# Patient Record
Sex: FEMALE | Race: OTHER | Employment: UNEMPLOYED | URBAN - METROPOLITAN AREA
[De-identification: names, ages, dates, MRNs, and addresses within clinical notes are randomized per-mention and may not be internally consistent; named-entity substitution may affect disease eponyms.]

---

## 2018-09-17 ENCOUNTER — HOSPITAL ENCOUNTER (INPATIENT)
Age: 63
LOS: 5 days | Discharge: HOME | DRG: 445 | End: 2018-09-23
Attending: EMERGENCY MEDICINE | Admitting: HOSPITALIST
Payer: MEDICARE

## 2018-09-17 DIAGNOSIS — R74.8 ELEVATED LIVER ENZYMES: ICD-10-CM

## 2018-09-17 DIAGNOSIS — E83.42 HYPOMAGNESEMIA: Primary | ICD-10-CM

## 2018-09-17 DIAGNOSIS — N30.01 ACUTE CYSTITIS WITH HEMATURIA: ICD-10-CM

## 2018-09-17 DIAGNOSIS — K80.50 CHOLEDOCHOLITHIASIS: ICD-10-CM

## 2018-09-17 DIAGNOSIS — K80.81 BILIARY CALCULUS OF OTHER SITE WITH OBSTRUCTION: ICD-10-CM

## 2018-09-17 LAB
APPEARANCE UR: CLEAR
BACTERIA URNS QL MICRO: ABNORMAL /HPF
BILIRUB UR QL: ABNORMAL
COLOR UR: ABNORMAL
EPITH CASTS URNS QL MICRO: ABNORMAL /LPF (ref 0–5)
GLUCOSE UR STRIP.AUTO-MCNC: NEGATIVE MG/DL
HGB UR QL STRIP: ABNORMAL
KETONES UR QL STRIP.AUTO: ABNORMAL MG/DL
LEUKOCYTE ESTERASE UR QL STRIP.AUTO: ABNORMAL
NITRITE UR QL STRIP.AUTO: POSITIVE
PH UR STRIP: 5 [PH] (ref 5–8)
PROT UR STRIP-MCNC: 100 MG/DL
RBC #/AREA URNS HPF: ABNORMAL /HPF (ref 0–5)
SP GR UR REFRACTOMETRY: 1.02 (ref 1–1.03)
UROBILINOGEN UR QL STRIP.AUTO: 1 EU/DL (ref 0.2–1)
WBC URNS QL MICRO: ABNORMAL /HPF (ref 0–5)

## 2018-09-17 PROCEDURE — 87186 SC STD MICRODIL/AGAR DIL: CPT | Performed by: EMERGENCY MEDICINE

## 2018-09-17 PROCEDURE — 99285 EMERGENCY DEPT VISIT HI MDM: CPT

## 2018-09-17 PROCEDURE — 81001 URINALYSIS AUTO W/SCOPE: CPT | Performed by: EMERGENCY MEDICINE

## 2018-09-17 PROCEDURE — 87086 URINE CULTURE/COLONY COUNT: CPT | Performed by: EMERGENCY MEDICINE

## 2018-09-17 PROCEDURE — 87077 CULTURE AEROBIC IDENTIFY: CPT | Performed by: EMERGENCY MEDICINE

## 2018-09-17 PROCEDURE — 93005 ELECTROCARDIOGRAM TRACING: CPT

## 2018-09-17 RX ORDER — AMLODIPINE BESYLATE 5 MG/1
5 TABLET ORAL DAILY
COMMUNITY
End: 2018-09-23

## 2018-09-17 RX ORDER — ATENOLOL 50 MG/1
50 TABLET ORAL 2 TIMES DAILY
COMMUNITY
End: 2018-09-23

## 2018-09-17 NOTE — IP AVS SNAPSHOT
[address] [address] 
[phone] Patient: Aurora Root MRN: [id number] TXO:[id number]/[id number] About your hospitalization You were admitted on:  September 18, 2018 You last received care in the:  [address] [place] [address] You were discharged on:  September 23, 2018 Why you were hospitalized Your primary diagnosis was:  Choledocholithiasis Your diagnoses also included:  Hypomagnesemia, Uti (Urinary Tract Infection), Hypertension Follow-up Information Follow up With Details Comments Contact Info Mane Galan MD On 11/27/2018 Follow up appointment scheduled for November 27, 2018 at 2:00 p.m. Please bring Montserratian Speaking Family Member with you to follow up appointment. [address] [place] [address] [address] [place] [address] [phone] Flores Law MD On 10/1/2018 Follow up appointment scheduled for October 1, 2018 at 11:30 a.m. Please arrive at 11:00 a.m. for check in. Please bring Montserratian Speaking Family Member with you to the appointment. [address] [facility] [address] [facility] 
[phone] Daina Valencia MD   [address] [facility] [address] [place], [place] [id number] 
[phone] Discharge Orders None A check mary indicates which time of day the medication should be taken. My Medications START taking these medications Instructions Each Dose to Equal  
 Morning Noon Evening Bedtime  
 ciprofloxacin HCl 500 mg tablet Commonly known as:  CIPRO Your last dose was: Your next dose is: Take 1 Tab by mouth every twelve (12) hours. 500 mg  
    
   
   
   
  
 hydrALAZINE 25 mg tablet Commonly known as:  APRESOLINE Your last dose was: Your next dose is: Take 1 Tab by mouth three (3) times daily. 25 mg  
    
   
   
   
  
 lisinopril 20 mg tablet Commonly known as:  Dayana Campbell Your last dose was: Your next dose is: Take 1 Tab by mouth daily. 20 mg CHANGE how you take these medications Instructions Each Dose to Equal  
 Morning Noon Evening Bedtime  
 amLODIPine 10 mg tablet Commonly known as:  Claude Ochoa What changed:   
- medication strength 
- how much to take Your last dose was: Your next dose is: Take 1 Tab by mouth daily. 10 mg  
    
   
   
   
  
 atenolol 100 mg tablet Commonly known as:  TENORMIN What changed:   
- medication strength 
- how much to take Your last dose was: Your next dose is: Take 1 Tab by mouth two (2) times a day. 100 mg Where to Get Your Medications Information on where to get these meds will be given to you by the nurse or doctor. ! Ask your nurse or doctor about these medications  
  amLODIPine 10 mg tablet  
 atenolol 100 mg tablet  
 ciprofloxacin HCl 500 mg tablet  
 hydrALAZINE 25 mg tablet  
 lisinopril 20 mg tablet Discharge Instructions Aprenda sobre cálculos biliares - [ Learning About Gallstones ] 

Elana Morena son los cálculos biliares? Los cálculos biliares son piedras que se carmen en la vesícula biliar. La vesícula biliar es un saco pequeño ubicado romain debajo del hígado. [place] la bilis secretada por el hígado. La bilis lo ayuda a [employer]. Los cálculos biliares pueden ser más pequeños que un grano de arena o tan grandes ana corazon pelota de golf. Los cálculos biliares se carmen cuando el colesterol y otras sustancias que se encuentran en la bilis carmen cálculos. También pueden formarse si la vesícula biliar no se vacía ana debería. Los cálculos también pueden formarse en el colédoco o en el conducto cístico. Estos tubos llevan la bilis de la vesícula biliar y el hígado al intestino pan. Qué pasa cuando tiene cálculos biliares? Los cálculos biliares pueden causar muchos problemas diferentes, ana: 
· Corazon obstrucción en el colédoco. 
· Inflamación o infección de la vesícula biliar (colecistitis aguda). [place] puede pasar cuando un cálculo biliar bloquea el conducto cístico. 
· Inflamación o infección del colédoco (colangitis). [place] puede pasar cuando los cálculos biliares se atascan en el colédoco. En raras ocasiones, esto puede dañar el hígado o diseminar corazon infección. · Pancreatitis, corazon inflamación del páncreas. Cuáles son los síntomas? · La mayoría de las personas que tienen cálculos biliares no tienen síntomas. · Los síntomas pueden incluir: ¨ Dolor leve en la boca del estómago o en la parte superior derecha del abdomen. Puede extenderse a la parte superior derecha de la espalda o al área del omóplato. ¨ Dolor intenso que puede aparecer y desaparecer o ser juma. Puede ser peor al comer. Lamar Linear y escalofríos, si un cálculo biliar está obstruyendo un conducto y está causando corazon infección. ¨ Tinte amarillo en la piel y en la parte marv de los ojos. Cómo se pueden prevenir los cálculos biliares? No hay corazon manera cierta de prevenir los cálculos biliares. Sin embargo, usted puede reducir el riesgo de que se formen cálculos biliares que puedan causar síntomas. · Mantenga un peso saludable. Si necesita bajar de peso, hágalo despacio y en forma sensata. · Coma comidas equilibradas en forma regular. · Yaquelin Bathe y james ejercicio regularmente. Cómo se tratan los cálculos biliares? · Si usted no tiene síntomas, probablemente no necesite tratamiento. · Si tiene síntomas leves, es posible que randle médico le diga que debe sobia un analgésico (medicamento para el dolor) y que espere para edison si desaparece el dolor.  
· Para earnestine intensos o infección, o si tiene más de un ataque de cálculos biliares, randle médico puede sugerirle que le extraigan la vesícula biliar. El cuerpo funciona umair sin la vesícula biliar. La atención de seguimiento es corazon parte clave de randle tratamiento y seguridad. Asegúrese de hacer y acudir a todas las citas, y llame a randle médico si está teniendo problemas. También es corazon buena idea saber los resultados de jasmyn exámenes y mantener corazon lista de los medicamentos que marc. Dónde puede encontrar más información en inglés? Justin Henry a http://reema-balta.info/. Edin Petit Z738 en la búsqueda para aprender más acerca de \"Aprenda sobre cálculos biliares - [ Learning About Gallstones ]. \" 
Revisado: 12 mayo, 2017 Versión del contenido: 11.7 © 5632-6286 Healthwise, Incorporated. Las instrucciones de cuidado fueron adaptadas bajo licencia por [employer] (which disclaims liability or warranty for this information). Si usted tiene [place] afección médica o sobre estas instrucciones, siempre pregunte a randle profesional de al. Healthwise, Incorporated niega toda garantía o responsabilidad por randle uso de esta información. Nearbox Announcement We are excited to announce that we are making your provider's discharge notes available to you in Nearbox. You will see these notes when they are completed and signed by the physician that discharged you from your recent hospital stay. If you have any questions or concerns about any information you see in Nearbox, please call the Health Information Department where you were seen or reach out to your Primary Care Provider for more information about your plan of care. Introducing hospitals & HEALTH [facility]! [facility] introduce portal paciente Nearbox . Ahora se puede acceder a partes de randle expediente médico, enviar por correo electrónico la oficina de randle médico y solicitar renovaciones de medicamentos en línea. En randle navegador de Internet , Cleo Due a [email or web address] Sal clic en el usuario por Reece Bail?  Pankaj Poncer clic aquí en la Hilaria Barbour. Verá la página de registro [place]. Ingrese randle código de [employer] alvino y ana aparece a continuación. Usted no tendrá que [employer] código después de cesar completado el proceso de registro . Si usted no se inscribe antes de la fecha de caducidad , debe solicitar un nuevo código. · MyChart Código de acceso : [id number] Expires: 12/16/2018 11:21 PM 
 
Ingresa los últimos cuatro dígitos de randle Número de Seguro Social ( xxxx ) y fecha de nacimiento ( dd / mm / aaaa ) ana se indica y sal clic en Enviar. Usted será llevado a la siguiente página de registro . Crear un ID MyChart . Esta será randle ID de inicio de sesión de MyChart y no puede ser [place] , por lo que pensar en corazon que es Sharlette Handy y fácil de recordar . Crear corazon contraseña MyChart . Usted puede cambiar randle contraseña en cualquier momento . Ingrese randle Password Reset de preguntas y [place] . [place] se puede utilizar en un momento posterior si usted olvida randle contraseña. Introduzca randle dirección de correo electrónico . Coco Estimable recibirá corazon notificación por correo electrónico cuando la nueva información está disponible en MyChart . Bernard zuniga en Registrarse. Lulú Conn edison y descargar porciones de randle expediente médico. 
Sal clic en el enlace de descarga del menú Resumen para descargar corazon copia portátil de randle información médica . Si tiene Kamran [employer] , por favor visite la sección de preguntas frecuentes del sitio web MyChart . Recuerde, MyChart NO es que se utilizará para las necesidades urgentes. Para emergencias médicas , llame al 911 . Ahora disponible en randle iPhone y Android ! Introducing Ha Forman As a [employer] patient, I wanted to make you aware of our electronic visit tool called Ha Forman. [employer] 24/7 allows you to connect within minutes with a medical provider 24 hours a day, seven days a week via a mobile device or tablet or logging into a secure website from your computer. You can access [employer] from anywhere in the [place]. A virtual visit might be right for you when you have a simple condition and feel like you just dont want to get out of bed, or cant get away from work for an appointment, when your regular [facility] provider is not available (evenings, weekends or holidays), or when youre out of town and need minor care. Electronic visits cost only $49 and if the [facility] 24/7 provider determines a prescription is needed to treat your condition, one can be electronically transmitted to a nearby pharmacy*. Please take a moment to enroll today if you have not already done so. The enrollment process is free and takes just a few minutes. To enroll, please download the [facility] 24/7 jazzmine to your tablet or phone, or visit [email or web address] to enroll on your computer. And, as an [address] [place] [address] patient with a [employer] account, the results of your visits will be scanned into your electronic medical record and your primary care provider will be able to view the scanned results. We urge you to continue to see your regular [facility] provider for your ongoing medical care. And while your primary care provider may not be the one available when you seek a Ha Reederfin virtual visit, the peace of mind you get from getting a real diagnosis real time can be priceless. For more information on Ha [employer]icaltamfin, view our Frequently Asked Questions (FAQs) at [email or web address]. Sincerely, 
 
Feliciano Hayden MD 
Chief Medical Officer Victor M Powell *:  certain medications cannot be prescribed via SoloPower Unresulted Labs-Please follow up with your PCP about these lab tests Order Current Status CULTURE, BLOOD Preliminary result CULTURE, BLOOD Preliminary result EKG, 12 LEAD, INITIAL Preliminary result Providers Seen During Your Hospitalization Provider Specialty Primary office phone Cecilia Harvey DO Emergency Medicine [phone] Enid Hand MD [facility] [phone] Immunizations Administered for This Admission Name Date Influenza Vaccine (Quad) PF 9/23/2018 Your Primary Care Physician (PCP) Primary Care Physician Office Phone Office Fax Mabel Lunsford [phone] You are allergic to the following No active allergies Recent Documentation Height Weight BMI Smoking Status 1.499 m 63.1 kg 28.1 kg/m2 Never Smoker Emergency Contacts Name Discharge Info Relation Home Work Mobile ArmandoRola DISCHARGE CAREGIVER [3] Child [2] [phone] Patient Belongings The following personal items are in your possession at time of discharge: 
  Dental Appliances: None  Visual Aid: None      Home Medications: None   Jewelry: Bracelet (with brandon)  Clothing: None    Other Valuables: None Please provide this summary of care documentation to your next provider. Signatures-by signing, you are acknowledging that this After Visit Summary has been reviewed with you and you have received a copy. Patient Signature:  ____________________________________________________________ Date:  ____________________________________________________________  
  
Aloma Snellen Provider Signature:  ____________________________________________________________ Date:  ____________________________________________________________  
  
  
   
  
[address] 
[address] [place] [address] 
[phone] Patient: Kj Courtney MRN: [id number] EYB:[id number] Sobre huertas hospitalización Le admitieron el:  September 18, 2018 Huertas tratamiento más reciente [facility]  THE [address] SURGICAL/ONCOLOGY Le dieron de edson el:  September 23, 2018 Por qué le ingresaron Huertas diagnosis primaria es:  Choledocholithiasis Huertas diagnosis también incluye:  Hypomagnesemia, Uti (Urinary Tract Infection), Hypertension Follow-up Information Follow up With Details Comments Contact Info Juan Vivar MD On 11/27/2018 Follow up appointment scheduled for November 27, 2018 at 2:00 p.m. Please bring Niuean Speaking Family Member with you to follow up appointment. [address] [facility] [address] [place] [id number] 
[phone] Shauna Serrato MD On 10/1/2018 Follow up appointment scheduled for October 1, 2018 at 11:30 a.m. Please arrive at 11:00 a.m. for check in. Please bring Niuean Speaking Family Member with you to the appointment. [address] [facility] [address] [facility] 
[phone] Arabella Miguel MD   [address] [facility] [address] [place] [address] 
[phone] Discharge Orders Cash'o & Butcher A check mary indicates which time of day the medication should be taken. My Medications EMPIECE a "[employer] Instructions Each Dose to Equal  
 Morning Noon Evening Bedtime  
 ciprofloxacin HCl 500 mg tablet También conocido ana:  CIPRO Your last dose was: Your next dose is: Take 1 Tab by mouth every twelve (12) hours. 500 mg  
    
   
   
   
  
 hydrALAZINE 25 mg tablet También conocido ana:  APRESOLINE Your last dose was: Your next dose is: Take 1 Tab by mouth three (3) times daily. 25 mg  
    
   
   
   
  
 lisinopril 20 mg tablet También conocido ana:  Harlingen Escort Your last dose was: Your next dose is: Take 1 Tab by mouth daily. 20 mg  
    
   
   
   
  
  
CAMBIE la forma de "[employer] Instructions Each Dose to Equal  
 Morning Noon Evening Bedtime  
 amLODIPine 10 mg tablet También conocido ana:  Casper Cater Lo que cambió:   
- concentración del medicamento 
- dosis a sobia Your last dose was: Your next dose is: Take 1 Tab by mouth daily. 10 mg  
    
   
   
   
  
 atenolol 100 mg tablet También conocido ana:  TENORMIN Lo que cambió:   
- concentración del medicamento 
- dosis a sobia Your last dose was: Your next dose is: Take 1 Tab by mouth two (2) times a day. 100 mg  
    
   
   
   
  
  
  
Dónde puede recoger jasmyn medicamentos Information on where to get these meds will be given to you by the nurse or doctor. ! Pregunte a randle médico o enfermero/a sobre estos medicamentos  
  amLODIPine 10 mg tablet  
 atenolol 100 mg tablet  
 ciprofloxacin HCl 500 mg tablet  
 hydrALAZINE 25 mg tablet  
 lisinopril 20 mg tablet Instrucciones a ed de edson Aprenda sobre cálculos biliares - [ Learning About Gallstones ] 

Gurvinder Barakat son los cálculos biliares? Los cálculos biliares son piedras que se carmen en la vesícula biliar. La vesícula biliar es un saco pequeño ubicado romain debajo del hígado. [place] la bilis secretada por el hígado. La bilis lo ayuda a [employer]. Los cálculos biliares pueden ser más pequeños que un grano de arena o tan grandes ana corazon pelota de golf. Los cálculos biliares se carmen cuando el colesterol y otras sustancias que se encuentran en la bilis carmen cálculos. También pueden formarse si la vesícula biliar no se vacía ana debería. Los cálculos también pueden formarse en el colédoco o en el conducto cístico. Estos tubos llevan la bilis de la vesícula biliar y el hígado al intestino pan. Qué pasa cuando tiene cálculos biliares? Los cálculos biliares pueden causar muchos problemas diferentes, ana: 
· Corazon obstrucción en el colédoco. 
· Inflamación o infección de la vesícula biliar (colecistitis aguda).  [place] puede pasar cuando un cálculo biliar bloquea el conducto cístico. 
· Inflamación o infección del colédoco (colangitis). [place] puede pasar cuando los cálculos biliares se atascan en el colédoco. En raras ocasiones, esto puede dañar el hígado o diseminar corazon infección. · Pancreatitis, corazon inflamación del páncreas. Cuáles son los síntomas? · La mayoría de las personas que tienen cálculos biliares no tienen síntomas. · Los síntomas pueden incluir: ¨ Dolor leve en la boca del estómago o en la parte superior derecha del abdomen. Puede extenderse a la parte superior derecha de la espalda o al área del omóplato. ¨ Dolor intenso que puede aparecer y desaparecer o ser juma. Puede ser peor al comer. Malva File y escalofríos, si un cálculo biliar está obstruyendo un conducto y está causando corazon infección. ¨ Tinte amarillo en la piel y en la parte marv de los ojos. Cómo se pueden prevenir los cálculos biliares? No hay corazon manera cierta de prevenir los cálculos biliares. Sin embargo, usted puede reducir el riesgo de que se formen cálculos biliares que puedan causar síntomas. · Mantenga un peso saludable. Si necesita bajar de peso, hágalo despacio y en forma sensata. · Coma comidas equilibradas en forma regular. · Lizandro Men y james ejercicio regularmente. Cómo se tratan los cálculos biliares? · Si usted no tiene síntomas, probablemente no necesite tratamiento. · Si tiene síntomas leves, es posible que randle médico le diga que debe sobia un analgésico (medicamento para el dolor) y que espere para edison si desaparece el dolor. · Para earnestine intensos o infección, o si tiene más de un ataque de cálculos biliares, randle médico puede sugerirle que le extraigan la vesícula biliar. El cuerpo funciona umair sin la vesícula biliar. La atención de seguimiento es corazon parte clave de randle tratamiento y seguridad.  Asegúrese de hacer y acudir a todas las citas, y llame a randle médico si está teniendo problemas. También es corazon buena idea saber los resultados de jasmyn exámenes y mantener corazon lista de los medicamentos que marc. Dónde puede encontrar más información en inglés? Vince Montenegro a http://reema-balta.info/. Glorya Sacks J483 en la búsqueda para aprender más acerca de \"Aprenda sobre cálculos biliares - [ Learning About Gallstones ]. \" 
Revisado: 12 [place], 2017 Versión del contenido: 11.7 © 6739-9674 Healthwise, Incorporated. Las instrucciones de cuidado fueron adaptadas bajo licencia por Good [employer] Conne[employer] (which disclaims liability or warranty for this information). Si usted tiene [place] afección médica o sobre estas instrucciones, siempre pregunte a randle profesional de al. Healthwise, Incorporated niega toda garantía o responsabilidad por randle uso de esta información. [employer] Announcement We are excited to announce that we are making your provider's discharge notes available to you in [employer]. You will see these notes when they are completed and signed by the physician that discharged you from your recent hospital stay. If you have any questions or concerns about any information you see in [employer], please call the Health Information Department where you were seen or reach out to your Primary Care Provider for more information about your plan of care. Introducing [facility] & HEALTH SERVICES! [facility] introduce portal paciente [employer] . Ahora se puede acceder a partes de randle expediente médico, enviar por correo electrónico la oficina de randle médico y solicitar renovaciones de medicamentos en línea. En randle navegador de Internet , Mag mcgee [email or web address] Sal clic en el usuario por On license of [facility]? Alpa Cui clic aquí en la sesión Allen Roche. Verá la página de registro [place]. Ingrese randle código de [facility] alvino y ana aparece a continuación.  Usted no tendrá que [employer] código después de cesar completado el proceso de registro . Si usted no se inscribe antes de la fecha de caducidad , debe solicitar un nuevo código. · MyChart Código de acceso : [id number] Expires: 12/16/2018 11:21 PM 
 
Ingresa los últimos cuatro dígitos de randle Número de Seguro Social ( xxxx ) y fecha de nacimiento ( dd / mm / aaaa ) ana se indica y sal clic en Enviar. Usted será llevado a la siguiente página de registro . Crear un ID MyChart . Esta será randle ID de inicio de sesión de MyChart y no puede ser [place] , por lo que pensar en corazon que es Gray Saver y fácil de recordar . Crear corazon contraseña MyChart . Usted puede cambiar randle contraseña en cualquier momento . Ingrese randle Password Reset de preguntas y [place] . [place] se puede utilizar en un momento posterior si usted olvida randle contraseña. Introduzca randle dirección de correo electrónico . Kaylee Wei recibirá corazon notificación por correo electrónico cuando la nueva información está disponible en MyChart . Basil zuniga en Registrarse. Yesi Moralezter edison y descargar porciones de randle expediente médico. 
Sal nadia en el enlace de descarga del menú Resumen para descargar corazon copia portátil de randle información médica . Si tiene Kamran [employer] , por favor visite la sección de preguntas frecuentes del sitio web MyChart . Recuerde, MyChart NO es que se utilizará para las necesidades urgentes. Para emergencias médicas , llame al 911 . Ahora disponible en randle iPhone y Android ! Introducing Ha Forman As a [employer] patient, I wanted to make you aware of our electronic visit tool called Ha Cotatamlorena. [employer] 24/7 allows you to connect within minutes with a medical provider 24 hours a day, seven days a week via a mobile device or tablet or logging into a secure website from your computer. You can access Ha Forman from anywhere in the [place].  
 
A virtual visit might be right for you when you have a simple condition and feel like you just dont want to get out of bed, or cant get away from work for an appointment, when your regular [employer] provider is not available (evenings, weekends or holidays), or when youre out of town and need minor care. Electronic visits cost only $49 and if the [employer] 24/7 provider determines a prescription is needed to treat your condition, one can be electronically transmitted to a nearby pharmacy*. Please take a moment to enroll today if you have not already done so. The enrollment process is free and takes just a few minutes. To enroll, please download the [employer] 24/7 jazzmine to your tablet or phone, or visit [email or web address] to enroll on your computer. And, as an [address] [place] [address] patient with a [employer] account, the results of your visits will be scanned into your electronic medical record and your primary care provider will be able to view the scanned results. We urge you to continue to see your regular [employer] provider for your ongoing medical care. And while your primary care provider may not be the one available when you seek a xzoops virtual visit, the peace of mind you get from getting a real diagnosis real time can be priceless. For more information on xzoops, view our Frequently Asked Questions (FAQs) at [email or web address] org. Sincerely, 
 
Sarahi Huston MD 
Chief Medical Officer Victor M Powell *:  certain medications cannot be prescribed via xzoops Unresulted Labs-Please follow up with your PCP about these lab tests Order Current Status CULTURE, BLOOD Preliminary result CULTURE, BLOOD Preliminary result EKG, 12 LEAD, INITIAL Preliminary result Providers Seen During Your Hospitalization Personal [facility] Teléfono principal de la oficina Edgar Minium, DO Emergency Medicine [phone] Stephanie Ford MD [facility] [phone] Alma Delia Ramey Administradas en esta admisión:    
   
 Kirsten Flores Influenza Vaccine (Quad) PF 9/23/2018 [facility] prim[facility] (PCP ) Primary Care Physician Office Phone Office Fax Suresh Cueva [phone] Tiene alergias a lo siguiente No tiene alergias Documentación recientes Height Weight BMI (IMC) Estatus de tabaquísmo 1.499 m 63.1 kg 28.1 kg/m2 Never Smoker Emergency Contacts Name Discharge Info Relation Home Work Mobile RobertsRola DISCHARGE CAREGIVER [3] Child [2] [phone] Patient Belongings The following personal items are in your possession at time of discharge: 
  Dental Appliances: None  Visual Aid: None      Home Medications: None   Jewelry: Bracelet (with brandon)  Clothing: None    Other Valuables: None Please provide this summary of care documentation to your next provider. Signatures-by signing, you are acknowledging that this After Visit Summary has been reviewed with you and you have received a copy. Patient Signature:  ____________________________________________________________ Date:  ____________________________________________________________  
  
Sunita Engle Provider Signature:  ____________________________________________________________ Date:  ____________________________________________________________

## 2018-09-17 NOTE — IP AVS SNAPSHOT
[address] [address] 
[phone] Patient: Rafia Schreiber MRN: [id number] NVN:[id number] A check mary indicates which time of day the medication should be taken. My Medications START taking these medications Instructions Each Dose to Equal  
 Morning Noon Evening Bedtime  
 ciprofloxacin HCl 500 mg tablet Commonly known as:  CIPRO Your last dose was: Your next dose is: Take 1 Tab by mouth every twelve (12) hours. 500 mg  
    
   
   
   
  
 hydrALAZINE 25 mg tablet Commonly known as:  APRESOLINE Your last dose was: Your next dose is: Take 1 Tab by mouth three (3) times daily. 25 mg  
    
   
   
   
  
 lisinopril 20 mg tablet Commonly known as:  Elana Loss Your last dose was: Your next dose is: Take 1 Tab by mouth daily. 20 mg CHANGE how you take these medications Instructions Each Dose to Equal  
 Morning Noon Evening Bedtime  
 amLODIPine 10 mg tablet Commonly known as:  Don Stevens What changed:   
- medication strength 
- how much to take Your last dose was: Your next dose is: Take 1 Tab by mouth daily. 10 mg  
    
   
   
   
  
 atenolol 100 mg tablet Commonly known as:  TENORMIN What changed:   
- medication strength 
- how much to take Your last dose was: Your next dose is: Take 1 Tab by mouth two (2) times a day. 100 mg Where to Get Your Medications Information on where to get these meds will be given to you by the nurse or doctor. ! Ask your nurse or doctor about these medications  
  amLODIPine 10 mg tablet  
 atenolol 100 mg tablet  
 ciprofloxacin HCl 500 mg tablet  
 hydrALAZINE 25 mg tablet  
 lisinopril 20 mg tablet

## 2018-09-18 ENCOUNTER — APPOINTMENT (OUTPATIENT)
Dept: ULTRASOUND IMAGING | Age: 63
DRG: 445 | End: 2018-09-18
Attending: EMERGENCY MEDICINE
Payer: MEDICARE

## 2018-09-18 ENCOUNTER — ANESTHESIA EVENT (OUTPATIENT)
Dept: ENDOSCOPY | Age: 63
DRG: 445 | End: 2018-09-18
Payer: MEDICARE

## 2018-09-18 PROBLEM — N39.0 UTI (URINARY TRACT INFECTION): Status: ACTIVE | Noted: 2018-09-18

## 2018-09-18 PROBLEM — K80.50 CHOLEDOCHOLITHIASIS: Status: ACTIVE | Noted: 2018-09-18

## 2018-09-18 PROBLEM — E83.42 HYPOMAGNESEMIA: Status: ACTIVE | Noted: 2018-09-18

## 2018-09-18 LAB
ALBUMIN SERPL-MCNC: 4 G/DL (ref 3.4–5)
ALBUMIN/GLOB SERPL: 0.9 {RATIO} (ref 0.8–1.7)
ALP SERPL-CCNC: 271 U/L (ref 45–117)
ALT SERPL-CCNC: 406 U/L (ref 13–56)
ANION GAP SERPL CALC-SCNC: 16 MMOL/L (ref 3–18)
AST SERPL-CCNC: 259 U/L (ref 15–37)
BASOPHILS # BLD: 0 K/UL (ref 0–0.1)
BASOPHILS NFR BLD: 0 % (ref 0–2)
BILIRUB SERPL-MCNC: 5.9 MG/DL (ref 0.2–1)
BUN SERPL-MCNC: 20 MG/DL (ref 7–18)
BUN/CREAT SERPL: 17 (ref 12–20)
CALCIUM SERPL-MCNC: 9.5 MG/DL (ref 8.5–10.1)
CHLORIDE SERPL-SCNC: 100 MMOL/L (ref 100–108)
CO2 SERPL-SCNC: 24 MMOL/L (ref 21–32)
CREAT SERPL-MCNC: 1.2 MG/DL (ref 0.6–1.3)
DIFFERENTIAL METHOD BLD: ABNORMAL
EOSINOPHIL # BLD: 0 K/UL (ref 0–0.4)
EOSINOPHIL NFR BLD: 0 % (ref 0–5)
ERYTHROCYTE [DISTWIDTH] IN BLOOD BY AUTOMATED COUNT: 14.1 % (ref 11.6–14.5)
GLOBULIN SER CALC-MCNC: 4.5 G/DL (ref 2–4)
GLUCOSE BLD STRIP.AUTO-MCNC: 105 MG/DL (ref 70–110)
GLUCOSE SERPL-MCNC: 203 MG/DL (ref 74–99)
HCT VFR BLD AUTO: 38.7 % (ref 35–45)
HGB BLD-MCNC: 13.3 G/DL (ref 12–16)
LACTATE SERPL-SCNC: 2 MMOL/L (ref 0.4–2)
LIPASE SERPL-CCNC: 163 U/L (ref 73–393)
LYMPHOCYTES # BLD: 0.5 K/UL (ref 0.9–3.6)
LYMPHOCYTES NFR BLD: 3 % (ref 21–52)
MAGNESIUM SERPL-MCNC: 1.3 MG/DL (ref 1.6–2.6)
MAGNESIUM SERPL-MCNC: 3.1 MG/DL (ref 1.6–2.6)
MCH RBC QN AUTO: 26.7 PG (ref 24–34)
MCHC RBC AUTO-ENTMCNC: 34.4 G/DL (ref 31–37)
MCV RBC AUTO: 77.6 FL (ref 74–97)
MONOCYTES # BLD: 0.3 K/UL (ref 0.05–1.2)
MONOCYTES NFR BLD: 2 % (ref 3–10)
NEUTS SEG # BLD: 16.6 K/UL (ref 1.8–8)
NEUTS SEG NFR BLD: 95 % (ref 40–73)
PLATELET # BLD AUTO: 323 K/UL (ref 135–420)
PMV BLD AUTO: 9.6 FL (ref 9.2–11.8)
POTASSIUM SERPL-SCNC: 3.4 MMOL/L (ref 3.5–5.5)
PROT SERPL-MCNC: 8.5 G/DL (ref 6.4–8.2)
RBC # BLD AUTO: 4.99 M/UL (ref 4.2–5.3)
SODIUM SERPL-SCNC: 140 MMOL/L (ref 136–145)
WBC # BLD AUTO: 17.4 K/UL (ref 4.6–13.2)

## 2018-09-18 PROCEDURE — 74011000250 HC RX REV CODE- 250: Performed by: HOSPITALIST

## 2018-09-18 PROCEDURE — 76705 ECHO EXAM OF ABDOMEN: CPT

## 2018-09-18 PROCEDURE — 96375 TX/PRO/DX INJ NEW DRUG ADDON: CPT

## 2018-09-18 PROCEDURE — 80053 COMPREHEN METABOLIC PANEL: CPT | Performed by: EMERGENCY MEDICINE

## 2018-09-18 PROCEDURE — 87077 CULTURE AEROBIC IDENTIFY: CPT | Performed by: EMERGENCY MEDICINE

## 2018-09-18 PROCEDURE — 96361 HYDRATE IV INFUSION ADD-ON: CPT

## 2018-09-18 PROCEDURE — 74011250636 HC RX REV CODE- 250/636: Performed by: INTERNAL MEDICINE

## 2018-09-18 PROCEDURE — 87040 BLOOD CULTURE FOR BACTERIA: CPT | Performed by: EMERGENCY MEDICINE

## 2018-09-18 PROCEDURE — 74011250636 HC RX REV CODE- 250/636: Performed by: EMERGENCY MEDICINE

## 2018-09-18 PROCEDURE — 85025 COMPLETE CBC W/AUTO DIFF WBC: CPT | Performed by: EMERGENCY MEDICINE

## 2018-09-18 PROCEDURE — 83735 ASSAY OF MAGNESIUM: CPT | Performed by: HOSPITALIST

## 2018-09-18 PROCEDURE — 83690 ASSAY OF LIPASE: CPT | Performed by: EMERGENCY MEDICINE

## 2018-09-18 PROCEDURE — 74011250636 HC RX REV CODE- 250/636: Performed by: HOSPITALIST

## 2018-09-18 PROCEDURE — 83735 ASSAY OF MAGNESIUM: CPT | Performed by: EMERGENCY MEDICINE

## 2018-09-18 PROCEDURE — 65270000029 HC RM PRIVATE

## 2018-09-18 PROCEDURE — 96365 THER/PROPH/DIAG IV INF INIT: CPT

## 2018-09-18 PROCEDURE — 82962 GLUCOSE BLOOD TEST: CPT

## 2018-09-18 PROCEDURE — 87186 SC STD MICRODIL/AGAR DIL: CPT | Performed by: EMERGENCY MEDICINE

## 2018-09-18 PROCEDURE — 74011250637 HC RX REV CODE- 250/637: Performed by: HOSPITALIST

## 2018-09-18 PROCEDURE — 74011000250 HC RX REV CODE- 250: Performed by: EMERGENCY MEDICINE

## 2018-09-18 PROCEDURE — 74011000250 HC RX REV CODE- 250

## 2018-09-18 PROCEDURE — 36415 COLL VENOUS BLD VENIPUNCTURE: CPT | Performed by: HOSPITALIST

## 2018-09-18 PROCEDURE — C9113 INJ PANTOPRAZOLE SODIUM, VIA: HCPCS | Performed by: HOSPITALIST

## 2018-09-18 PROCEDURE — 83605 ASSAY OF LACTIC ACID: CPT | Performed by: EMERGENCY MEDICINE

## 2018-09-18 RX ORDER — DEXTROMETHORPHAN/PSEUDOEPHED 2.5-7.5/.8
1.2 DROPS ORAL
Status: CANCELLED | OUTPATIENT
Start: 2018-09-18

## 2018-09-18 RX ORDER — EPINEPHRINE 0.1 MG/ML
1 INJECTION INTRACARDIAC; INTRAVENOUS
Status: CANCELLED | OUTPATIENT
Start: 2018-09-18 | End: 2018-09-19

## 2018-09-18 RX ORDER — ATENOLOL 50 MG/1
50 TABLET ORAL 2 TIMES DAILY
Status: DISCONTINUED | OUTPATIENT
Start: 2018-09-18 | End: 2018-09-21

## 2018-09-18 RX ORDER — ATROPINE SULFATE 0.1 MG/ML
0.5 INJECTION INTRAVENOUS
Status: CANCELLED | OUTPATIENT
Start: 2018-09-18 | End: 2018-09-19

## 2018-09-18 RX ORDER — WATER FOR INJECTION,STERILE
VIAL (ML) INJECTION
Status: COMPLETED
Start: 2018-09-18 | End: 2018-09-18

## 2018-09-18 RX ORDER — ONDANSETRON 2 MG/ML
4 INJECTION INTRAMUSCULAR; INTRAVENOUS
Status: COMPLETED | OUTPATIENT
Start: 2018-09-18 | End: 2018-09-18

## 2018-09-18 RX ORDER — MORPHINE SULFATE 2 MG/ML
2 INJECTION, SOLUTION INTRAMUSCULAR; INTRAVENOUS
Status: DISCONTINUED | OUTPATIENT
Start: 2018-09-18 | End: 2018-09-23 | Stop reason: HOSPADM

## 2018-09-18 RX ORDER — MAGNESIUM SULFATE HEPTAHYDRATE 40 MG/ML
2 INJECTION, SOLUTION INTRAVENOUS ONCE
Status: COMPLETED | OUTPATIENT
Start: 2018-09-18 | End: 2018-09-18

## 2018-09-18 RX ORDER — NALOXONE HYDROCHLORIDE 0.4 MG/ML
0.4 INJECTION, SOLUTION INTRAMUSCULAR; INTRAVENOUS; SUBCUTANEOUS
Status: CANCELLED | OUTPATIENT
Start: 2018-09-18 | End: 2018-09-18

## 2018-09-18 RX ORDER — SODIUM CHLORIDE 0.9 % (FLUSH) 0.9 %
5-10 SYRINGE (ML) INJECTION AS NEEDED
Status: DISCONTINUED | OUTPATIENT
Start: 2018-09-18 | End: 2018-09-23 | Stop reason: HOSPADM

## 2018-09-18 RX ORDER — KETOROLAC TROMETHAMINE 15 MG/ML
15 INJECTION, SOLUTION INTRAMUSCULAR; INTRAVENOUS
Status: COMPLETED | OUTPATIENT
Start: 2018-09-18 | End: 2018-09-18

## 2018-09-18 RX ORDER — SODIUM CHLORIDE 9 MG/ML
100 INJECTION, SOLUTION INTRAVENOUS CONTINUOUS
Status: CANCELLED | OUTPATIENT
Start: 2018-09-18 | End: 2018-09-18

## 2018-09-18 RX ORDER — AMLODIPINE BESYLATE 5 MG/1
5 TABLET ORAL DAILY
Status: DISCONTINUED | OUTPATIENT
Start: 2018-09-18 | End: 2018-09-20

## 2018-09-18 RX ORDER — SODIUM CHLORIDE 9 MG/ML
75 INJECTION, SOLUTION INTRAVENOUS CONTINUOUS
Status: DISCONTINUED | OUTPATIENT
Start: 2018-09-18 | End: 2018-09-22

## 2018-09-18 RX ORDER — SODIUM CHLORIDE 0.9 % (FLUSH) 0.9 %
5-10 SYRINGE (ML) INJECTION EVERY 8 HOURS
Status: DISCONTINUED | OUTPATIENT
Start: 2018-09-18 | End: 2018-09-23 | Stop reason: HOSPADM

## 2018-09-18 RX ORDER — MIDAZOLAM HYDROCHLORIDE 1 MG/ML
.5-5 INJECTION, SOLUTION INTRAMUSCULAR; INTRAVENOUS
Status: CANCELLED | OUTPATIENT
Start: 2018-09-18 | End: 2018-09-18

## 2018-09-18 RX ORDER — MAGNESIUM SULFATE 1 G/100ML
1 INJECTION INTRAVENOUS
Status: COMPLETED | OUTPATIENT
Start: 2018-09-18 | End: 2018-09-18

## 2018-09-18 RX ORDER — FLUMAZENIL 0.1 MG/ML
0.2 INJECTION INTRAVENOUS
Status: CANCELLED | OUTPATIENT
Start: 2018-09-18 | End: 2018-09-18

## 2018-09-18 RX ORDER — HEPARIN SODIUM 5000 [USP'U]/ML
5000 INJECTION, SOLUTION INTRAVENOUS; SUBCUTANEOUS EVERY 8 HOURS
Status: DISCONTINUED | OUTPATIENT
Start: 2018-09-18 | End: 2018-09-23 | Stop reason: HOSPADM

## 2018-09-18 RX ORDER — FENTANYL CITRATE 50 UG/ML
100 INJECTION, SOLUTION INTRAMUSCULAR; INTRAVENOUS
Status: CANCELLED | OUTPATIENT
Start: 2018-09-18 | End: 2018-09-18

## 2018-09-18 RX ORDER — CIPROFLOXACIN 2 MG/ML
400 INJECTION, SOLUTION INTRAVENOUS EVERY 12 HOURS
Status: DISCONTINUED | OUTPATIENT
Start: 2018-09-18 | End: 2018-09-21

## 2018-09-18 RX ADMIN — SODIUM CHLORIDE 125 ML/HR: 900 INJECTION, SOLUTION INTRAVENOUS at 21:18

## 2018-09-18 RX ADMIN — WATER: 1 INJECTION INTRAMUSCULAR; INTRAVENOUS; SUBCUTANEOUS at 12:40

## 2018-09-18 RX ADMIN — KETOROLAC TROMETHAMINE 15 MG: 15 INJECTION, SOLUTION INTRAMUSCULAR; INTRAVENOUS at 00:19

## 2018-09-18 RX ADMIN — MAGNESIUM SULFATE HEPTAHYDRATE 1 G: 1 INJECTION, SOLUTION INTRAVENOUS at 01:28

## 2018-09-18 RX ADMIN — CIPROFLOXACIN 400 MG: 2 INJECTION, SOLUTION INTRAVENOUS at 17:09

## 2018-09-18 RX ADMIN — HEPARIN SODIUM 5000 UNITS: 5000 INJECTION INTRAVENOUS; SUBCUTANEOUS at 08:00

## 2018-09-18 RX ADMIN — ONDANSETRON 4 MG: 2 INJECTION INTRAMUSCULAR; INTRAVENOUS at 00:18

## 2018-09-18 RX ADMIN — SODIUM CHLORIDE 125 ML/HR: 900 INJECTION, SOLUTION INTRAVENOUS at 07:10

## 2018-09-18 RX ADMIN — HEPARIN SODIUM 5000 UNITS: 5000 INJECTION INTRAVENOUS; SUBCUTANEOUS at 17:09

## 2018-09-18 RX ADMIN — SODIUM CHLORIDE 40 MG: 9 INJECTION INTRAMUSCULAR; INTRAVENOUS; SUBCUTANEOUS at 09:11

## 2018-09-18 RX ADMIN — ATENOLOL 50 MG: 50 TABLET ORAL at 20:32

## 2018-09-18 RX ADMIN — MAGNESIUM SULFATE HEPTAHYDRATE 2 G: 40 INJECTION, SOLUTION INTRAVENOUS at 07:10

## 2018-09-18 RX ADMIN — Medication 10 ML: at 07:11

## 2018-09-18 RX ADMIN — CEFTRIAXONE 1 G: 1 INJECTION, POWDER, FOR SOLUTION INTRAMUSCULAR; INTRAVENOUS at 00:21

## 2018-09-18 RX ADMIN — SODIUM CHLORIDE 1000 ML: 900 INJECTION, SOLUTION INTRAVENOUS at 00:16

## 2018-09-18 RX ADMIN — WATER 2 G: 1 INJECTION INTRAMUSCULAR; INTRAVENOUS; SUBCUTANEOUS at 12:39

## 2018-09-18 NOTE — ED PROVIDER NOTES
EMERGENCY DEPARTMENT HISTORY AND PHYSICAL EXAM 
 
Date: 9/17/2018 Patient Name: Zenobia Rojo History of Presenting Illness Chief Complaint Patient presents with  Vomiting  Abdominal Pain  Back Pain History Provided By: Patient and Patient's Daughter Chief Complaint: Abdominal Pain Duration: 1 Days Timing:  Acute Location: Central 
Quality: Cramping Severity: 5 out of 10 Modifying Factors: No modifying factors Associated Symptoms: vomiting, back pain Additional History (Context):  
11:48 PM 
Zenobia Rojo is a 70 y.o. female with PMHX of HTN, who presents to the emergency department C/O abdominal pain onset 1 day ago. Associated sxs include vomiting, back pain. Pt states that her abdominal pain worsens when vomiting. Pt denies dysuria, diarrhea, hematuria, hx of kidney stones, CP, SOB, tobacco use, EtOH use, illicit drug use, and any other sxs or complaints. PCP: None Current Outpatient Prescriptions Medication Sig Dispense Refill  atenolol (TENORMIN) 50 mg tablet Take 50 mg by mouth two (2) times a day.  amLODIPine (NORVASC) 5 mg tablet Take 5 mg by mouth daily. Past History Past Medical History: 
Past Medical History:  
Diagnosis Date  Hypertension Past Surgical History: 
History reviewed. No pertinent surgical history. Family History: 
History reviewed. No pertinent family history. Social History: 
Social History Substance Use Topics  Smoking status: Never Smoker  Smokeless tobacco: Never Used  Alcohol use No  
 
 
Allergies: 
No Known Allergies Review of Systems Review of Systems Constitutional: Negative for chills. Respiratory: Negative for shortness of breath. Cardiovascular: Negative for chest pain. Gastrointestinal: Positive for abdominal pain and vomiting. Negative for diarrhea. Genitourinary: Negative for dysuria and hematuria. Musculoskeletal: Positive for back pain. All other systems reviewed and are negative. Physical Exam  
 
Vitals:  
 09/18/18 0100 09/18/18 0128 09/18/18 0205 09/18/18 0230 BP: 175/65 160/77 109/76 141/58 Pulse:  85  88 Resp:   18 18 Temp:      
SpO2: 97%  98% 98% Weight:      
Height:      
 
Physical Exam 
Constitutional: Middle aged  female, no acute distress Head: Normocephalic, Atraumatic Eyes: Pupils are equal, round, and reactive to light, EOMI, no scleral icterus, no conjunctival pallor ENT: moist mucous membranes, hearing intact Neck: Supple, non-tender Cardiovascular: Regular rate and rhythm, no murmurs, rubs, or gallops Chest: Normal work of breathing and chest excursion bilaterally Lungs: Clear to ausculation bilaterally, no wheezes, no rhonchi Abdomen: Soft, non tender, non distended, normoactive bowel sounds Back: No evidence of trauma or deformity Extremities: No evidence of trauma or deformity, no LE edema Skin: Warm and dry, normal cap refill Neuro: Alert and appropriate, CN intact, normal speech, moving all 4 extremities freely and symmetrically Psychiatric: Cooperative, appropriate mood Diagnostic Study Results Labs - Recent Results (from the past 12 hour(s)) URINALYSIS W/ RFLX MICROSCOPIC Collection Time: 09/17/18 11:15 PM  
Result Value Ref Range Color DARK YELLOW Appearance CLEAR Specific gravity 1.025 1.005 - 1.030    
 pH (UA) 5.0 5.0 - 8.0 Protein 100 (A) NEG mg/dL Glucose NEGATIVE  NEG mg/dL Ketone TRACE (A) NEG mg/dL Bilirubin LARGE (A) NEG Blood LARGE (A) NEG Urobilinogen 1.0 0.2 - 1.0 EU/dL Nitrites POSITIVE (A) NEG Leukocyte Esterase SMALL (A) NEG URINE MICROSCOPIC ONLY Collection Time: 09/17/18 11:15 PM  
Result Value Ref Range WBC 1 to 3 0 - 5 /hpf  
 RBC 10 to 15 0 - 5 /hpf Epithelial cells 1+ 0 - 5 /lpf Bacteria 3+ (A) NEG /hpf  
EKG, 12 LEAD, INITIAL  Collection Time: 09/17/18 11:32 PM  
 Result Value Ref Range Ventricular Rate 85 BPM  
 Atrial Rate 85 BPM  
 P-R Interval 140 ms QRS Duration 90 ms Q-T Interval 370 ms QTC Calculation (Bezet) 440 ms Calculated P Axis 68 degrees Calculated R Axis 69 degrees Calculated T Axis 42 degrees Diagnosis Normal sinus rhythm Nonspecific ST abnormality Abnormal ECG No previous ECGs available CBC WITH AUTOMATED DIFF Collection Time: 09/18/18 12:00 AM  
Result Value Ref Range WBC 17.4 (H) 4.6 - 13.2 K/uL  
 RBC 4.99 4.20 - 5.30 M/uL  
 HGB 13.3 12.0 - 16.0 g/dL HCT 38.7 35.0 - 45.0 % MCV 77.6 74.0 - 97.0 FL  
 MCH 26.7 24.0 - 34.0 PG  
 MCHC 34.4 31.0 - 37.0 g/dL  
 RDW 14.1 11.6 - 14.5 % PLATELET 241 657 - 758 K/uL MPV 9.6 9.2 - 11.8 FL  
 NEUTROPHILS 95 (H) 40 - 73 % LYMPHOCYTES 3 (L) 21 - 52 % MONOCYTES 2 (L) 3 - 10 % EOSINOPHILS 0 0 - 5 % BASOPHILS 0 0 - 2 %  
 ABS. NEUTROPHILS 16.6 (H) 1.8 - 8.0 K/UL  
 ABS. LYMPHOCYTES 0.5 (L) 0.9 - 3.6 K/UL  
 ABS. MONOCYTES 0.3 0.05 - 1.2 K/UL  
 ABS. EOSINOPHILS 0.0 0.0 - 0.4 K/UL  
 ABS. BASOPHILS 0.0 0.0 - 0.1 K/UL  
 DF AUTOMATED METABOLIC PANEL, COMPREHENSIVE Collection Time: 09/18/18 12:00 AM  
Result Value Ref Range Sodium 140 136 - 145 mmol/L Potassium 3.4 (L) 3.5 - 5.5 mmol/L Chloride 100 100 - 108 mmol/L  
 CO2 24 21 - 32 mmol/L Anion gap 16 3.0 - 18 mmol/L Glucose 203 (H) 74 - 99 mg/dL BUN 20 (H) 7.0 - 18 MG/DL Creatinine 1.20 0.6 - 1.3 MG/DL  
 BUN/Creatinine ratio 17 12 - 20 GFR est AA 55 (L) >60 ml/min/1.73m2 GFR est non-AA 45 (L) >60 ml/min/1.73m2 Calcium 9.5 8.5 - 10.1 MG/DL Bilirubin, total 5.9 (H) 0.2 - 1.0 MG/DL  
 ALT (SGPT) 406 (H) 13 - 56 U/L  
 AST (SGOT) 259 (H) 15 - 37 U/L Alk. phosphatase 271 (H) 45 - 117 U/L Protein, total 8.5 (H) 6.4 - 8.2 g/dL Albumin 4.0 3.4 - 5.0 g/dL Globulin 4.5 (H) 2.0 - 4.0 g/dL A-G Ratio 0.9 0.8 - 1.7 MAGNESIUM  
 Collection Time: 09/18/18 12:00 AM  
Result Value Ref Range Magnesium 1.3 (L) 1.6 - 2.6 mg/dL LIPASE Collection Time: 09/18/18 12:00 AM  
Result Value Ref Range Lipase 163 73 - 393 U/L  
LACTIC ACID Collection Time: 09/18/18 12:00 AM  
Result Value Ref Range Lactic acid 2.0 0.4 - 2.0 MMOL/L Radiologic Studies -  
US ABD LTD Final Result IMPRESSION: 
 
Cholelithiasis with mild gallbladder wall thickening. Dilated common bile duct up to 12 mm and dilated intrahepatic biliary ducts. Consider choledocholithiasis. US ABD LTD    (Results Pending) CT Results  (Last 48 hours) None CXR Results  (Last 48 hours) None Medications given in the ED- Medications  
sodium chloride 0.9 % bolus infusion 1,000 mL (0 mL IntraVENous IV Completed 9/18/18 0131) cefTRIAXone (ROCEPHIN) 1 g in sterile water (preservative free) 10 mL IV syringe (1 g IntraVENous Given 9/18/18 0021)  
ketorolac (TORADOL) injection 15 mg (15 mg IntraVENous Given 9/18/18 0019)  
ondansetron (ZOFRAN) injection 4 mg (4 mg IntraVENous Given 9/18/18 0018) magnesium sulfate 1 g/100 ml IVPB (premix or compounded) (1 g IntraVENous New Bag 9/18/18 0128) Medical Decision Making I am the first provider for this patient. I reviewed the vital signs, available nursing notes, past medical history, past surgical history, family history and social history. Vital Signs-Reviewed the patient's vital signs. Pulse Oximetry Analysis - 99% on RA Cardiac Monitor: 
Rate: 85 bpm 
Rhythm: NSR 
 
EKG interpretation: (Preliminary) 11:48 PM  
NSR, 85 bpm, QTc 440ms, No MITESH 
EKG read by Mike Murguia, DO at 11:43 PM  
 
Records Reviewed: Nursing Notes Provider Notes (Medical Decision Making): 
69 y/o female who presents upper quadrant abdominal pain radiating to back.  On exam, she has a temperature of 100 F, otherwise non-tachycardic and mildly hypertensive. Abdominal exam benign. Will check UA and screening lab work. Will Give her IV fluids and symptomatic relief with Zofran and Toradol. Procedures: 
Procedures ED Course:  
11:48 PM Initial assessment performed. The patients presenting problems have been discussed, and they are in agreement with the care plan formulated and outlined with them. I have encouraged them to ask questions as they arise throughout their visit. 2:55 AM UA indicative of UTI. CBC showing leukocytosis of 17.4 but lactate WNL at 1.8. Patient given Rocephin for coverage. CMLP showing elevated LFTs and bilirubin. RUQ US showing cholelithiasis with gallbladder wall of 3.7mm. Intrahepatic biliary duct dilatation concerning for choledocholithiasis. Discussed patient's history, exam, and available diagnostics results with Dr. David uBcio, surgery who recommends medical admission with GI consult.    
 
3:01 AMDiscussed patient's history, exam, and available diagnostics results with Giles Shetty MD, internal medicine, who agrees with admission to medical. 
 
 
Diagnosis and Disposition Critical Care Time: none Core Measures: 
For Hospitalized Patients: 
 
1. Hospitalization Decision Time: The decision to hospitalize the patient was made by Gigi Castaneda DO at 3:01 AM on 9/18/2018 2. Aspirin: Aspirin was not given because the patient did not present with a stroke at the time of their Emergency Department evaluation 3:01 AM  Patient is being admitted to the hospital by Giles Shetty MD. The results of their tests and reasons for their admission have been discussed with them and/or available family. They convey agreement and understanding for the need to be admitted and for their admission diagnosis. CONDITIONS ON ADMISSION: 
Sepsis is not present at the time of admission. Deep Vein Thrombosis is not present at the time of admission.  Thrombosis is not present at the time of admission. Urinary Tract Infection is present at the time of admission. Pneumonia is not present at the time of admission. MRSA is not present at the time of admission. Wound infection is not present at the time of admission. Pressure Ulcer is not present at the time of admission. CLINICAL IMPRESSION: 
 
1. Hypomagnesemia 2. Acute cystitis with hematuria 3. Elevated liver enzymes 4. Biliary calculus of other site with obstruction 5. Choledocholithiasis PLAN: 
1. ADMIT TO MEDICAL 
_______________________________ Attestations: This note is prepared by [facility], acting as Scribe for General Navid DO. General Navid DO:  The scribe's documentation has been prepared under my direction and personally reviewed by me in its entirety. I confirm that the note above accurately reflects all work, treatment, procedures, and medical decision making performed by me. 
_______________________________

## 2018-09-18 NOTE — CONSULTS
69 [address] Benita aMthias  MR#: [id number]  : 1955  ACCOUNT #: [de-identified]   DATE OF SERVICE: 2018    HISTORY OF PRESENT ILLNESS:  This is a 25-year-old female who was admitted during the night because yesterday morning she woke up with severe epigastric pain radiating to the back associated with nausea. She vomited on 4 occasions. She claimed that her urine has been getting progressively dark in the last week and yesterday she had some shaking chills and shivers. At the hospital, she had been running a maximum temperature of 100. She was started on ceftriaxone and her blood culture grew gram-negative rods. The patient has not had any more fever since 3 o'clock this morning. She was found to have jaundice with bilirubin of 5.9 with abnormal liver enzymes. Abdominal ultrasound showed stones in the gallbladder and also dilated common bile duct at 12 mm with dilated intrahepatic branches. There is mild gallbladder wall thickening at 3.7 cm with positive Woods sign. This is the first attack according to the patient. She does not smoke or drink alcohol. She does not have any dyspepsia, heartburns. She claims that usually she has regular bowel movement every day about 2 weeks ago she did have a small amount of rectal bleeding. She claimed that she never had any colonoscopy. She has no family history of cancer. She is living with her son. She is quite active. About 6 months ago, she was found to have high blood pressure and was started on medication while she was visiting [place]. She does not have any history of diabetes, coronary artery disease or a stroke. She never had any abdominal surgery. She did have 4 pregnancies and deliveries. ALLERGIES:  NO KNOWN DRUG ALLERGIES. HOME MEDICATIONS:  We have Norvasc 5 mg, Tenormin 50 mg twice a day.     FUNCTIONAL INQUIRY:  She denies any chest pain, shortness of breath, coughing, stroke, paralysis, numbness, loss of consciousness, dizziness. She has no dysuria, hematuria or burning sensation on voiding. No headaches. She claimed that she lost 3 pounds recently, but she is not sure what the reason behind. PHYSICAL EXAMINATION:  GENERAL:  We have a pleasant 67-year-old  female who does not speak any [place]. Her son and family were present to translate. She weighs 150 pounds. She does not appear in any distress at this time. VITAL SIGNS:  Temperature is 98.6, pulse 83, blood pressure 142/60, breathing 16, saturation 98% on room air. SKIN:  Normal.  No evidence of any rash. No stigmata of chronic liver disease. EYES:  Unremarkable for scleral icterus. The pupils are equal and reactive to light. The conjunctivae are pink. She does have bilateral xanthelasma. Oropharyngeal cavity:  She has moist and pink mucous membrane. Normal teeth. NECK:  Supple. No palpable mass. No enlarged thyroid. LUNGS:  Clear to auscultation. HEART:  Rhythm is regular. S1, S2 normal, no murmur. ABDOMEN:  Not distended. Very soft, nontender, no mass or organomegaly. Bowel sounds are normal.  EXTREMITIES:  Unremarkable. No pedal edema, no clubbing, no tremor. NEUROLOGIC:  No focal neurological signs. She is alert and oriented. LABORATORY STUDIES:  We have WBC of 17.4 at midnight, hemoglobin 13.3, MCV 77, MCH 26.7 both within normal limit. Platelets 439, 85% neutrophils and 3% lymphocytes, but no bandemia. Complete metabolic panel: We have a potassium 3.4, BUN 20, creatinine 1.2, glucose 203, magnesium 1.3, total bilirubin 5.9, , , alkaline phosphatase 271, lipase 163, lactic acid 2. Blood culture, aerobic and anaerobic grew gram-negative rods. We still are waiting for the identification. CONCLUSION:  This is a 67-year-old female with high blood pressure who presented with the first attack of biliary type pain.   She has on top of her gallstones in the gallbladder, she does have choledocholithiasis with obstruction. She appears to having septic cholangitis. We need to do an ERCP. This will be done tomorrow around 12:30 under MAC sedation. I explained to the family and the patient the procedure, the risks involved which include but not limited to infection, pancreatitis in 8% of the cases were sometimes it could be severe. There is also a small chance of bleeding or perforation where she may even require to have surgery. There is also a very small possibility that we may not be able to cannulate were in this case she may require to have a repeat procedure. They agreed to proceed. I told them that the alternative to this procedure. I explained to them that also that she needs to have her gallbladder removed sooner or later, but I have to speak with the surgeon to see whether they want me to proceed first or they would proceed first but with a notion of cholangitis, we would like to do it sooner. The patient is already on antibiotics, ceftriaxone and she has no more fever which is reassuring. She does have hypertension appears to be controlled with medication. She has 2 weeks ago a history of rectal bleeding. In my opinion needs to be investigated. This could be done as an outpatient with a colonoscopy. She does have bilateral xanthelasma. Possibly she has hypercholesterolemia. We need to check her lipid level. Further note to follow. ELIA Mckeon MD MBE / TRINH  D: 09/18/2018 14:21     T: 09/18/2018 14:56  JOB #: [id number]  CC: Kimberlyn Salcido MD

## 2018-09-18 NOTE — ROUTINE PROCESS
Bedside shift change report given to Darrick Jaeger RN (oncoming nurse) by Miriam Avila RN (offgoing nurse). Report included the following information SBAR, Kardex, MAR, Recent Results and Alarm Parameters .

## 2018-09-18 NOTE — ROUTINE PROCESS
TRANSFER - IN REPORT: 
 
Verbal report received from [facility] RN(name) on Sandrita Rodas  being received from ED(unit) for routine progression of care Report consisted of patients Situation, Background, Assessment and  
Recommendations(SBAR). Information from the following report(s) SBAR, Kardex, MAR, Recent Results and Alarm Parameters  was reviewed with the receiving nurse. Opportunity for questions and clarification was provided. Assessment completed upon patients arrival to unit and care assumed.

## 2018-09-18 NOTE — PROGRESS NOTES
Reason for Admission:   Choledocholithiasis;N/V, abdominal pain RRAT Score:    Low; 8 Plan for utilizing home health:     TBD Likelihood of Readmission:  Low Transition of Care Plan:       Physician follow up Pt admitted with N/V and  abdominal pain due to choledocholithiasis. Pt has a history of HTN. Pt is Greek speaking and her family is at bedside translating. Pt is independent and does not have/use any DME. Pt lives with her daughter and other family members live near by. Pt is currently with a surgery and GI consult pending. No transition of care needs have been identified at this time. Please encourage ambulation to assist with identifying potential transition of care needs. CM continuing to follow. Care Management Interventions Mode of Transport at Discharge: Other (see comment) (Family) Transition of Care Consult (CM Consult): Discharge Planning Health Maintenance Reviewed: Yes Current Support Network: [facility], Family Lives [place] Confirm Follow Up Transport: Family Plan discussed with Pt/Family/Caregiver: Yes Discharge Location Discharge Placement: Home with family assistance

## 2018-09-18 NOTE — ED NOTES
TRANSFER - OUT REPORT: 
 
Verbal report given to Miriam Avila RN on Charisse Leaks  being transferred to medical for routine progression of care Report consisted of patients Situation, Background, Assessment and  
Recommendations(SBAR). Information from the following report(s) SBAR, ED Summary, [facility] and Recent Results was reviewed with the receiving nurse. Lines:  
Peripheral IV 09/18/18 Right Antecubital (Active) Site Assessment Clean, dry, & intact 9/18/2018 12:00 AM  
Phlebitis Assessment 0 9/18/2018 12:00 AM  
Infiltration Assessment 0 9/18/2018 12:00 AM  
Dressing Status Clean, dry, & intact 9/18/2018 12:00 AM  
Dressing Type 4 X 4 9/18/2018 12:00 AM  
Hub Color/Line Status Patent; Flushed 9/18/2018 12:00 AM  
Alcohol Cap Used Yes 9/18/2018 12:00 AM  
  
 
Opportunity for questions and clarification was provided. Patient transported with: 
 [employer]

## 2018-09-18 NOTE — H&P
History & Physical 
 
Patient: Miranda Boyle MRN: [id number]  CSN: [id number] YOB: 1955  Age: 70 y.o. Sex: female DOA: 9/17/2018 Primary Care Provider:  None Assessment/Plan Patient Active Problem List  
Diagnosis Code  Choledocholithiasis K80.50  Hypomagnesemia E83.42  
 UTI (urinary tract infection) N39.0  Hypertension I10 Admit to medical floor Choledocholithiasis - US showed Cholelithiasis with mild gallbladder wall thickening.   
Dilated common bile duct up to 12 mm and dilated intrahepatic biliary ducts. Consider choledocholithiasis. Will start on ceftriaxone, General surgery consulted by ER Will consult GI to extract stone NPO 
IVF Pain control. HTN - continue home medications. UA not convincing of UTI, however she is already getting ceftriaxone for above. Hypomagnesemia - replace and follow up magnesium levels. DVT prophylaxis Estimated length of stay : 2-3 days CC: N/V, abdominal pain HPI:  
 
Miranda Boyle is a 70 y.o. female who has past history of HTN presents to ER with concerns of N/V abdominal pain. Patient is Vietnamese speaking, son helped with interpritation. Patient ate dinner last night and after that she started with nausea/vomiting along with abdominal. Abdominal pain located epigastric and RUQ and at times radiating to back. Subjective fever and chills, denies any diarrhea. She denies any similar problems before. In ER he wbc elevated. Her US showed Cholelithiasis with mild gallbladder wall thickening. Dilated common bile duct up to 12 mm and dilated intrahepatic biliary ducts. Consider choledocholithiasis. Past Medical History:  
Diagnosis Date  Hypertension History reviewed. No pertinent surgical history. History reviewed. No pertinent family history. Social History Social History  Marital status:    Spouse name: N/A  
 Number of children: N/A  
  Years of education: N/A Social History Main Topics  Smoking status: Never Smoker  Smokeless tobacco: Never Used  Alcohol use No  
 Drug use: None  Sexual activity: Not Asked Other Topics Concern  None Social History Narrative  None Prior to Admission medications Medication Sig Start Date End Date Taking? Authorizing Provider  
atenolol (TENORMIN) 50 mg tablet Take 50 mg by mouth two (2) times a day. Yes Phys Other, MD  
amLODIPine (NORVASC) 5 mg tablet Take 5 mg by mouth daily. Yes Phys Other, MD  
 
 
No Known Allergies Review of Systems Gen: No fever, chills, malaise, weight loss/gain. Heent: No headache, rhinorrhea, epistaxis, ear pain, hearing loss, sinus pain, neck pain/stiffness, sore throat. Heart: No chest pain, palpitations, PRUITT, pnd, or orthopnea. Resp: No cough, hemoptysis, wheezing and shortness of breath. GI: see above : No urinary obstruction, dysuria or hematuria. Derm: No rash, new skin lesion or pruritis. Musc/skeletal: no bone or joint complains. Vasc: No edema, cyanosis or claudication. Endo: No heat/cold intolerance, no polyuria,polydipsia or polyphagia. Neuro: No unilateral weakness, numbness, tingling. No seizures. Heme: No easy bruising or bleeding. Physical Exam:  
 
Physical Exam: 
Visit Vitals  /68 (BP 1 Location: Left arm, BP Patient Position: At rest)  Pulse 88  Temp 99 °F (37.2 °C)  Resp 15  Ht 4' 11\" (1.499 m)  Wt 68 kg (150 lb)  SpO2 100%  BMI 30.3 kg/m2 O2 Device: Room air Temp (24hrs), Av.4 °F (37.4 °C), Min:99 °F (37.2 °C), Max:100 °F (37.8 °C) General:  Awake, cooperative, no distress. Head:  Normocephalic, without obvious abnormality, atraumatic. Eyes:  Conjunctivae/corneas clear, sclera anicteric, PERRL, EOMs intact. Nose: Nares normal. No drainage or sinus tenderness.   
Throat: Lips, mucosa, and tongue normal.   
 Neck: Supple, symmetrical, trachea midline, no adenopathy. Lungs:   Clear to auscultation bilaterally. Heart:  Regular rate and rhythm, S1, S2 normal, no murmur, click, rub or gallop. Abdomen: Soft, epigastric and RUQ-tender. Bowel sounds normal. No masses,  No organomegaly. Extremities: Extremities normal, atraumatic, no cyanosis or edema. Capillary refill normal.  
Pulses: 2+ and symmetric all extremities. Skin: Skin color pink, turgor normal. No rashes or lesions Neurologic: CNII-XII intact. No focal motor or sensory deficit. Labs Reviewed: 
 
CMP:  
Lab Results Component Value Date/Time  09/18/2018 12:00 AM  
 K 3.4 (L) 09/18/2018 12:00 AM  
  09/18/2018 12:00 AM  
 CO2 24 09/18/2018 12:00 AM  
 AGAP 16 09/18/2018 12:00 AM  
  (H) 09/18/2018 12:00 AM  
 BUN 20 (H) 09/18/2018 12:00 AM  
 CREA 1.20 09/18/2018 12:00 AM  
 GFRAA 55 (L) 09/18/2018 12:00 AM  
 GFRNA 45 (L) 09/18/2018 12:00 AM  
 CA 9.5 09/18/2018 12:00 AM  
 MG 1.3 (L) 09/18/2018 12:00 AM  
 ALB 4.0 09/18/2018 12:00 AM  
 TP 8.5 (H) 09/18/2018 12:00 AM  
 GLOB 4.5 (H) 09/18/2018 12:00 AM  
 AGRAT 0.9 09/18/2018 12:00 AM  
 SGOT 259 (H) 09/18/2018 12:00 AM  
  (H) 09/18/2018 12:00 AM  
 
CBC:  
Lab Results Component Value Date/Time WBC 17.4 (H) 09/18/2018 12:00 AM  
 HGB 13.3 09/18/2018 12:00 AM  
 HCT 38.7 09/18/2018 12:00 AM  
  09/18/2018 12:00 AM  
 
 
 
Procedures/imaging: see electronic medical records for all procedures/Xrays and details which were not copied into this note but were reviewed prior to creation of Plan CC: None

## 2018-09-18 NOTE — PROGRESS NOTES
7746- Received pt to room 328 via wheelchair. Daughter and son at bedside. 56- Patient signed waiver for  services and requested daughter, Syeda Kenney and son Juan Palacios interpret for her. Pain 3/10 to abdomen. No nausea at this time. Son at bedside. Call light within reach. Pt instructed not to eat or drink anything until orders are placed by physicians. 6053- Dr. Janeth Escamilla on unit to see patient. Patient Vitals for the past 4 hrs: 
 Temp Pulse Resp BP SpO2  
09/18/18 0400 99 °F (37.2 °C) 88 15 134/68 100 % 09/18/18 0330 99.3 °F (37.4 °C) 92 15 130/47 100 % 09/18/18 0300 - 85 20 150/59 98 % 09/18/18 0230 - 88 18 141/58 98 %

## 2018-09-18 NOTE — PROGRESS NOTES
Xcoverage note Rec'd call that blood cx with gram negative bacteremia. Continue ceftriaxone. Add Cipro for double gram negative coverage. Repeat blood cultures tomorrow. Dr. Karson Shaffer

## 2018-09-18 NOTE — PROGRESS NOTES
8090 - Patient in bed at this time. A/O x 4, as far as able to assess. Son at bedside translating. IV to right AC  intact and patent. No numbness/tingling. Pedal and radial pulses palpable. Lungs CTA. Bowel sounds active to all quadrants. Pain 3/10.  
 
 
0915-Dr. Miguelina Chavarria informed of patient BP of 101/49 and agrees with BP meds of Tenolol and Norvasc being held. Dr. Miguelina Chavarria asked if Heparin should be held for any procedures and he said given heparin. Maryan Record from lab called critical lab values of gram negative rods to anerobic blood cultures x 2 bottles, none to aerobic. Dr. Rose Rene on floor and notified at [phone]. 
2573-Dr. Miguelina Chavarria paged d/t critical lab values. 1500-Danette Sol called with lab results of gram negative rods in aerobic bottle that was collected at [address] 
1515-Dr. Miguelina Chavarria paged. Return call and informed of 1 aerobic bottle (0016 collection) and 2 anaerobic bottles (0000, and 0016 collection), from cultures showing gram negative rods, no new orders. Shift summary- c/o slight pain. ATB given IV without difficulty. Started on Cipro-first dose given. Ambulates to bathroom. Family at bedside to translate. Tolerating clear liquids. Should be NPO after 2 am for ERCP tomorrow at 1 pm, per Dr. Rose Rene.

## 2018-09-18 NOTE — ED NOTES
Nursing education provided regarding medication intended effects as well as possible side effects. Pt expressed understanding. Opportunity for questions provided.

## 2018-09-18 NOTE — PROGRESS NOTES
1932- Released orders for ERCP placed by Dr. Sedrick Parra. Orders adjusted for correct dates and times for ERCP scheduled for tomorrow not today. 2013Asussy Chavez from lab called with critical result, gram negative rods in aerobic bottle. Paged Dr. Dina Pizarro notified, no new orders given. Shift Summary- No complaints of pain nausea or vomiting. Pt requesting something to help her have a bowel movement. Patient Vitals for the past 12 hrs: 
 Temp Pulse Resp BP SpO2  
09/19/18 0335 98.8 °F (37.1 °C) 78 18 170/76 99 % 09/18/18 2314 98.4 °F (36.9 °C) 87 16 141/65 98 % 09/18/18 1930 99.4 °F (37.4 °C) (!) 101 18 158/66 99 %

## 2018-09-18 NOTE — ROUTINE PROCESS
Bedside and Verbal shift change report given to KIET Saravia RN (oncoming nurse) by [employer] RN (offgoing nurse). Report included the following information SBAR, Kardex, Intake/Output and MAR.

## 2018-09-19 ENCOUNTER — APPOINTMENT (OUTPATIENT)
Dept: MRI IMAGING | Age: 63
DRG: 445 | End: 2018-09-19
Attending: INTERNAL MEDICINE
Payer: MEDICARE

## 2018-09-19 ENCOUNTER — APPOINTMENT (OUTPATIENT)
Dept: GENERAL RADIOLOGY | Age: 63
DRG: 445 | End: 2018-09-19
Attending: INTERNAL MEDICINE
Payer: MEDICARE

## 2018-09-19 ENCOUNTER — ANESTHESIA (OUTPATIENT)
Dept: ENDOSCOPY | Age: 63
DRG: 445 | End: 2018-09-19
Payer: MEDICARE

## 2018-09-19 LAB
ALBUMIN SERPL-MCNC: 3 G/DL (ref 3.4–5)
ALBUMIN/GLOB SERPL: 0.7 {RATIO} (ref 0.8–1.7)
ALP SERPL-CCNC: 207 U/L (ref 45–117)
ALT SERPL-CCNC: 196 U/L (ref 13–56)
ANION GAP SERPL CALC-SCNC: 12 MMOL/L (ref 3–18)
AST SERPL-CCNC: 57 U/L (ref 15–37)
BASOPHILS # BLD: 0 K/UL (ref 0–0.1)
BASOPHILS NFR BLD: 0 % (ref 0–2)
BILIRUB SERPL-MCNC: 3.8 MG/DL (ref 0.2–1)
BUN SERPL-MCNC: 13 MG/DL (ref 7–18)
BUN/CREAT SERPL: 13 (ref 12–20)
CALCIUM SERPL-MCNC: 8.8 MG/DL (ref 8.5–10.1)
CHLORIDE SERPL-SCNC: 107 MMOL/L (ref 100–108)
CHOLEST SERPL-MCNC: 193 MG/DL
CO2 SERPL-SCNC: 24 MMOL/L (ref 21–32)
CREAT SERPL-MCNC: 0.98 MG/DL (ref 0.6–1.3)
DIFFERENTIAL METHOD BLD: ABNORMAL
EOSINOPHIL # BLD: 0.1 K/UL (ref 0–0.4)
EOSINOPHIL NFR BLD: 1 % (ref 0–5)
ERYTHROCYTE [DISTWIDTH] IN BLOOD BY AUTOMATED COUNT: 14.4 % (ref 11.6–14.5)
EST. AVERAGE GLUCOSE BLD GHB EST-MCNC: 131 MG/DL
GLOBULIN SER CALC-MCNC: 4.1 G/DL (ref 2–4)
GLUCOSE BLD STRIP.AUTO-MCNC: 97 MG/DL (ref 70–110)
GLUCOSE SERPL-MCNC: 109 MG/DL (ref 74–99)
HBA1C MFR BLD: 6.2 % (ref 4.5–5.6)
HCT VFR BLD AUTO: 37.3 % (ref 35–45)
HDLC SERPL-MCNC: 14 MG/DL (ref 40–60)
HDLC SERPL: 13.8 {RATIO} (ref 0–5)
HGB BLD-MCNC: 12.4 G/DL (ref 12–16)
LDLC SERPL CALC-MCNC: 138.8 MG/DL (ref 0–100)
LIPASE SERPL-CCNC: 233 U/L (ref 73–393)
LIPID PROFILE,FLP: ABNORMAL
LYMPHOCYTES # BLD: 1.5 K/UL (ref 0.9–3.6)
LYMPHOCYTES NFR BLD: 12 % (ref 21–52)
MCH RBC QN AUTO: 26.2 PG (ref 24–34)
MCHC RBC AUTO-ENTMCNC: 33.2 G/DL (ref 31–37)
MCV RBC AUTO: 78.7 FL (ref 74–97)
MONOCYTES # BLD: 0.8 K/UL (ref 0.05–1.2)
MONOCYTES NFR BLD: 7 % (ref 3–10)
NEUTS SEG # BLD: 10.3 K/UL (ref 1.8–8)
NEUTS SEG NFR BLD: 80 % (ref 40–73)
PLATELET # BLD AUTO: 250 K/UL (ref 135–420)
PMV BLD AUTO: 9.8 FL (ref 9.2–11.8)
POTASSIUM SERPL-SCNC: 3.4 MMOL/L (ref 3.5–5.5)
PROT SERPL-MCNC: 7.1 G/DL (ref 6.4–8.2)
RBC # BLD AUTO: 4.74 M/UL (ref 4.2–5.3)
SODIUM SERPL-SCNC: 143 MMOL/L (ref 136–145)
TRIGL SERPL-MCNC: 201 MG/DL (ref ?–150)
VLDLC SERPL CALC-MCNC: 40.2 MG/DL
WBC # BLD AUTO: 12.7 K/UL (ref 4.6–13.2)

## 2018-09-19 PROCEDURE — C9113 INJ PANTOPRAZOLE SODIUM, VIA: HCPCS | Performed by: HOSPITALIST

## 2018-09-19 PROCEDURE — 77030031313 HC SPHNTOM CLEVER CUT OCOA -C: Performed by: INTERNAL MEDICINE

## 2018-09-19 PROCEDURE — 77030032490 HC SLV COMPR SCD KNE COVD -B: Performed by: INTERNAL MEDICINE

## 2018-09-19 PROCEDURE — 74011250636 HC RX REV CODE- 250/636

## 2018-09-19 PROCEDURE — 83036 HEMOGLOBIN GLYCOSYLATED A1C: CPT | Performed by: INTERNAL MEDICINE

## 2018-09-19 PROCEDURE — 74011250636 HC RX REV CODE- 250/636: Performed by: HOSPITALIST

## 2018-09-19 PROCEDURE — 74011250637 HC RX REV CODE- 250/637: Performed by: HOSPITALIST

## 2018-09-19 PROCEDURE — 80061 LIPID PANEL: CPT | Performed by: INTERNAL MEDICINE

## 2018-09-19 PROCEDURE — 0FC98ZZ EXTIRPATION OF MATTER FROM COMMON BILE DUCT, VIA NATURAL OR ARTIFICIAL OPENING ENDOSCOPIC: ICD-10-PCS | Performed by: INTERNAL MEDICINE

## 2018-09-19 PROCEDURE — 87077 CULTURE AEROBIC IDENTIFY: CPT | Performed by: HOSPITALIST

## 2018-09-19 PROCEDURE — 74011250637 HC RX REV CODE- 250/637: Performed by: INTERNAL MEDICINE

## 2018-09-19 PROCEDURE — 74011250636 HC RX REV CODE- 250/636: Performed by: ANESTHESIOLOGY

## 2018-09-19 PROCEDURE — 74330 X-RAY BILE/PANC ENDOSCOPY: CPT

## 2018-09-19 PROCEDURE — 77010033678 HC OXYGEN DAILY

## 2018-09-19 PROCEDURE — 76060000034 HC ANESTHESIA 1.5 TO 2 HR: Performed by: INTERNAL MEDICINE

## 2018-09-19 PROCEDURE — 77030020256 HC SOL INJ NACL 0.9%  500ML: Performed by: INTERNAL MEDICINE

## 2018-09-19 PROCEDURE — 85025 COMPLETE CBC W/AUTO DIFF WBC: CPT | Performed by: INTERNAL MEDICINE

## 2018-09-19 PROCEDURE — 77030031311 HC BSKT BILI STN RTVR OCOA -C: Performed by: INTERNAL MEDICINE

## 2018-09-19 PROCEDURE — 87070 CULTURE OTHR SPECIMN AEROBIC: CPT | Performed by: HOSPITALIST

## 2018-09-19 PROCEDURE — 82962 GLUCOSE BLOOD TEST: CPT

## 2018-09-19 PROCEDURE — 80053 COMPREHEN METABOLIC PANEL: CPT | Performed by: INTERNAL MEDICINE

## 2018-09-19 PROCEDURE — C1769 GUIDE WIRE: HCPCS | Performed by: INTERNAL MEDICINE

## 2018-09-19 PROCEDURE — 74011250636 HC RX REV CODE- 250/636: Performed by: INTERNAL MEDICINE

## 2018-09-19 PROCEDURE — 87040 BLOOD CULTURE FOR BACTERIA: CPT | Performed by: HOSPITALIST

## 2018-09-19 PROCEDURE — 87186 SC STD MICRODIL/AGAR DIL: CPT | Performed by: HOSPITALIST

## 2018-09-19 PROCEDURE — 83690 ASSAY OF LIPASE: CPT | Performed by: INTERNAL MEDICINE

## 2018-09-19 PROCEDURE — 74011000250 HC RX REV CODE- 250: Performed by: HOSPITALIST

## 2018-09-19 PROCEDURE — 65270000029 HC RM PRIVATE

## 2018-09-19 PROCEDURE — 36415 COLL VENOUS BLD VENIPUNCTURE: CPT | Performed by: HOSPITALIST

## 2018-09-19 PROCEDURE — 76040000009: Performed by: INTERNAL MEDICINE

## 2018-09-19 RX ORDER — SODIUM CHLORIDE 0.9 % (FLUSH) 0.9 %
5-10 SYRINGE (ML) INJECTION AS NEEDED
Status: DISCONTINUED | OUTPATIENT
Start: 2018-09-19 | End: 2018-09-23 | Stop reason: HOSPADM

## 2018-09-19 RX ORDER — MAGNESIUM SULFATE 100 %
4 CRYSTALS MISCELLANEOUS AS NEEDED
Status: DISCONTINUED | OUTPATIENT
Start: 2018-09-19 | End: 2018-09-23 | Stop reason: HOSPADM

## 2018-09-19 RX ORDER — FENTANYL CITRATE 50 UG/ML
25 INJECTION, SOLUTION INTRAMUSCULAR; INTRAVENOUS AS NEEDED
Status: DISCONTINUED | OUTPATIENT
Start: 2018-09-19 | End: 2018-09-23 | Stop reason: HOSPADM

## 2018-09-19 RX ORDER — GLYCOPYRROLATE 0.2 MG/ML
INJECTION INTRAMUSCULAR; INTRAVENOUS AS NEEDED
Status: SHIPPED | OUTPATIENT
Start: 2018-09-19

## 2018-09-19 RX ORDER — DEXTROSE 50 % IN WATER (D50W) INTRAVENOUS SYRINGE
25-50 AS NEEDED
Status: DISCONTINUED | OUTPATIENT
Start: 2018-09-19 | End: 2018-09-23 | Stop reason: HOSPADM

## 2018-09-19 RX ORDER — KETOROLAC TROMETHAMINE 30 MG/ML
INJECTION, SOLUTION INTRAMUSCULAR; INTRAVENOUS AS NEEDED
Status: SHIPPED | OUTPATIENT
Start: 2018-09-19

## 2018-09-19 RX ORDER — MIDAZOLAM HYDROCHLORIDE 1 MG/ML
INJECTION, SOLUTION INTRAMUSCULAR; INTRAVENOUS AS NEEDED
Status: SHIPPED | OUTPATIENT
Start: 2018-09-19

## 2018-09-19 RX ORDER — INSULIN LISPRO 100 [IU]/ML
INJECTION, SOLUTION INTRAVENOUS; SUBCUTANEOUS ONCE
Status: ACTIVE | OUTPATIENT
Start: 2018-09-19 | End: 2018-09-20

## 2018-09-19 RX ORDER — SODIUM CHLORIDE, SODIUM LACTATE, POTASSIUM CHLORIDE, CALCIUM CHLORIDE 600; 310; 30; 20 MG/100ML; MG/100ML; MG/100ML; MG/100ML
INJECTION, SOLUTION INTRAVENOUS
Status: SHIPPED | OUTPATIENT
Start: 2018-09-19

## 2018-09-19 RX ORDER — FENTANYL CITRATE 50 UG/ML
50 INJECTION, SOLUTION INTRAMUSCULAR; INTRAVENOUS
Status: DISCONTINUED | OUTPATIENT
Start: 2018-09-19 | End: 2018-09-23 | Stop reason: HOSPADM

## 2018-09-19 RX ORDER — FENTANYL CITRATE 50 UG/ML
INJECTION, SOLUTION INTRAMUSCULAR; INTRAVENOUS AS NEEDED
Status: SHIPPED | OUTPATIENT
Start: 2018-09-19

## 2018-09-19 RX ORDER — NEOSTIGMINE METHYLSULFATE 5 MG/5 ML
SYRINGE (ML) INTRAVENOUS AS NEEDED
Status: SHIPPED | OUTPATIENT
Start: 2018-09-19

## 2018-09-19 RX ORDER — ONDANSETRON 2 MG/ML
INJECTION INTRAMUSCULAR; INTRAVENOUS AS NEEDED
Status: SHIPPED | OUTPATIENT
Start: 2018-09-19

## 2018-09-19 RX ORDER — NALOXONE HYDROCHLORIDE 0.4 MG/ML
0.1 INJECTION, SOLUTION INTRAMUSCULAR; INTRAVENOUS; SUBCUTANEOUS AS NEEDED
Status: DISCONTINUED | OUTPATIENT
Start: 2018-09-19 | End: 2018-09-23 | Stop reason: HOSPADM

## 2018-09-19 RX ORDER — LIDOCAINE HYDROCHLORIDE 20 MG/ML
INJECTION, SOLUTION EPIDURAL; INFILTRATION; INTRACAUDAL; PERINEURAL AS NEEDED
Status: SHIPPED | OUTPATIENT
Start: 2018-09-19

## 2018-09-19 RX ORDER — HYDROMORPHONE HYDROCHLORIDE 2 MG/ML
0.5 INJECTION, SOLUTION INTRAMUSCULAR; INTRAVENOUS; SUBCUTANEOUS
Status: DISCONTINUED | OUTPATIENT
Start: 2018-09-19 | End: 2018-09-23 | Stop reason: HOSPADM

## 2018-09-19 RX ORDER — ESMOLOL HYDROCHLORIDE 10 MG/ML
INJECTION INTRAVENOUS AS NEEDED
Status: SHIPPED | OUTPATIENT
Start: 2018-09-19

## 2018-09-19 RX ORDER — SODIUM CHLORIDE 9 MG/ML
125 INJECTION, SOLUTION INTRAVENOUS CONTINUOUS
Status: DISCONTINUED | OUTPATIENT
Start: 2018-09-19 | End: 2018-09-20

## 2018-09-19 RX ORDER — ONDANSETRON 2 MG/ML
4 INJECTION INTRAMUSCULAR; INTRAVENOUS ONCE
Status: DISPENSED | OUTPATIENT
Start: 2018-09-19 | End: 2018-09-20

## 2018-09-19 RX ORDER — ROCURONIUM BROMIDE 10 MG/ML
INJECTION, SOLUTION INTRAVENOUS AS NEEDED
Status: SHIPPED | OUTPATIENT
Start: 2018-09-19

## 2018-09-19 RX ORDER — PROPOFOL 10 MG/ML
INJECTION, EMULSION INTRAVENOUS AS NEEDED
Status: SHIPPED | OUTPATIENT
Start: 2018-09-19

## 2018-09-19 RX ORDER — FLUMAZENIL 0.1 MG/ML
0.2 INJECTION INTRAVENOUS
Status: DISCONTINUED | OUTPATIENT
Start: 2018-09-19 | End: 2018-09-23 | Stop reason: HOSPADM

## 2018-09-19 RX ADMIN — SODIUM CHLORIDE 125 ML/HR: 900 INJECTION, SOLUTION INTRAVENOUS at 23:01

## 2018-09-19 RX ADMIN — SODIUM CHLORIDE 125 ML/HR: 900 INJECTION, SOLUTION INTRAVENOUS at 14:58

## 2018-09-19 RX ADMIN — ESMOLOL HYDROCHLORIDE 10 MG: 10 INJECTION INTRAVENOUS at 15:16

## 2018-09-19 RX ADMIN — ROCURONIUM BROMIDE 10 MG: 10 INJECTION, SOLUTION INTRAVENOUS at 15:52

## 2018-09-19 RX ADMIN — CIPROFLOXACIN 400 MG: 2 INJECTION, SOLUTION INTRAVENOUS at 19:03

## 2018-09-19 RX ADMIN — GLYCOPYRROLATE 0.4 MG: 0.2 INJECTION INTRAMUSCULAR; INTRAVENOUS at 16:41

## 2018-09-19 RX ADMIN — WATER 2 G: 1 INJECTION INTRAMUSCULAR; INTRAVENOUS; SUBCUTANEOUS at 12:57

## 2018-09-19 RX ADMIN — SODIUM CHLORIDE, SODIUM LACTATE, POTASSIUM CHLORIDE, CALCIUM CHLORIDE: 600; 310; 30; 20 INJECTION, SOLUTION INTRAVENOUS at 16:36

## 2018-09-19 RX ADMIN — AMLODIPINE BESYLATE 5 MG: 5 TABLET ORAL at 09:18

## 2018-09-19 RX ADMIN — KETOROLAC TROMETHAMINE 15 MG: 30 INJECTION, SOLUTION INTRAMUSCULAR; INTRAVENOUS at 16:45

## 2018-09-19 RX ADMIN — PROPOFOL 140 MG: 10 INJECTION, EMULSION INTRAVENOUS at 15:28

## 2018-09-19 RX ADMIN — PROPOFOL 20 MG: 10 INJECTION, EMULSION INTRAVENOUS at 16:23

## 2018-09-19 RX ADMIN — CIPROFLOXACIN 400 MG: 2 INJECTION, SOLUTION INTRAVENOUS at 05:36

## 2018-09-19 RX ADMIN — ATENOLOL 50 MG: 50 TABLET ORAL at 09:18

## 2018-09-19 RX ADMIN — Medication 10 ML: at 13:02

## 2018-09-19 RX ADMIN — FENTANYL CITRATE 25 MCG: 50 INJECTION, SOLUTION INTRAMUSCULAR; INTRAVENOUS at 15:18

## 2018-09-19 RX ADMIN — SODIUM CHLORIDE 125 ML/HR: 900 INJECTION, SOLUTION INTRAVENOUS at 05:36

## 2018-09-19 RX ADMIN — ATENOLOL 50 MG: 50 TABLET ORAL at 23:01

## 2018-09-19 RX ADMIN — SODIUM CHLORIDE, SODIUM LACTATE, POTASSIUM CHLORIDE, CALCIUM CHLORIDE: 600; 310; 30; 20 INJECTION, SOLUTION INTRAVENOUS at 15:08

## 2018-09-19 RX ADMIN — LIDOCAINE HYDROCHLORIDE 40 MG: 20 INJECTION, SOLUTION EPIDURAL; INFILTRATION; INTRACAUDAL; PERINEURAL at 15:28

## 2018-09-19 RX ADMIN — INDOMETHACIN 100 MG: 50 SUPPOSITORY RECTAL at 14:49

## 2018-09-19 RX ADMIN — SODIUM CHLORIDE 40 MG: 9 INJECTION INTRAMUSCULAR; INTRAVENOUS; SUBCUTANEOUS at 09:18

## 2018-09-19 RX ADMIN — MIDAZOLAM HYDROCHLORIDE 1 MG: 1 INJECTION, SOLUTION INTRAMUSCULAR; INTRAVENOUS at 15:08

## 2018-09-19 RX ADMIN — Medication 2 MG: at 16:41

## 2018-09-19 RX ADMIN — ROCURONIUM BROMIDE 30 MG: 10 INJECTION, SOLUTION INTRAVENOUS at 15:28

## 2018-09-19 RX ADMIN — ONDANSETRON 4 MG: 2 INJECTION INTRAMUSCULAR; INTRAVENOUS at 16:15

## 2018-09-19 NOTE — PROGRESS NOTES
Gastrointestinal Progress Note Patient Name: Wilber Monk Today's Date: 9/19/2018 Admit Date: 9/17/2018 Subjective:  
 
Diet: Patient is on NPO and is tolerating. Nausea is not present. Vomiting is not present. Pain: Patient does not complain of abdominal pain. Since last night. Bowel Movements: None Bleeding:  None Current Facility-Administered Medications Medication Dose Route Frequency  amLODIPine (NORVASC) tablet 5 mg  5 mg Oral DAILY  atenolol (TENORMIN) tablet 50 mg  50 mg Oral BID  sodium chloride (NS) flush 5-10 mL  5-10 mL IntraVENous Q8H  
 sodium chloride (NS) flush 5-10 mL  5-10 mL IntraVENous PRN  
 0.9% sodium chloride infusion  125 mL/hr IntraVENous CONTINUOUS  
 cefTRIAXone (ROCEPHIN) 2 g in sterile water (preservative free) 20 mL IV syringe  2 g IntraVENous Q24H  
 heparin (porcine) injection 5,000 Units  5,000 Units SubCUTAneous Q8H  
 pantoprazole (PROTONIX) 40 mg in sodium chloride 0.9% 10 mL injection  40 mg IntraVENous DAILY  morphine injection 2 mg  2 mg IntraVENous Q4H PRN  
 ciprofloxacin (CIPRO) 400 mg IVPB (premix)  400 mg IntraVENous Q12H Objective:  
 
Visit Vitals  /77 (BP 1 Location: Left arm, BP Patient Position: At rest)  Pulse 89  Temp 98.3 °F (36.8 °C)  Resp 18  Ht 4' 11\" (1.499 m)  Wt 66.5 kg (146 lb 11.2 oz)  SpO2 99%  BMI 29.63 kg/m2  
 
 
09/17 1901 - 09/19 0700 In: 3177.1 [I.V.:3177.1] Out: - General appearance: alert, cooperative, no distress, appears stated age Abdomen: soft, non-tender. Bowel sounds normal. No masses,  no organomegaly Data Review: 
 
Labs: Results:  
   
Chemistry Recent Labs  
   09/19/18 
 0500  09/18/18 
 0000 GLU  109*  203* NA  143  140  
K  3.4*  3.4*  
CL  107  100 CO2  24  24 BUN  13  20* CREA  0.98  1.20 CA  8.8  9.5 AGAP  12  16 BUCR  13  17 AP  207*  271* TP  7.1  8.5* ALB  3.0*  4.0  
GLOB  4.1*  4.5*  
 AGRAT  0.7*  0.9  
  
CBC w/Diff Recent Labs  
   09/19/18 
 0500  09/18/18 
 0000 WBC  12.7  17.4*  
RBC  4.74  4.99  
HGB  12.4  13.3 HCT  37.3  38.7 PLT  250  323 GRANS  80*  95* LYMPH  12*  3* EOS  1  0 Coagulation No results for input(s): PTP, INR, APTT in the last 72 hours. No lab exists for component: INREXT Liver Enzymes Recent Labs  
   09/19/18 
 0500 TP  7.1 ALB  3.0* AP  207* SGOT  57* Assessment:  
 
Active Problems: 
  Choledocholithiasis (9/18/2018) Hypomagnesemia (9/18/2018) UTI (urinary tract infection) (9/18/2018) Hypertension () Plan:  
 
Cholangitis to gram negative rods is related to choledocholithiasis. No pain since yesterday. LFT are improving. She may have passed the stone? ?  Plan would do MRCP today if positive then would proceede for ERCP Matthew Melissa MD 
September 19, 2018

## 2018-09-19 NOTE — PROGRESS NOTES
I thought about it with a bacteremia AEROBIC AND ANAEROBIC BOTTLES GRAM NEGATIVE RODS and dilated CBD to 12 mm not just borderline. I think we should go directly to ERCP and not waist time and money in doing an MRCP to confirm what we already have very high suspicion off. So I cancelled the MRCP. We are ready for her in the or for the ERCP. I already explained to the son the risks involved and alternative. Mostly pancreatitis in 8 %. We are going to giver her plenty of fluids and Indocin suppository 100 mg for prophylaxis. She is already on Rocephin. (ceftriaxone IV)

## 2018-09-19 NOTE — ANESTHESIA POSTPROCEDURE EVALUATION
Post-Anesthesia Evaluation and Assessment Cardiovascular Function/Vital Signs Visit Vitals  /70  Pulse 62  Temp 36.8 °C (98.2 °F)  Resp 17  Ht 4' 11\" (1.499 m)  Wt 66.9 kg (147 lb 6.4 oz)  SpO2 100%  BMI 29.77 kg/m2 Patient is status post Procedure(s): ENDOSCOPIC RETROGRADE CHOLANGIOPANCREATOGRAPHY (ERCP) W/C-ARM; SPHINCTEROTOMY; STONE REMOVAL. Nausea/Vomiting: Controlled. Postoperative hydration reviewed and adequate. Pain: 
Pain Scale 1: Visual (09/19/18 1745) Pain Intensity 1: 0 (09/19/18 1745) Managed. Neurological Status:  
Neuro (WDL): Within Defined Limits (09/19/18 1730) At baseline. Mental Status and Level of Consciousness: Arousable. Pulmonary Status:  
O2 Device: Nasal cannula (09/19/18 1730) Adequate oxygenation and airway patent. Complications related to anesthesia: None Post-anesthesia assessment completed. No concerns. Patient has met all discharge requirements. Signed By: Javier Garnett MD  
 September 19, 2018

## 2018-09-19 NOTE — ANESTHESIA PREPROCEDURE EVALUATION
Anesthetic History No history of anesthetic complications Review of Systems / Medical History Patient summary reviewed, nursing notes reviewed and pertinent labs reviewed Pulmonary Within defined limits Neuro/Psych Within defined limits Cardiovascular Hypertension: well controlled Exercise tolerance: >4 METS 
  
GI/Hepatic/Renal 
  
 
 
 
Liver disease Endo/Other Obesity Other Findings Comments: Patient feels warm and has an elevated heart rate. Dr. Lashae Powell said we need to do this and is not elective. Physical Exam 
 
Airway Mallampati: III 
TM Distance: < 4 cm Neck ROM: normal range of motion Mouth opening: Normal 
 
 Cardiovascular Rhythm: regular Rate: normal 
 
 
 
 Dental 
No notable dental hx 
 
Comments: Broken teeth, many missing on top. None loose Pulmonary Breath sounds clear to auscultation Abdominal 
GI exam deferred Other Findings Anesthetic Plan ASA: 3 Anesthesia type: general 
 
 
 
 
Induction: Intravenous Anesthetic plan and risks discussed with: Patient

## 2018-09-19 NOTE — PROGRESS NOTES
Problem: Falls - Risk of 
Goal: *Absence of Falls Document Jersey Snell Fall Risk and appropriate interventions in the flowsheet. Outcome: Progressing Towards Goal 
Fall Risk Interventions: 
  
 
  
 
Medication Interventions: Evaluate medications/consider consulting pharmacy, Teach patient to arise slowly

## 2018-09-19 NOTE — PERIOP NOTES
TRANSFER - OUT REPORT: 
 
Verbal report given to Rios Wilson RN(name) on Goldy San  being transferred to [address] [place] [address](unit) for routine progression of care Report consisted of patients Situation, Background, Assessment and  
Recommendations(SBAR). Information from the following report(s) SBAR, Kardex, OR Summary, Procedure Summary and Recent Results was reviewed with the receiving nurse. Lines:  
Peripheral IV 09/18/18 Right Antecubital (Active) Site Assessment Clean, dry, & intact 9/19/2018  5:30 PM  
Phlebitis Assessment 0 9/19/2018  5:30 PM  
Infiltration Assessment 0 9/19/2018  5:30 PM  
Dressing Status Clean, dry, & intact 9/19/2018  5:30 PM  
Dressing Type Tape;Transparent 9/19/2018  5:30 PM  
Hub Color/Line Status Pink; Infusing 9/19/2018  5:30 PM  
Action Taken Open ports on tubing capped 9/18/2018  9:13 AM  
Alcohol Cap Used Yes 9/18/2018  9:13 AM  
  
 
Opportunity for questions and clarification was provided. Patient transported with: 
 O2 @ 2 liters Registered Nurse Tech

## 2018-09-19 NOTE — PROGRESS NOTES
Hospitalist Progress Note Patient: Aurora Root MRN: [id number]  CSN: [id number] YOB: 1955  Age: 70 y.o. Sex: female DOA: 9/17/2018 LOS:  LOS: 1 day Assessment/Plan Patient Active Problem List  
Diagnosis Code  Choledocholithiasis K80.50  Hypomagnesemia E83.42  
 UTI (urinary tract infection) N39.0  Hypertension I10  
  
 
69 yo female admitted for abdominal pain Choledocholithiasis - US showed Cholelithiasis with mild gallbladder wall thickening.   
Dilated common bile duct up to 12 mm and dilated intrahepatic biliary ducts. Consider choledocholithiasis. on ceftriaxone, cipro added General surgery consulted by ER 
GI following, IVF Pain control. For MRCP and possible ERCP with stone extraction today 
  
HTN - continue home medications.  
  
UA not convincing of UTI, however she is already getting ceftriaxone for above.  
  
Hypomagnesemia - replaced.   
  
DVT prophylaxis Disposition : 1-2 days Review of systems General: No fevers or chills. Cardiovascular: No chest pain or pressure. No palpitations. Pulmonary: No shortness of breath. Gastrointestinal: No nausea, vomiting. Physical Exam: 
General: Awake, cooperative, no acute distress   
HEENT: NC, Atraumatic. PERRLA, anicteric sclerae. Lungs: CTA Bilaterally. No Wheezing/Rhonchi/Rales. Heart:  Regular  rhythm,  No murmur, No Rubs, No Gallops Abdomen: Soft, Non distended, Non tender.  +Bowel sounds, Extremities: No c/c/e Psych:   Not anxious or agitated. Neurologic:  No acute neurological deficit. Vital signs/Intake and Output: 
Visit Vitals  /64 (BP 1 Location: Left arm, BP Patient Position: At rest)  Pulse 64  Temp 98.8 °F (37.1 °C)  Resp 18  Ht 4' 11\" (1.499 m)  Wt 66.9 kg (147 lb 6.4 oz)  SpO2 100%  BMI 29.77 kg/m2 Current Shift:    
Last three shifts:  09/18 0701 - 09/19 1900 In: 4377.1 [I.V.:4377.1] Out: 1075 [SNOKA:7807] Labs: Results:  
   
Chemistry Recent Labs  
   09/19/18 
 0500  09/18/18 
 0000 GLU  109*  203* NA  143  140  
K  3.4*  3.4*  
CL  107  100 CO2  24  24 BUN  13  20* CREA  0.98  1.20 CA  8.8  9.5 AGAP  12  16 BUCR  13  17 AP  207*  271* TP  7.1  8.5* ALB  3.0*  4.0  
GLOB  4.1*  4.5* AGRAT  0.7*  0.9  
  
CBC w/Diff Recent Labs  
   09/19/18 
 0500  09/18/18 
 0000 WBC  12.7  17.4*  
RBC  4.74  4.99  
HGB  12.4  13.3 HCT  37.3  38.7 PLT  250  323 GRANS  80*  95* LYMPH  12*  3* EOS  1  0 Cardiac Enzymes No results for input(s): CPK, CKND1, ASHELY in the last 72 hours. No lab exists for component: Carlito Adkins Coagulation No results for input(s): PTP, INR, APTT in the last 72 hours. No lab exists for component: INREXT Lipid Panel Lab Results Component Value Date/Time Cholesterol, total 193 09/19/2018 05:00 AM  
 HDL Cholesterol 14 (L) 09/19/2018 05:00 AM  
 LDL, calculated 138.8 (H) 09/19/2018 05:00 AM  
 VLDL, calculated 40.2 09/19/2018 05:00 AM  
 Triglyceride 201 (H) 09/19/2018 05:00 AM  
 CHOL/HDL Ratio 13.8 (H) 09/19/2018 05:00 AM  
  
BNP No results for input(s): BNPP in the last 72 hours. Liver Enzymes Recent Labs  
   09/19/18 
 0500 TP  7.1 ALB  3.0* AP  207* SGOT  57* Thyroid Studies No results found for: T4, T3U, TSH, TSHEXT Procedures/imaging: see electronic medical records for all procedures/Xrays and details which were not copied into this note but were reviewed prior to creation of Plan

## 2018-09-19 NOTE — PROGRESS NOTES
Surgery called ready for pt. Informed the OR staff that pt has not received the MRI, that Dr. Mariluz Singh wanted done first. MRI was called and will send for the pt now.

## 2018-09-19 NOTE — PROCEDURES
[facility] [place]   ERCP PROCEDURE NOTE  __________________________________________________________________________________________________________________________________________________      INDICATION: 71 YO Female presented with severe epigastric pain, nausea, vomiting and shaking chills caused by obstructive cholangitis from CBD stone. She was found to have dilated intra and extrahepatic bilary tree dilatation up to 12 mm caused by a choledocholithiasis and also had signs of chronic lithiasis cholecystitis. Bilirubin went up to 5.9 with sharp elevation of the LFT. Blood cultures AEROBIC AND ANAEROBIC BOTTLES GRAM NEGATIVE RODS. Patient has HTN. PROCEDURE DETAILS  Under endotracheal sedation and general anesthesia the patient was postionned in the left semi prone position. The side viewing duodenoscope is introduced through the mouth and advance to the 3 ed portion of the duodenum. The esophagus is remarkable for 2 cm hiatal hernia. Small or tiny bernadine high tear. The stomach is normal. Pylorus, lesser curvature, body and fundus are normal. The Bulb, second, third portion of the duodenum and the major papilla are all normal.  There is considerable peristalsis. Had to give glucagon 2 mg IV in total.The opening of the major papilla is small . With the help of the 025 wire guided Olympus taper papillotome I obtained relatively easy selective and deep cannulation of the CBD dilated CBD and less the Intrahepatic branches. Multiple large stones present in the distal CBD. All removed with significant sludge with the help of the wire guided Tetra basket after doing a 7 mm Sphincterotomy with blended current Endocut mode level 1 and then 2. The cystic duct is tortuous and partially visualized but not the gallbladder.      Specimen : Bile is aspirated from the CBD and sent for culture    ESBL is Nil    No immediate complication    IMPRESSION: Multiple large choledocholithiasis removed with basket after doing 7 mm papillotomy. Gallbladder is excluded. Small hiatal hernia  RECOMMENDATION:  He would be able to start taking clear liquid diet tomorrow only if no abdominal pain and increase rapidly if well tolerated and no pancreatitis. No NSAID's  Expect jaundice and liver enzymes to improve toward normal soon.   Would need to have cholecystectomy done soon      No Assistant      Chuy Garrison M.D.

## 2018-09-19 NOTE — PERIOP NOTES
Patient speaks Barbadian only. Offered . Patient declines at this time. Has two friends in room that are able to translate.

## 2018-09-19 NOTE — PERIOP NOTES
Bedside report to receiving nurse Yancy Ramon RN, current vital signs noted below. Dressing dry and intact. Family in patient room upon transfer. No further questions from receiving nurse. Visit Vitals  /64 (BP 1 Location: Left arm, BP Patient Position: At rest)  Pulse 64  Temp 98.6 °F (37 °C)  Resp 16  
 Ht 4' 11\" (1.499 m)  Wt 66.9 kg (147 lb 6.4 oz)  SpO2 100%  BMI 29.77 kg/m2 Harsh Byrne RN

## 2018-09-19 NOTE — PERIOP NOTES
TRANSFER - IN REPORT: 
 
Verbal report received from ORN and Dr. Kenya Block) on Nicol Torres  being received from OR(unit) for routine progression of care Report consisted of patients Situation, Background, Assessment and  
Recommendations(SBAR). Information from the following report(s) SBAR, Kardex, OR Summary, Procedure Summary, MAR and Recent Results was reviewed with the receiving nurse. Opportunity for questions and clarification was provided. Assessment completed upon patients arrival to unit and care assumed.

## 2018-09-19 NOTE — ROUTINE PROCESS
Bedside shift change report given to Alvarez Alva RN (oncoming nurse) by Julieta Antony RN (offgoing nurse). Report included the following information SBAR, Kardex, MAR, Recent Results and Alarm Parameters .

## 2018-09-20 LAB
ALBUMIN SERPL-MCNC: 2.6 G/DL (ref 3.4–5)
ALBUMIN/GLOB SERPL: 0.7 {RATIO} (ref 0.8–1.7)
ALP SERPL-CCNC: 184 U/L (ref 45–117)
ALT SERPL-CCNC: 122 U/L (ref 13–56)
ANION GAP SERPL CALC-SCNC: 11 MMOL/L (ref 3–18)
AST SERPL-CCNC: 26 U/L (ref 15–37)
BACTERIA SPEC CULT: ABNORMAL
BACTERIA SPEC CULT: ABNORMAL
BASOPHILS # BLD: 0 K/UL (ref 0–0.1)
BASOPHILS NFR BLD: 0 % (ref 0–2)
BILIRUB SERPL-MCNC: 1.8 MG/DL (ref 0.2–1)
BUN SERPL-MCNC: 13 MG/DL (ref 7–18)
BUN/CREAT SERPL: 14 (ref 12–20)
CALCIUM SERPL-MCNC: 8.3 MG/DL (ref 8.5–10.1)
CHLORIDE SERPL-SCNC: 107 MMOL/L (ref 100–108)
CO2 SERPL-SCNC: 24 MMOL/L (ref 21–32)
CREAT SERPL-MCNC: 0.94 MG/DL (ref 0.6–1.3)
DIFFERENTIAL METHOD BLD: ABNORMAL
EOSINOPHIL # BLD: 0.2 K/UL (ref 0–0.4)
EOSINOPHIL NFR BLD: 2 % (ref 0–5)
ERYTHROCYTE [DISTWIDTH] IN BLOOD BY AUTOMATED COUNT: 14.2 % (ref 11.6–14.5)
GLOBULIN SER CALC-MCNC: 3.9 G/DL (ref 2–4)
GLUCOSE SERPL-MCNC: 88 MG/DL (ref 74–99)
GRAM STN SPEC: ABNORMAL
HCT VFR BLD AUTO: 33.1 % (ref 35–45)
HGB BLD-MCNC: 11.1 G/DL (ref 12–16)
LIPASE SERPL-CCNC: 188 U/L (ref 73–393)
LYMPHOCYTES # BLD: 1.8 K/UL (ref 0.9–3.6)
LYMPHOCYTES NFR BLD: 19 % (ref 21–52)
MCH RBC QN AUTO: 26.2 PG (ref 24–34)
MCHC RBC AUTO-ENTMCNC: 33.5 G/DL (ref 31–37)
MCV RBC AUTO: 78.1 FL (ref 74–97)
MONOCYTES # BLD: 0.6 K/UL (ref 0.05–1.2)
MONOCYTES NFR BLD: 7 % (ref 3–10)
NEUTS SEG # BLD: 6.8 K/UL (ref 1.8–8)
NEUTS SEG NFR BLD: 72 % (ref 40–73)
PLATELET # BLD AUTO: 243 K/UL (ref 135–420)
PMV BLD AUTO: 9.5 FL (ref 9.2–11.8)
POTASSIUM SERPL-SCNC: 3.3 MMOL/L (ref 3.5–5.5)
PROT SERPL-MCNC: 6.5 G/DL (ref 6.4–8.2)
RBC # BLD AUTO: 4.24 M/UL (ref 4.2–5.3)
SERVICE CMNT-IMP: ABNORMAL
SERVICE CMNT-IMP: ABNORMAL
SODIUM SERPL-SCNC: 142 MMOL/L (ref 136–145)
WBC # BLD AUTO: 9.4 K/UL (ref 4.6–13.2)

## 2018-09-20 PROCEDURE — 77010033678 HC OXYGEN DAILY

## 2018-09-20 PROCEDURE — 85025 COMPLETE CBC W/AUTO DIFF WBC: CPT | Performed by: INTERNAL MEDICINE

## 2018-09-20 PROCEDURE — 65270000029 HC RM PRIVATE

## 2018-09-20 PROCEDURE — 36415 COLL VENOUS BLD VENIPUNCTURE: CPT | Performed by: INTERNAL MEDICINE

## 2018-09-20 PROCEDURE — 80053 COMPREHEN METABOLIC PANEL: CPT | Performed by: INTERNAL MEDICINE

## 2018-09-20 PROCEDURE — 77030027138 HC INCENT SPIROMETER -A

## 2018-09-20 PROCEDURE — 74011250636 HC RX REV CODE- 250/636: Performed by: INTERNAL MEDICINE

## 2018-09-20 PROCEDURE — 74011250636 HC RX REV CODE- 250/636: Performed by: HOSPITALIST

## 2018-09-20 PROCEDURE — 74011000250 HC RX REV CODE- 250: Performed by: HOSPITALIST

## 2018-09-20 PROCEDURE — 74011250637 HC RX REV CODE- 250/637: Performed by: HOSPITALIST

## 2018-09-20 PROCEDURE — 83690 ASSAY OF LIPASE: CPT | Performed by: INTERNAL MEDICINE

## 2018-09-20 PROCEDURE — C9113 INJ PANTOPRAZOLE SODIUM, VIA: HCPCS | Performed by: HOSPITALIST

## 2018-09-20 RX ORDER — AMLODIPINE BESYLATE 5 MG/1
10 TABLET ORAL DAILY
Status: DISCONTINUED | OUTPATIENT
Start: 2018-09-21 | End: 2018-09-23 | Stop reason: HOSPADM

## 2018-09-20 RX ORDER — POTASSIUM CHLORIDE 20 MEQ/1
40 TABLET, EXTENDED RELEASE ORAL
Status: COMPLETED | OUTPATIENT
Start: 2018-09-20 | End: 2018-09-20

## 2018-09-20 RX ORDER — AMLODIPINE BESYLATE 5 MG/1
5 TABLET ORAL ONCE
Status: COMPLETED | OUTPATIENT
Start: 2018-09-20 | End: 2018-09-20

## 2018-09-20 RX ADMIN — SODIUM CHLORIDE 40 MG: 9 INJECTION INTRAMUSCULAR; INTRAVENOUS; SUBCUTANEOUS at 08:37

## 2018-09-20 RX ADMIN — WATER 2 G: 1 INJECTION INTRAMUSCULAR; INTRAVENOUS; SUBCUTANEOUS at 16:21

## 2018-09-20 RX ADMIN — HEPARIN SODIUM 5000 UNITS: 5000 INJECTION INTRAVENOUS; SUBCUTANEOUS at 00:57

## 2018-09-20 RX ADMIN — SODIUM CHLORIDE 125 ML/HR: 900 INJECTION, SOLUTION INTRAVENOUS at 15:18

## 2018-09-20 RX ADMIN — Medication 10 ML: at 16:22

## 2018-09-20 RX ADMIN — CIPROFLOXACIN 400 MG: 2 INJECTION, SOLUTION INTRAVENOUS at 16:19

## 2018-09-20 RX ADMIN — HEPARIN SODIUM 5000 UNITS: 5000 INJECTION INTRAVENOUS; SUBCUTANEOUS at 16:20

## 2018-09-20 RX ADMIN — SODIUM CHLORIDE 125 ML/HR: 900 INJECTION, SOLUTION INTRAVENOUS at 21:48

## 2018-09-20 RX ADMIN — AMLODIPINE BESYLATE 5 MG: 5 TABLET ORAL at 08:36

## 2018-09-20 RX ADMIN — CIPROFLOXACIN 400 MG: 2 INJECTION, SOLUTION INTRAVENOUS at 06:02

## 2018-09-20 RX ADMIN — ATENOLOL 50 MG: 50 TABLET ORAL at 21:48

## 2018-09-20 RX ADMIN — POTASSIUM CHLORIDE 40 MEQ: 20 TABLET, EXTENDED RELEASE ORAL at 12:57

## 2018-09-20 RX ADMIN — ATENOLOL 50 MG: 50 TABLET ORAL at 08:35

## 2018-09-20 RX ADMIN — AMLODIPINE BESYLATE 5 MG: 5 TABLET ORAL at 16:20

## 2018-09-20 RX ADMIN — HEPARIN SODIUM 5000 UNITS: 5000 INJECTION INTRAVENOUS; SUBCUTANEOUS at 08:37

## 2018-09-20 RX ADMIN — HEPARIN SODIUM 5000 UNITS: 5000 INJECTION INTRAVENOUS; SUBCUTANEOUS at 23:31

## 2018-09-20 NOTE — PROGRESS NOTES
Shift summary: Pt is A/O x4, Wolof speaking with family assisting with care. Denied pain. NPO for shift and on clear liquids this am after pt denied pain throughout shift as per Dr. rAiel Wagoner order. Guerrero cath in for strict I&O. Up with assist by family member. IS provided and teaching done. Shift uneventful

## 2018-09-20 NOTE — PROGRESS NOTES
Hospitalist Progress Note Patient: Mariama Torres MRN: [id number]  CSN: [id number] YOB: 1955  Age: 70 y.o. Sex: female DOA: 9/17/2018 LOS:  LOS: 2 days Assessment/Plan Patient Active Problem List  
Diagnosis Code  Choledocholithiasis K80.50  Hypomagnesemia E83.42  
 UTI (urinary tract infection) N39.0  Hypertension I10  
  
 
69 yo female admitted for abdominal pain Choledocholithiasis - US showed Cholelithiasis with mild gallbladder wall thickening.   
Dilated common bile duct up to 12 mm and dilated intrahepatic biliary ducts. Consider choledocholithiasis. on ceftriaxone, cipro added General surgery consulted by ER 
GI following, IVF Pain control. s/p  ERCP Multiple large choledocholithiasis removed with basket after doing 7 mm papillotomy. 
  
HTN - uncontrolled, continue home medications. Increased norvasc 
  
UA not convincing of UTI, however she is already getting ceftriaxone for above.  
  
Hypomagnesemia - replaced.   
  
DVT prophylaxis Disposition : 1-2 days Review of systems General: No fevers or chills. Cardiovascular: No chest pain or pressure. No palpitations. Pulmonary: No shortness of breath. Gastrointestinal: No nausea, vomiting. Physical Exam: 
General: Awake, cooperative, no acute distress   
HEENT: NC, Atraumatic. PERRLA, anicteric sclerae. Lungs: CTA Bilaterally. No Wheezing/Rhonchi/Rales. Heart:  Regular  rhythm,  No murmur, No Rubs, No Gallops Abdomen: Soft, Non distended, Non tender.  +Bowel sounds, Extremities: No c/c/e Psych:   Not anxious or agitated. Neurologic:  No acute neurological deficit. Vital signs/Intake and Output: 
Visit Vitals  /80 (BP 1 Location: Left arm)  Pulse 73  Temp 97.6 °F (36.4 °C)  Resp 18  Ht 4' 11\" (1.499 m)  Wt 66.9 kg (147 lb 6.4 oz)  SpO2 98%  BMI 29.77 kg/m2 Current Shift:  09/20 0701 - 09/20 1900 In: 240 [P.O.:240] Out: 1000 [Urine:1000] Last three shifts:  09/18 1901 - 09/20 0700 In: 4397.9 [I.V.:4397.9] Out: 1825 [[facility]:1867] Labs: Results:  
   
Chemistry Recent Labs  
   09/20/18 
 0512  09/19/18 
 0500  09/18/18 
 0000 GLU  88  109*  203* NA  142  143  140  
K  3.3*  3.4*  3.4*  
CL  107  107  100 CO2  24  24  24 BUN  13  13  20* CREA  0.94  0.98  1.20 CA  8.3*  8.8  9.5 AGAP  11  12  16 BUCR  14  13  17 AP  184*  207*  271* TP  6.5  7.1  8.5* ALB  2.6*  3.0*  4.0  
GLOB  3.9  4.1*  4.5* AGRAT  0.7*  0.7*  0.9  
  
CBC w/Diff Recent Labs  
   09/20/18 
 0512  09/19/18 
 0500  09/18/18 
 0000 WBC  9.4  12.7  17.4*  
RBC  4.24  4.74  4.99  
HGB  11.1*  12.4  13.3 HCT  33.1*  37.3  38.7 PLT  243  250  323 GRANS  72  80*  95* LYMPH  19*  12*  3*  
EOS  2  1  0 Cardiac Enzymes No results for input(s): CPK, CKND1, ASHELY in the last 72 hours. No lab exists for component: [place] Sharif Coagulation No results for input(s): PTP, INR, APTT in the last 72 hours. No lab exists for component: INREXT, INREXT Lipid Panel Lab Results Component Value Date/Time Cholesterol, total 193 09/19/2018 05:00 AM  
 HDL Cholesterol 14 (L) 09/19/2018 05:00 AM  
 LDL, calculated 138.8 (H) 09/19/2018 05:00 AM  
 VLDL, calculated 40.2 09/19/2018 05:00 AM  
 Triglyceride 201 (H) 09/19/2018 05:00 AM  
 CHOL/HDL Ratio 13.8 (H) 09/19/2018 05:00 AM  
  
BNP No results for input(s): BNPP in the last 72 hours. Liver Enzymes Recent Labs  
   09/20/18 
 6573 TP  6.5 ALB  2.6* AP  184* SGOT  26 Thyroid Studies No results found for: T4, T3U, TSH, TSHEXT, TSHEXT Procedures/imaging: see electronic medical records for all procedures/Xrays and details which were not copied into this note but were reviewed prior to creation of Plan

## 2018-09-20 NOTE — PROGRESS NOTES
Pt arrived to the unit from PACU. Pt alert and oriented X4. Family is at the bedside. Guerrero patent. Bed is low with the wheels locked. Call bell is within reach. Nursing to continue to monitor.

## 2018-09-20 NOTE — PROGRESS NOTES
0930hrs Patient alert and oriented x4, vital signs /80 manual T:97.6 P73, o2 100% at RA, R:18, denies pain at this time. 0940hrs Dr Yvrose Mead was paged to report vital signs and Potassium level 3.3, awaiting on call back. 1005hrs Dr Yvrose Mead called, no orders given, he is aware of vital signs. 1300hrs IV catheter removed, patient pulled it out accidentally. Tip intact,no bleeding, pain,signs edema, infiltration. 1527hrs Patient VS: BP:186/79 P:77 Dr Irene Zepeda awaiting on orders. 1600hrs Bladder training prior to D/C herr per MD order. 1800hrs Patient verbalized to have feeling of urinating, herr discontinued at this time. 2020hrs Bedside, Verbal and Written shift change report given to Carmelo Anguiano RN (oncoming nurse) by Ashvin Tracey RN (offgoing nurse). Report included the following information SBAR, Kardex, Procedure Summary, Intake/Output, MAR, Accordion, Recent Results and Med Rec Status. All questions answered, family at bedside.

## 2018-09-20 NOTE — PROGRESS NOTES
Pt is not medically stable at this time to transition home. Anticipate pt will be medically stable within the next 24-48 hours. Please encourage ambulation to assist with identifying potential transition of care needs. CM continuing to follow. Care Management Interventions Mode of Transport at Discharge: Other (see comment) (Family) Transition of Care Consult (CM Consult): Discharge Planning Health Maintenance Reviewed: Yes Current Support Network: [place], Family Lives [place] Confirm Follow Up Transport: Family Plan discussed with Pt/Family/Caregiver: Yes Discharge Location Discharge Placement: Home with family assistance

## 2018-09-20 NOTE — ROUTINE PROCESS
Bedside and Verbal shift change report given to ADITI Hurtado (oncoming nurse) by Mumtaz Trevino RN 
 (offgoing nurse). Report included the following information SBAR, Kardex, Procedure Summary, Intake/Output, MAR and Recent Results.

## 2018-09-21 LAB
ALBUMIN SERPL-MCNC: 3 G/DL (ref 3.4–5)
ALBUMIN/GLOB SERPL: 0.7 {RATIO} (ref 0.8–1.7)
ALP SERPL-CCNC: 226 U/L (ref 45–117)
ALT SERPL-CCNC: 101 U/L (ref 13–56)
ANION GAP SERPL CALC-SCNC: 8 MMOL/L (ref 3–18)
AST SERPL-CCNC: 25 U/L (ref 15–37)
BACTERIA SPEC CULT: ABNORMAL
BACTERIA SPEC CULT: ABNORMAL
BILIRUB SERPL-MCNC: 1.4 MG/DL (ref 0.2–1)
BUN SERPL-MCNC: 7 MG/DL (ref 7–18)
BUN/CREAT SERPL: 7 (ref 12–20)
CALCIUM SERPL-MCNC: 8.9 MG/DL (ref 8.5–10.1)
CHLORIDE SERPL-SCNC: 107 MMOL/L (ref 100–108)
CO2 SERPL-SCNC: 27 MMOL/L (ref 21–32)
CREAT SERPL-MCNC: 0.98 MG/DL (ref 0.6–1.3)
ERYTHROCYTE [DISTWIDTH] IN BLOOD BY AUTOMATED COUNT: 13.9 % (ref 11.6–14.5)
GLOBULIN SER CALC-MCNC: 4.2 G/DL (ref 2–4)
GLUCOSE SERPL-MCNC: 108 MG/DL (ref 74–99)
HCT VFR BLD AUTO: 36.7 % (ref 35–45)
HGB BLD-MCNC: 12.5 G/DL (ref 12–16)
MCH RBC QN AUTO: 26.6 PG (ref 24–34)
MCHC RBC AUTO-ENTMCNC: 34.1 G/DL (ref 31–37)
MCV RBC AUTO: 78.1 FL (ref 74–97)
PLATELET # BLD AUTO: 298 K/UL (ref 135–420)
PMV BLD AUTO: 9.5 FL (ref 9.2–11.8)
POTASSIUM SERPL-SCNC: 3.8 MMOL/L (ref 3.5–5.5)
PROT SERPL-MCNC: 7.2 G/DL (ref 6.4–8.2)
RBC # BLD AUTO: 4.7 M/UL (ref 4.2–5.3)
SERVICE CMNT-IMP: ABNORMAL
SODIUM SERPL-SCNC: 142 MMOL/L (ref 136–145)
WBC # BLD AUTO: 6.8 K/UL (ref 4.6–13.2)

## 2018-09-21 PROCEDURE — 74011250637 HC RX REV CODE- 250/637: Performed by: INTERNAL MEDICINE

## 2018-09-21 PROCEDURE — C9113 INJ PANTOPRAZOLE SODIUM, VIA: HCPCS | Performed by: HOSPITALIST

## 2018-09-21 PROCEDURE — 65270000029 HC RM PRIVATE

## 2018-09-21 PROCEDURE — 85027 COMPLETE CBC AUTOMATED: CPT | Performed by: INTERNAL MEDICINE

## 2018-09-21 PROCEDURE — 74011250636 HC RX REV CODE- 250/636: Performed by: INTERNAL MEDICINE

## 2018-09-21 PROCEDURE — 80053 COMPREHEN METABOLIC PANEL: CPT | Performed by: INTERNAL MEDICINE

## 2018-09-21 PROCEDURE — 36415 COLL VENOUS BLD VENIPUNCTURE: CPT | Performed by: INTERNAL MEDICINE

## 2018-09-21 PROCEDURE — 74011250636 HC RX REV CODE- 250/636: Performed by: HOSPITALIST

## 2018-09-21 PROCEDURE — 74011250637 HC RX REV CODE- 250/637: Performed by: HOSPITALIST

## 2018-09-21 RX ORDER — CIPROFLOXACIN 250 MG/1
500 TABLET, FILM COATED ORAL EVERY 12 HOURS
Status: DISCONTINUED | OUTPATIENT
Start: 2018-09-21 | End: 2018-09-23 | Stop reason: HOSPADM

## 2018-09-21 RX ORDER — LORAZEPAM 0.5 MG/1
0.5 TABLET ORAL
Status: DISCONTINUED | OUTPATIENT
Start: 2018-09-21 | End: 2018-09-23 | Stop reason: HOSPADM

## 2018-09-21 RX ORDER — ATENOLOL 50 MG/1
100 TABLET ORAL 2 TIMES DAILY
Status: DISCONTINUED | OUTPATIENT
Start: 2018-09-21 | End: 2018-09-23 | Stop reason: HOSPADM

## 2018-09-21 RX ORDER — ATENOLOL 50 MG/1
50 TABLET ORAL
Status: COMPLETED | OUTPATIENT
Start: 2018-09-21 | End: 2018-09-21

## 2018-09-21 RX ADMIN — SODIUM CHLORIDE 75 ML/HR: 900 INJECTION, SOLUTION INTRAVENOUS at 22:49

## 2018-09-21 RX ADMIN — CIPROFLOXACIN HYDROCHLORIDE 500 MG: 250 TABLET, FILM COATED ORAL at 18:53

## 2018-09-21 RX ADMIN — HEPARIN SODIUM 5000 UNITS: 5000 INJECTION INTRAVENOUS; SUBCUTANEOUS at 08:11

## 2018-09-21 RX ADMIN — LORAZEPAM 0.5 MG: 0.5 TABLET ORAL at 11:00

## 2018-09-21 RX ADMIN — HEPARIN SODIUM 5000 UNITS: 5000 INJECTION INTRAVENOUS; SUBCUTANEOUS at 16:21

## 2018-09-21 RX ADMIN — SODIUM CHLORIDE 75 ML/HR: 900 INJECTION, SOLUTION INTRAVENOUS at 09:56

## 2018-09-21 RX ADMIN — ATENOLOL 100 MG: 50 TABLET ORAL at 20:39

## 2018-09-21 RX ADMIN — CIPROFLOXACIN 400 MG: 2 INJECTION, SOLUTION INTRAVENOUS at 05:39

## 2018-09-21 RX ADMIN — AMLODIPINE BESYLATE 10 MG: 5 TABLET ORAL at 08:11

## 2018-09-21 RX ADMIN — ATENOLOL 50 MG: 50 TABLET ORAL at 10:09

## 2018-09-21 RX ADMIN — ATENOLOL 50 MG: 50 TABLET ORAL at 08:11

## 2018-09-21 RX ADMIN — SODIUM CHLORIDE 40 MG: 9 INJECTION INTRAMUSCULAR; INTRAVENOUS; SUBCUTANEOUS at 08:11

## 2018-09-21 NOTE — ROUTINE PROCESS
Bedside shift change report given to Karma Posadas RN (oncoming nurse) by Sharonda North RN (offgoing nurse). Report included the following information SBAR, Kardex, Intake/Output and MAR.

## 2018-09-21 NOTE — PROGRESS NOTES
Reviewed clinical information and noted plan discharge tomorrow if surgery is to be done at a later date. Please encourage ambulation in the halls to assist with transition of care. No transition of care needs have been identified at this time. CM continuing to follow. Care Management Interventions Mode of Transport at Discharge: Other (see comment) (Family) Transition of Care Consult (CM Consult): Discharge Planning Health Maintenance Reviewed: Yes Current Support Network: [facility], Family [facility] Confirm Follow Up Transport: Family Plan discussed with Pt/Family/Caregiver: Yes Discharge Location Discharge Placement: Home with family assistance

## 2018-09-21 NOTE — ROUTINE PROCESS
RN informed Dr. Mathew Noguera that pt BP has increased to 200/90, confirmed via manual.  Received VO for additional dose atenolol as pt's dosage increase was previously not scheduled to take effect until evening admin.

## 2018-09-21 NOTE — PROGRESS NOTES
Problem: Falls - Risk of 
Goal: *Absence of Falls Document [facility] Fall Risk and appropriate interventions in the flowsheet. Outcome: Progressing Towards Goal 
Fall Risk Interventions: 
Mobility Interventions: Patient to call before getting OOB, Communicate number of staff needed for ambulation/transfer Medication Interventions: Assess postural VS orthostatic hypotension, Evaluate medications/consider consulting pharmacy, Patient to call before getting OOB Elimination Interventions: Call light in reach, Patient to call for help with toileting needs, Toilet paper/wipes in reach, Toileting schedule/hourly rounds

## 2018-09-21 NOTE — PROGRESS NOTES
Hospitalist Progress Note Patient: Makenna Keys MRN: [id number]  CSN: [id number] YOB: 1955  Age: 70 y.o. Sex: female DOA: 9/17/2018 LOS:  LOS: 3 days Assessment/Plan 1. CHoledocholithiasis sp ERCP w sphincterotomy 2. Ecoli bactermia, repeat cultures ngtd 3. Cholangitis 4. UTI 5. HTN uncontrolled Plan: 
- decrease fluids 
- increase atenolol, continue norvasc 
- general surgery consulted 
- change IV ceftriaxone to oral cipro, discussed w ID 
- likely DC tomorrow 
- advance diet if no plan for surgical intervention Patient Active Problem List  
Diagnosis Code  Choledocholithiasis K80.50  Hypomagnesemia E83.42  
 UTI (urinary tract infection) N39.0  Hypertension I10 Subjective:  
 cc: \" Im oK\" No acute events overnight Sp ERCP, toelrated procedure Tolerating clears Daughter translates REVIEW OF SYSTEMS: 
General: No fevers or chills. Cardiovascular: No chest pain or pressure. No palpitations. Pulmonary: No shortness of breath. Gastrointestinal: No nausea, vomiting. Objective:  
  
 
Vital signs/Intake and Output: 
Visit Vitals  BP (!) 177/93  Pulse 84  Temp 98.1 °F (36.7 °C)  Resp 18  Ht 4' 11\" (1.499 m)  Wt 66.9 kg (147 lb 6.4 oz)  SpO2 100%  BMI 29.77 kg/m2 Current Shift:    
Last three shifts:  09/19 1901 - 09/21 0700 In: 3454 [P.O.:720; I.V.:2950] Out: 2750 [Urine:2750] Body mass index is 29.77 kg/(m^2). Physical Exam: 
GEN: Alert and oriented times three NAD 
EYES: conjunctiva normal, lids with out lesions HEENT: MMM, No thyromegaly, no lymphadenopathy HEART: RRR +S1 +S2, no JVD, pulses 2+ distally LUNGS: CTA B/L no wheezes, rales or rhonchi ABDOMEN: + BS, soft NT/ND no organomegaly,  No rebound EXTREMITIES: No edema cyanosis, cap refill normal 
 SKIN: no rashes or skin breakdown, no nodules, normal turgor Current Facility-Administered Medications Medication Dose Route Frequency  atenolol (TENORMIN) tablet 100 mg  100 mg Oral BID  influenza vaccine 2018-19 (6 mos+)(PF) (FLUARIX QUAD/FLULAVAL QUAD) injection 0.5 mL  0.5 mL IntraMUSCular PRIOR TO DISCHARGE  amLODIPine (NORVASC) tablet 10 mg  10 mg Oral DAILY  sodium chloride (NS) flush 5-10 mL  5-10 mL IntraVENous PRN  
 fentaNYL citrate (PF) injection 25 mcg  25 mcg IntraVENous PRN  
 fentaNYL citrate (PF) injection 50 mcg  50 mcg IntraVENous Multiple  naloxone (NARCAN) injection 0.1 mg  0.1 mg IntraVENous PRN  
 glucose chewable tablet 16 g  4 Tab Oral PRN  
 glucagon (GLUCAGEN) injection 1 mg  1 mg IntraMUSCular PRN  
 dextrose (D50W) injection syrg 12.5-25 g  25-50 mL IntraVENous PRN  
 sodium chloride (NS) flush 5-10 mL  5-10 mL IntraVENous PRN  
 HYDROmorphone (PF) (DILAUDID) injection 0.5 mg  0.5 mg IntraVENous Multiple  naloxone (NARCAN) injection 0.1 mg  0.1 mg IntraVENous PRN  
 flumazenil (ROMAZICON) 0.1 mg/mL injection 0.2 mg  0.2 mg IntraVENous Multiple  glucose chewable tablet 16 g  4 Tab Oral PRN  
 glucagon (GLUCAGEN) injection 1 mg  1 mg IntraMUSCular PRN  
 dextrose (D50W) injection syrg 12.5-25 g  25-50 mL IntraVENous PRN  
 sodium chloride (NS) flush 5-10 mL  5-10 mL IntraVENous Q8H  
 sodium chloride (NS) flush 5-10 mL  5-10 mL IntraVENous PRN  
 0.9% sodium chloride infusion  75 mL/hr IntraVENous CONTINUOUS  
 cefTRIAXone (ROCEPHIN) 2 g in sterile water (preservative free) 20 mL IV syringe  2 g IntraVENous Q24H  
 heparin (porcine) injection 5,000 Units  5,000 Units SubCUTAneous Q8H  
 pantoprazole (PROTONIX) 40 mg in sodium chloride 0.9% 10 mL injection  40 mg IntraVENous DAILY  morphine injection 2 mg  2 mg IntraVENous Q4H PRN  
 ciprofloxacin (CIPRO) 400 mg IVPB (premix)  400 mg IntraVENous Q12H Facility-Administered Medications Ordered in Other Encounters Medication Dose Route Frequency  esmolol (BREVIBLOC) 100 mg/10 mL (10 mg/mL) injection   IntraVENous PRN  
 fentaNYL citrate (PF) injection   IntraVENous PRN  
 midazolam (VERSED) injection   IntraVENous PRN  
 lidocaine (PF) (XYLOCAINE) 20 mg/mL (2 %) injection   IntraVENous PRN  propofol (DIPRIVAN) 10 mg/mL injection   IntraVENous PRN  
 rocuronium (ZEMURON) injection   IntraVENous PRN  
 PHENYLephrine (KELSEY-SYNEPHRINE) 10,000 mcg in 0.9% sodium chloride 100 mL infusion  10,000 mcg IntraVENous CONTINUOUS  
 glucagon (GLUCAGEN) injection   IntraVENous PRN  
 ondansetron (ZOFRAN) injection    PRN  
 lactated Ringers infusion   IntraVENous CONTINUOUS  
 glycopyrrolate (ROBINUL) injection   IntraVENous PRN  
 neostigmine methylsulfate (PROSTIGMINE) 1 mg/mL syringe   IntraVENous PRN  
 ketorolac (TORADOL) injection   IntraVENous PRN All the patient's labs over the past 24 hours were reviewed both during my initial daily workflow process and at the time notated as \"note time\" in [address] [facility]. (It is not time stamped separately in this workflow.)  Select labs are listed below. Labs: Results:  
   
Chemistry Recent Labs  
   09/21/18 0518 09/20/18 
 0512 09/19/18 
 0500 GLU  108*  88  109* NA  142  142  143  
K  3.8  3.3*  3.4*  
CL  107  107  107 CO2  27  24  24 BUN  7  13  13 CREA  0.98  0.94  0.98  
CA  8.9  8.3*  8.8 AGAP  8  11  12 BUCR  7*  14  13 AP  226*  184*  207* TP  7.2  6.5  7.1 ALB  3.0*  2.6*  3.0*  
GLOB  4.2*  3.9  4.1* AGRAT  0.7*  0.7*  0.7* CBC w/Diff Recent Labs  
   09/21/18 0518 09/20/18 
 0512 09/19/18 
 0500 WBC  6.8  9.4  12.7 RBC  4.70  4.24  4.74 HGB  12.5  11.1*  12.4 HCT  36.7  33.1*  37.3 PLT  298  243  250 GRANS   --   72  80* LYMPH   --   19*  12* EOS   --   2  1 Lipid Panel Lab Results Component Value Date/Time  Cholesterol, total 193 09/19/2018 05:00 AM  
 HDL Cholesterol 14 (L) 09/19/2018 05:00 AM  
 LDL, calculated 138.8 (H) 09/19/2018 05:00 AM  
 VLDL, calculated 40.2 09/19/2018 05:00 AM  
 Triglyceride 201 (H) 09/19/2018 05:00 AM  
 CHOL/HDL Ratio 13.8 (H) 09/19/2018 05:00 AM  
  
   
Liver Enzymes Recent Labs  
   09/21/18 
 0518 TP  7.2 ALB  3.0*  
AP  226* SGOT  25 Procedures/imaging: see electronic medical records for all procedures/Xrays and details which were not copied into this note but were reviewed prior to creation of  
 
 
Francisca Loco DO Internal Medicine/Geriatrics

## 2018-09-21 NOTE — ROUTINE PROCESS
Bedside and Verbal shift change report given to Cammy Joseph RN (oncoming nurse) by Clelia Bernheim, RN 
 (offgoing nurse). Report included the following information SBAR, Kardex, Intake/Output, MAR and Recent Results.

## 2018-09-21 NOTE — PROGRESS NOTES
Shift summary: Pt is A/O x4, Serbian speaking with family members to interpret for her. Pt refused  since admission. Up with supervision, voiding several times in bathroom, also reported having a BM once for shift. On Cipro and Rocephin for UTI. ERCP done with removal of gallstones done on 9/18. On full liquid diet, tolerating liquids. Denied pain

## 2018-09-21 NOTE — ROUTINE PROCESS
Bedside and Verbal shift change report given to Abdulaziz Booker RN (oncoming nurse) by Kenia Henson RN (offgoing nurse). Report included the following information SBAR, Kardex, Intake/Output and MAR.

## 2018-09-21 NOTE — PROGRESS NOTES
Alert and oriented X4 female patient. Pleasant, calm. Speaks Welsh only, daughter by the bedside to translate. Patient has UTI present and taking Ciprofloxacin. BP elevated, given atenolol. Will monitor BP more often. IV Cipro and Rocephin d/c. Dr. Audra Ward ordered oral Cipro. Atenolol increased to 100 mg by physician. Patient suffering from anxiety due to hospital stress. Dr. Audra Ward ordered 0.5 mg PO Ativan. Visit Vitals  /78 (BP 1 Location: Right arm, BP Patient Position: At rest)  Pulse 80  Temp 98.1 °F (36.7 °C)  Resp 18  Ht 4' 11\" (1.499 m)  Wt 66.9 kg (147 lb 6.4 oz)  SpO2 100%  BMI 29.77 kg/m2

## 2018-09-22 LAB
ALBUMIN SERPL-MCNC: 3.1 G/DL (ref 3.4–5)
ALBUMIN/GLOB SERPL: 0.8 {RATIO} (ref 0.8–1.7)
ALP SERPL-CCNC: 238 U/L (ref 45–117)
ALT SERPL-CCNC: 92 U/L (ref 13–56)
AST SERPL-CCNC: 35 U/L (ref 15–37)
BILIRUB DIRECT SERPL-MCNC: 0.6 MG/DL (ref 0–0.2)
BILIRUB SERPL-MCNC: 1.1 MG/DL (ref 0.2–1)
GLOBULIN SER CALC-MCNC: 4 G/DL (ref 2–4)
PROT SERPL-MCNC: 7.1 G/DL (ref 6.4–8.2)

## 2018-09-22 PROCEDURE — 74011250637 HC RX REV CODE- 250/637: Performed by: HOSPITALIST

## 2018-09-22 PROCEDURE — 74011250636 HC RX REV CODE- 250/636: Performed by: HOSPITALIST

## 2018-09-22 PROCEDURE — 74011250637 HC RX REV CODE- 250/637: Performed by: INTERNAL MEDICINE

## 2018-09-22 PROCEDURE — C9113 INJ PANTOPRAZOLE SODIUM, VIA: HCPCS | Performed by: HOSPITALIST

## 2018-09-22 PROCEDURE — 80076 HEPATIC FUNCTION PANEL: CPT | Performed by: HOSPITALIST

## 2018-09-22 PROCEDURE — 65270000029 HC RM PRIVATE

## 2018-09-22 PROCEDURE — 74011000250 HC RX REV CODE- 250: Performed by: HOSPITALIST

## 2018-09-22 PROCEDURE — 36415 COLL VENOUS BLD VENIPUNCTURE: CPT | Performed by: HOSPITALIST

## 2018-09-22 RX ORDER — LISINOPRIL 5 MG/1
10 TABLET ORAL DAILY
Status: DISCONTINUED | OUTPATIENT
Start: 2018-09-22 | End: 2018-09-22 | Stop reason: DRUGHIGH

## 2018-09-22 RX ORDER — HYDRALAZINE HYDROCHLORIDE 25 MG/1
25 TABLET, FILM COATED ORAL 3 TIMES DAILY
Status: DISCONTINUED | OUTPATIENT
Start: 2018-09-23 | End: 2018-09-23 | Stop reason: HOSPADM

## 2018-09-22 RX ORDER — LISINOPRIL 20 MG/1
20 TABLET ORAL DAILY
Status: DISCONTINUED | OUTPATIENT
Start: 2018-09-23 | End: 2018-09-23 | Stop reason: HOSPADM

## 2018-09-22 RX ORDER — LISINOPRIL 20 MG/1
20 TABLET ORAL DAILY
Status: DISCONTINUED | OUTPATIENT
Start: 2018-09-22 | End: 2018-09-22

## 2018-09-22 RX ORDER — LISINOPRIL 5 MG/1
10 TABLET ORAL ONCE
Status: COMPLETED | OUTPATIENT
Start: 2018-09-22 | End: 2018-09-22

## 2018-09-22 RX ADMIN — HEPARIN SODIUM 5000 UNITS: 5000 INJECTION INTRAVENOUS; SUBCUTANEOUS at 08:11

## 2018-09-22 RX ADMIN — ATENOLOL 100 MG: 50 TABLET ORAL at 08:11

## 2018-09-22 RX ADMIN — HEPARIN SODIUM 5000 UNITS: 5000 INJECTION INTRAVENOUS; SUBCUTANEOUS at 16:20

## 2018-09-22 RX ADMIN — Medication 10 ML: at 16:42

## 2018-09-22 RX ADMIN — SODIUM CHLORIDE 40 MG: 9 INJECTION INTRAMUSCULAR; INTRAVENOUS; SUBCUTANEOUS at 08:11

## 2018-09-22 RX ADMIN — HEPARIN SODIUM 5000 UNITS: 5000 INJECTION INTRAVENOUS; SUBCUTANEOUS at 00:15

## 2018-09-22 RX ADMIN — Medication 10 ML: at 21:15

## 2018-09-22 RX ADMIN — AMLODIPINE BESYLATE 10 MG: 5 TABLET ORAL at 08:11

## 2018-09-22 RX ADMIN — ATENOLOL 100 MG: 50 TABLET ORAL at 21:13

## 2018-09-22 RX ADMIN — LISINOPRIL 10 MG: 5 TABLET ORAL at 10:05

## 2018-09-22 RX ADMIN — CIPROFLOXACIN HYDROCHLORIDE 500 MG: 250 TABLET, FILM COATED ORAL at 21:13

## 2018-09-22 RX ADMIN — LISINOPRIL 10 MG: 5 TABLET ORAL at 16:42

## 2018-09-22 RX ADMIN — LORAZEPAM 0.5 MG: 0.5 TABLET ORAL at 08:11

## 2018-09-22 RX ADMIN — CIPROFLOXACIN HYDROCHLORIDE 500 MG: 250 TABLET, FILM COATED ORAL at 08:11

## 2018-09-22 NOTE — CONSULTS
Pt seen and examined. Cholelithiasis w choledocholithiasis, now s/p ERCP with stone extraction. BP improving. D/W hospitalist team -- if bp acceptable overnight, plan for surgery tomorrow. Nature of surgery, alternatives, BAR d/w pt and her daughter.       RP

## 2018-09-22 NOTE — PROGRESS NOTES
Problem: Falls - Risk of 
Goal: *Absence of Falls Document [facility] Fall Risk and appropriate interventions in the flowsheet. Outcome: Progressing Towards Goal 
Fall Risk Interventions: 
Mobility Interventions: Patient to call before getting OOB, Assess mobility with egress test 
 
  
 
Medication Interventions: Evaluate medications/consider consulting pharmacy, Patient to call before getting OOB, Teach patient to arise slowly Elimination Interventions: Call light in reach

## 2018-09-22 NOTE — ROUTINE PROCESS
Spoke to patient through [employer] service (blue phone) in order for patient to sign a declination of interpretor services, while family is in the room. Patient wishes family to interpret, instead of  service, when family members are in patient's room. Patient ok with nurse using [employer] service (blue phone) when family is not present.

## 2018-09-22 NOTE — PROGRESS NOTES
Hospitalist Progress Note Patient: Gallito Fraser MRN: [id number]  CSN: [id number] YOB: 1955  Age: 70 y.o. Sex: female DOA: 9/17/2018 LOS:  LOS: 4 days Assessment and Plan: Active Problems: 
  Choledocholithiasis (9/18/2018) Hypomagnesemia (9/18/2018) UTI (urinary tract infection) (9/18/2018) Hypertension () 1. HTN: will dc fluids and continue atenolol and norvasc and start lisinopril and moniter 2. Choledolithiasis: S/P GI   Surgery was consulted. She may need to have a CCY 3. Now on oral cipro after discussion with ID  
 
 advance diet if no plan for surgical intervention, now on full liquids Chief complaint:  Abdominal pain Subjective: She feels better, no abdominal pain Review of systems: 
 
General: No fevers or chills. Cardiovascular: No chest pain or pressure. No palpitations. Pulmonary: No shortness of breath. Gastrointestinal: No nausea, vomiting. Objective: 
 
Vital signs/Intake and Output: 
Visit Vitals  BP (!) 210/90  Pulse 81  Temp 97.7 °F (36.5 °C)  Resp 18  Ht 4' 11\" (1.499 m)  Wt 66.9 kg (147 lb 6.4 oz)  SpO2 100%  BMI 29.77 kg/m2 Current Shift:    
Last three shifts:  09/20 1901 - 09/22 0700 In: 2926 [P.O.:480; I.V.:2446] Out: - Physical Exam: 
General: NAD, AAOx3. Non-toxic. HEENT: NC/AT. PERRLA, EOMI.  MMM. Lungs: Nml inspection. CTA B/L. No wheezing, rales or rhonchi. Heart:  S1S2 RRR,  PMI mid 5th IC space. No M/RG. Abdomen: Soft, NT/ND.  BS+. No peritoneal signs. Extremities: No C/C/E. Psych:   Nml affect. Neurologic:  2-12 intact. Strength 5/5 throughout. Sensation symmetrical. 
 
 
 
 
Labs: Results:  
   
Chemistry Recent Labs  
   09/22/18 
 0251  09/21/18 
 0518  09/20/18 
 4581 GLU   --   108*  88  
NA   --   142  142 K   --   3.8  3.3*  
CL   --   107  107 CO2   --   27  24 BUN   --   7  13 CREA   --   0.98  0.94  
 CA   --   8.9  8.3* AGAP   --   8  11 BUCR   --   7*  14  
AP  238*  226*  184* TP  7.1  7.2  6.5 ALB  3.1*  3.0*  2.6*  
GLOB  4.0  4.2*  3.9 AGRAT  0.8  0.7*  0.7* CBC w/Diff Recent Labs  
   09/21/18 
 0518  09/20/18 
 3412 WBC  6.8  9.4  
RBC  4.70  4.24 HGB  12.5  11.1*  
HCT  36.7  33.1*  
PLT  298  243 GRANS   --   72  
LYMPH   --   19* EOS   --   2 Cardiac Enzymes No results for input(s): CPK, CKND1, ASHELY in the last 72 hours. No lab exists for component: Ronel Sharper Coagulation No results for input(s): PTP, INR, APTT in the last 72 hours. No lab exists for component: INREXT Lipid Panel Lab Results Component Value Date/Time Cholesterol, total 193 09/19/2018 05:00 AM  
 HDL Cholesterol 14 (L) 09/19/2018 05:00 AM  
 LDL, calculated 138.8 (H) 09/19/2018 05:00 AM  
 VLDL, calculated 40.2 09/19/2018 05:00 AM  
 Triglyceride 201 (H) 09/19/2018 05:00 AM  
 CHOL/HDL Ratio 13.8 (H) 09/19/2018 05:00 AM  
  
BNP No results for input(s): BNPP in the last 72 hours. Liver Enzymes Recent Labs  
   09/22/18 
 0251 TP  7.1 ALB  3.1* AP  238* SGOT  35 Thyroid Studies No results found for: T4, T3U, TSH, TSHEXT Procedures/imaging: see electronic medical records for all procedures/Xrays and details which were not copied into this note but were reviewed prior to creation of Plan

## 2018-09-22 NOTE — PROGRESS NOTES
615 [facility] care of patient at this time. Patient is alert and oriented x 4. Patient denies chest pain, shortness of breath, or numbness or tingling in the upper or lower extremities. Dressing on  is clean, dry and intact. 22g IV in the left forearm is patent and infusing NS @ 75mL/hr. Patient currently experiencing 0/10 pain. Bed is in lowest position with the wheels locked. Siderails x 3 are up. Call bell within reach. Patient is currently resting in bed in no apparent distress. Patient has multiple family members present. Will continue to monitor. 2041 - Head to toe assessment performed at this time. Patient has no complaints of chest pain or shortness of breath. Denies any numbness or tingling in extremities. Lungs are clear to auscultation. Bowel sounds are present and active in all 4 quadrants. 22g IV in the left forearm is patent and infusing with no signs of phlebitis or infiltration. Patient educated how to actively manage their pain. Patient educated on the side effects of medications. Explained the importance of ambulation. Patient verbalized understanding. Patient left in bed with call light within reach, bed in lowest position with wheels locked, and siderails x 3 up. Will continue to monitor. 2250 - Patient in bed resting quietly with multiple family members at the bedside. No needs expressed at this time. Patient's blood pressure remains high (184/85) despite the increase in the dosage of the tenormin. Call bell within reach. Will continue to monitor. 7472 - Patient lying in bed resting quietly. All the family members have departed except for one daughter who is going to remain at the bedside. Patient expresses no needs at this time. Call bell within reach. Will continue to monitor. 0200 - Patient laying in bed with eyes closed, breathing regularly and evenly. Call bell within reach. Will continue to monitor. 0 - Patient laying in bed with eyes closed, breathing regularly and evenly. Call bell within reach. Will continue to monitor. 9642 - Patient laying in bed with eyes closed, breathing regularly and evenly. Call bell within reach. Will continue to monitor.

## 2018-09-22 NOTE — PROGRESS NOTES
0800 am walking round with off shift nurse. Denies  Any pain. B/p ELEVATED . cecy administer schedule meds and address issue with the hospitalist. Call bell with in reach. And family at bedside to interpret. 3727 - see MAR for schedule meds. Manual b/p 210/90/ encouraged to rest til hospitalsit seen and assess pt.  
1005 am- administer lisinopril as order. 1200 resting in bed. No H/A. Awaiting for surgeon to see pt. Encouraged to order lunch. 1400full liquid diet 100 %. No n/v . No abdominal pain. 1515 Bedside and Verbal shift change report given to Rj Sue (oncoming nurse) by Mini Gardner RN 
 (offgoing nurse). Report included the following information SBAR, Intake/Output, MAR, Recent Results and Med Rec Status.

## 2018-09-22 NOTE — ROUTINE PROCESS
Bedside and Verbal shift change report given to Gilbert Diaz RN (oncoming nurse) by Tiffany Strong RN (offgoing nurse). Report included the following information SBAR, Procedure Summary, Intake/Output, MAR and Recent Results.

## 2018-09-23 ENCOUNTER — HOSPITAL ENCOUNTER (EMERGENCY)
Age: 63
Discharge: HOME | End: 2018-09-23
Attending: EMERGENCY MEDICINE
Payer: SELF-PAY

## 2018-09-23 VITALS
DIASTOLIC BLOOD PRESSURE: 80 MMHG | HEIGHT: 59 IN | OXYGEN SATURATION: 100 % | HEART RATE: 73 BPM | TEMPERATURE: 98.2 F | SYSTOLIC BLOOD PRESSURE: 180 MMHG | BODY MASS INDEX: 28.04 KG/M2 | WEIGHT: 139.11 LBS | RESPIRATION RATE: 16 BRPM

## 2018-09-23 VITALS
SYSTOLIC BLOOD PRESSURE: 170 MMHG | DIASTOLIC BLOOD PRESSURE: 83 MMHG | HEART RATE: 74 BPM | TEMPERATURE: 98.1 F | OXYGEN SATURATION: 100 % | RESPIRATION RATE: 18 BRPM | WEIGHT: 138.89 LBS | BODY MASS INDEX: 28 KG/M2 | HEIGHT: 59 IN

## 2018-09-23 DIAGNOSIS — T50.901A MEDICATION OVERDOSE, ACCIDENTAL OR UNINTENTIONAL, INITIAL ENCOUNTER: Primary | ICD-10-CM

## 2018-09-23 LAB
ALBUMIN SERPL-MCNC: 3.2 G/DL (ref 3.4–5)
ALBUMIN/GLOB SERPL: 0.8 {RATIO} (ref 0.8–1.7)
ALP SERPL-CCNC: 230 U/L (ref 45–117)
ALT SERPL-CCNC: 104 U/L (ref 13–56)
ANION GAP SERPL CALC-SCNC: 9 MMOL/L (ref 3–18)
AST SERPL-CCNC: 55 U/L (ref 15–37)
BACTERIA SPEC CULT: ABNORMAL
BASOPHILS # BLD: 0.1 K/UL (ref 0–0.1)
BASOPHILS NFR BLD: 1 % (ref 0–3)
BILIRUB SERPL-MCNC: 1 MG/DL (ref 0.2–1)
BUN SERPL-MCNC: 7 MG/DL (ref 7–18)
BUN/CREAT SERPL: 7 (ref 12–20)
CALCIUM SERPL-MCNC: 8.9 MG/DL (ref 8.5–10.1)
CHLORIDE SERPL-SCNC: 105 MMOL/L (ref 100–108)
CO2 SERPL-SCNC: 28 MMOL/L (ref 21–32)
CREAT SERPL-MCNC: 0.96 MG/DL (ref 0.6–1.3)
DIFFERENTIAL METHOD BLD: ABNORMAL
EOSINOPHIL # BLD: 0.2 K/UL (ref 0–0.4)
EOSINOPHIL NFR BLD: 2 % (ref 0–5)
ERYTHROCYTE [DISTWIDTH] IN BLOOD BY AUTOMATED COUNT: 13.7 % (ref 11.6–14.5)
GLOBULIN SER CALC-MCNC: 4 G/DL (ref 2–4)
GLUCOSE SERPL-MCNC: 102 MG/DL (ref 74–99)
GRAM STN SPEC: ABNORMAL
GRAM STN SPEC: ABNORMAL
HCT VFR BLD AUTO: 37 % (ref 35–45)
HGB BLD-MCNC: 12.8 G/DL (ref 12–16)
LYMPHOCYTES # BLD: 6.3 K/UL (ref 0.8–3.5)
LYMPHOCYTES NFR BLD: 60 % (ref 20–51)
MCH RBC QN AUTO: 26.7 PG (ref 24–34)
MCHC RBC AUTO-ENTMCNC: 34.6 G/DL (ref 31–37)
MCV RBC AUTO: 77.2 FL (ref 74–97)
MONOCYTES # BLD: 0.6 K/UL (ref 0–1)
MONOCYTES NFR BLD: 6 % (ref 2–9)
NEUTS SEG # BLD: 3.2 K/UL (ref 1.8–8)
NEUTS SEG NFR BLD: 31 % (ref 42–75)
PLATELET # BLD AUTO: 331 K/UL (ref 135–420)
PLATELET COMMENTS,PCOM: ABNORMAL
PMV BLD AUTO: 9.6 FL (ref 9.2–11.8)
POTASSIUM SERPL-SCNC: 3.3 MMOL/L (ref 3.5–5.5)
PROT SERPL-MCNC: 7.2 G/DL (ref 6.4–8.2)
RBC # BLD AUTO: 4.79 M/UL (ref 4.2–5.3)
RBC MORPH BLD: ABNORMAL
SERVICE CMNT-IMP: ABNORMAL
SODIUM SERPL-SCNC: 142 MMOL/L (ref 136–145)
WBC # BLD AUTO: 10.4 K/UL (ref 4.6–13.2)
WBC MORPH BLD: ABNORMAL

## 2018-09-23 PROCEDURE — 93005 ELECTROCARDIOGRAM TRACING: CPT

## 2018-09-23 PROCEDURE — 99284 EMERGENCY DEPT VISIT MOD MDM: CPT

## 2018-09-23 PROCEDURE — 36415 COLL VENOUS BLD VENIPUNCTURE: CPT | Performed by: INTERNAL MEDICINE

## 2018-09-23 PROCEDURE — C9113 INJ PANTOPRAZOLE SODIUM, VIA: HCPCS | Performed by: HOSPITALIST

## 2018-09-23 PROCEDURE — 74011250637 HC RX REV CODE- 250/637: Performed by: INTERNAL MEDICINE

## 2018-09-23 PROCEDURE — 74011250636 HC RX REV CODE- 250/636: Performed by: HOSPITALIST

## 2018-09-23 PROCEDURE — 85025 COMPLETE CBC W/AUTO DIFF WBC: CPT | Performed by: INTERNAL MEDICINE

## 2018-09-23 PROCEDURE — 94762 N-INVAS EAR/PLS OXIMTRY CONT: CPT

## 2018-09-23 PROCEDURE — 80053 COMPREHEN METABOLIC PANEL: CPT | Performed by: INTERNAL MEDICINE

## 2018-09-23 PROCEDURE — 90686 IIV4 VACC NO PRSV 0.5 ML IM: CPT | Performed by: HOSPITALIST

## 2018-09-23 PROCEDURE — 74011250637 HC RX REV CODE- 250/637: Performed by: HOSPITALIST

## 2018-09-23 PROCEDURE — 90471 IMMUNIZATION ADMIN: CPT

## 2018-09-23 RX ORDER — ATENOLOL 100 MG/1
100 TABLET ORAL 2 TIMES DAILY
Qty: 30 TAB | Refills: 0 | Status: SHIPPED | OUTPATIENT
Start: 2018-09-23

## 2018-09-23 RX ORDER — HYDRALAZINE HYDROCHLORIDE 25 MG/1
25 TABLET, FILM COATED ORAL 3 TIMES DAILY
Qty: 90 TAB | Refills: 0 | Status: SHIPPED | OUTPATIENT
Start: 2018-09-23

## 2018-09-23 RX ORDER — AMLODIPINE BESYLATE 10 MG/1
10 TABLET ORAL DAILY
Qty: 30 TAB | Refills: 0 | Status: SHIPPED | OUTPATIENT
Start: 2018-09-23

## 2018-09-23 RX ORDER — CIPROFLOXACIN 500 MG/1
500 TABLET ORAL EVERY 12 HOURS
Qty: 6 TAB | Refills: 0 | Status: SHIPPED | OUTPATIENT
Start: 2018-09-23 | End: 2019-10-07

## 2018-09-23 RX ORDER — LISINOPRIL 20 MG/1
20 TABLET ORAL DAILY
Qty: 30 TAB | Refills: 0 | Status: SHIPPED | OUTPATIENT
Start: 2018-09-23 | End: 2021-08-11

## 2018-09-23 RX ADMIN — SODIUM CHLORIDE 40 MG: 9 INJECTION INTRAMUSCULAR; INTRAVENOUS; SUBCUTANEOUS at 09:47

## 2018-09-23 RX ADMIN — ATENOLOL 100 MG: 50 TABLET ORAL at 09:47

## 2018-09-23 RX ADMIN — HYDRALAZINE HYDROCHLORIDE 25 MG: 25 TABLET, FILM COATED ORAL at 09:48

## 2018-09-23 RX ADMIN — HYDRALAZINE HYDROCHLORIDE 25 MG: 25 TABLET, FILM COATED ORAL at 00:18

## 2018-09-23 RX ADMIN — LISINOPRIL 20 MG: 20 TABLET ORAL at 09:47

## 2018-09-23 RX ADMIN — AMLODIPINE BESYLATE 10 MG: 5 TABLET ORAL at 09:48

## 2018-09-23 RX ADMIN — INFLUENZA VIRUS VACCINE 0.5 ML: 15; 15; 15; 15 SUSPENSION INTRAMUSCULAR at 11:51

## 2018-09-23 RX ADMIN — CIPROFLOXACIN HYDROCHLORIDE 500 MG: 250 TABLET, FILM COATED ORAL at 09:48

## 2018-09-23 RX ADMIN — Medication 10 ML: at 06:56

## 2018-09-23 NOTE — DISCHARGE SUMMARY
Discharge Summary    Patient: Eduardo Slaughter MRN: [id number]  CSN: [id number]    YOB: 1955  Age: 70 y.o. Sex: female    DOA: 9/17/2018 LOS:  LOS: 5 days   Discharge Date:      Primary Care Provider:  Thao Fleming MD    Admission Diagnoses: Choledocholithiasis  UTI (urinary tract infection)  Hypomagnesemia  CHOLEDOCHOLITHIASIS W/OBSTRUCTION    Discharge Diagnoses:    Problem List as of 9/23/2018  Date Reviewed: 9/19/2018          Codes Class Noted - Resolved    * (Principal)Choledocholithiasis ICD-10-CM: K80.50  ICD-9-CM: 574.50  9/18/2018 - Present        Hypomagnesemia ICD-10-CM: S33.97  ICD-9-CM: 275.2  9/18/2018 - Present        UTI (urinary tract infection) ICD-10-CM: N39.0  ICD-9-CM: 599.0  9/18/2018 - Present        Hypertension ICD-10-CM: I10  ICD-9-CM: 401.9  Unknown - Present              Discharge Medications:     Current Discharge Medication List          Discharge Condition: stable    Procedures :   ERCP:    Multiple large choledocholithiasis removed with basket after doing 7 mm papillotomy. Gallbladder is excluded. Small hiatal hernia    Consults: GI      PHYSICAL EXAM   Visit Vitals    BP (!) 165/97 (BP 1 Location: Right arm, BP Patient Position: At rest)    Pulse 73    Temp 98.2 °F (36.8 °C)    Resp 16    Ht 4' 11\" (1.499 m)    Wt 63.1 kg (139 lb 1.8 oz)    SpO2 100%    BMI 28.1 kg/m2     General: Awake, cooperative, no acute distress    HEENT: NC, Atraumatic. PERRLA, EOMI. Anicteric sclerae. Lungs:  CTA Bilaterally. No Wheezing/Rhonchi/Rales. Heart:  Regular  rhythm,  No murmur, No Rubs, No Gallops  Abdomen: Soft, Non distended, Non tender. +Bowel sounds,   Extremities: No c/c/e  Psych:   Not anxious or agitated. Neurologic:  No acute neurological deficits. Admission HPI : Joe Burris  is a 70 y.o. female who has past history of HTN presents to ER with concerns of N/V abdominal pain.  Patient is Mexican speaking, son helped with interpritation. Patient ate dinner last night and after that she started with nausea/vomiting along with abdominal. Abdominal pain located epigastric and RUQ and at times radiating to back. Subjective fever and chills, denies any diarrhea. She denies any similar problems before. In ER he wbc elevated. Her US showed Cholelithiasis with mild gallbladder wall thickening. Dilated common bile duct up to 12 mm and dilated intrahepatic biliary ducts. Consider choledocholithiasis.       Hospital Course : Admitted and underwent an ERCP which helped with symptoms  . Her blood culture grew Ecoli on 9/19 but subsequent blood cultures were negative. She remained afebrile and asymptomatic and wanted to go home. We did consult with a surgeon and offered to keep her but she decided she watned to go home and follow up as an outpatient. Dr. Mehul Joseph and [facility] name was given and she will follow up there  As Dr. Trupti Alegria speaks [address] [facility] . She knows if she gets any fever or abdominal pain she is to come back to ER ASAP and is aware that is a possibility      Activity:ad anup   Diet: low fat    Follow-up:  In 1-23 days with dr. Trupti Alegria       Disposition: home      Minutes spent on discharge: 35        Labs: Results:       Chemistry Recent Labs      09/23/18 0145 09/22/18   0251  09/21/18 0518   GLU  102*   --   108*   NA  142   --   142   K  3.3*   --   3.8   CL  105   --   107   CO2  28   --   27   BUN  7   --   7   CREA  0.96   --   0.98   CA  8.9   --   8.9   AGAP  9   --   8   BUCR  7*   --   7*   AP  230*  238*  226*   TP  7.2  7.1  7.2   ALB  3.2*  3.1*  3.0*   GLOB  4.0  4.0  4.2*   AGRAT  0.8  0.8  0.7*      CBC w/Diff Recent Labs      09/23/18 0145 09/21/18 0518   WBC  10.4  6.8   RBC  4.79  4.70   HGB  12.8  12.5   HCT  37.0  36.7   PLT  331  298   GRANS  31*   --    LYMPH  60*   --    EOS  2   --       Cardiac Enzymes No results for input(s): CPK, CKND1, ASHELY in the last 72 hours.     No lab exists for component: CKRMB, TROIP   Coagulation No results for input(s): PTP, INR, APTT in the last 72 hours. No lab exists for component: INREXT    Lipid Panel Lab Results   Component Value Date/Time    Cholesterol, total 193 09/19/2018 05:00 AM    HDL Cholesterol 14 (L) 09/19/2018 05:00 AM    LDL, calculated 138.8 (H) 09/19/2018 05:00 AM    VLDL, calculated 40.2 09/19/2018 05:00 AM    Triglyceride 201 (H) 09/19/2018 05:00 AM    CHOL/HDL Ratio 13.8 (H) 09/19/2018 05:00 AM      BNP No results for input(s): BNPP in the last 72 hours. Liver Enzymes Recent Labs      09/23/18   0145   TP  7.2   ALB  3.2*   AP  230*   SGOT  55*      Thyroid Studies No results found for: T4, T3U, TSH, TSHEXT         Significant Diagnostic Studies: [address] [place] [address]    Result Date: 9/18/2018  EXAM: Limited right upper quadrant abdominal ultrasound INDICATION: Right upper quadrant pain. COMPARISON: None. _______________ FINDINGS: LIVER: Normal in echotexture. No focal mass. Normal direction of blood flow in the portal vein. BILIARY SYSTEM: There is intrahepatic biliary duct dilatation. The common bile duct is also dilated measuring 12 mm. GALLBLADDER: Multiple small gallstones are seen. There is mild gallbladder wall thickening up to 3.7 mm. Positive sonographic Woods's sign per technologist's report.] RIGHT KIDNEY: 10.1 cm in length. No hydronephrosis or renal mass. No visible calculi. PANCREAS: Head and body are unremarkable in appearance though the tail is obscured by overlying bowel gas. IVC: Visualized portions are unremarkable in appearance. OTHER: No free intraperitoneal fluid. _______________     IMPRESSION: Cholelithiasis with mild gallbladder wall thickening. Dilated common bile duct up to 12 mm and dilated intrahepatic biliary ducts. Consider choledocholithiasis. Xr Ercp Fluoroscopy    Result Date: 9/20/2018  Examination: ERCP.  HISTORY: Right upper quadrant abdominal pain, choledocholithiasis Fluoroscopic time: 3 minutes and 28 seconds Fluoroscopic dose (reference air kerma): 3.31 mGy Comparison: Right upper quadrant ultrasound 9/18/2018. FINDINGS: A series of 14 spot fluoroscopic images centered over the right upper lung are performed with comparison as above. Sequential balloon assisted cholangiogram demonstrates multiple intraluminal filling defects within a moderately dilated common bile duct. Intrahepatic biliary ductal system is opacified appears within normal limits. The pancreatic duct is nonopacified. IMPRESSION: 1. Multiple common duct stones with associated common bile duct dilatation.                 CC: Tin Craft MD

## 2018-09-23 NOTE — ED PROVIDER NOTES
EMERGENCY DEPARTMENT HISTORY AND PHYSICAL EXAM 
 
Date: 9/23/2018 Patient Name: Zulema Dalton History of Presenting Illness Chief Complaint Patient presents with  Medication Problem History Provided By: Patient and Patient's Daughter Chief Complaint: Medication Problem Duration: 2 Hours Timing:  Acute Location: N/A Quality: Overdosing Severity: Mild Modifying Factors: No modifying factors Associated Symptoms: denies any other associated signs or symptoms Additional History (Context):  
7:00 PM 
Zulema Dalton is a 70 y.o. female with PMHX of HTN who presents to the emergency department C/O taking extra dose  medications onset 2 hours ago. Per pt's daughter, pt was given blood pressure medications before being discharged from the hospital around 8:00 am and then went home and took her BP meds at 5:00 PM (10 mg Norvasc and 100 mg Atenolol) out of habit. Per pt's daughter, pt is only supposed to take this medication once a day. Pt denies CP, SOB, HA, fever, chills, dizziness, and any other sxs or complaints. PCP: Geoff Rivas MD 
 
Current Outpatient Prescriptions Medication Sig Dispense Refill  hydrALAZINE (APRESOLINE) 25 mg tablet Take 1 Tab by mouth three (3) times daily. 90 Tab 0  
 ciprofloxacin HCl (CIPRO) 500 mg tablet Take 1 Tab by mouth every twelve (12) hours. 6 Tab 0  
 lisinopril (PRINIVIL, ZESTRIL) 20 mg tablet Take 1 Tab by mouth daily. 30 Tab 0  
 atenolol (TENORMIN) 100 mg tablet Take 1 Tab by mouth two (2) times a day. 30 Tab 0  
 amLODIPine (NORVASC) 10 mg tablet Take 1 Tab by mouth daily. 30 Tab 0 Facility-Administered Medications Ordered in Other Encounters Medication Dose Route Frequency Provider Last Rate Last Dose  esmolol (BREVIBLOC) 100 mg/10 mL (10 mg/mL) injection   IntraVENous PRN Geneva CORTEZ CRNA   10 mg at 09/19/18 1516  
 fentaNYL citrate (PF) injection   IntraVENous PRN Geneva CORTEZ CRNA 25 mcg at 09/19/18 1518  midazolam (VERSED) injection   IntraVENous PRN Debo Georgiana, CRNA   1 mg at 09/19/18 1508  lidocaine (PF) (XYLOCAINE) 20 mg/mL (2 %) injection   IntraVENous PRN David Kojo V, CRNA   40 mg at 09/19/18 1528  propofol (DIPRIVAN) 10 mg/mL injection   IntraVENous PRN David Kojo V, CRNA   20 mg at 09/19/18 1623  
 rocuronium (ZEMURON) injection   IntraVENous PRN Debo Georgiana, CRNA   10 mg at 09/19/18 1552  PHENYLephrine (KELSEY-SYNEPHRINE) 10,000 mcg in 0.9% sodium chloride 100 mL infusion  10,000 mcg IntraVENous CONTINUOUS Deadra Jose V, CRNA   200 mcg at 09/19/18 1546  
 glucagon (GLUCAGEN) injection   IntraVENous PRN Debi Johnson MD   1 mg at 09/19/18 9845 8544  ondansetron (ZOFRAN) injection    PRN Debi Johnson MD   4 mg at 09/19/18 1615  lactated Ringers infusion   IntraVENous CONTINUOUS Zina Elder Lothes, DO      
 glycopyrrolate (ROBINUL) injection   IntraVENous PRN Zina Elder Lothes, DO   0.4 mg at 09/19/18 1641  
 neostigmine methylsulfate (PROSTIGMINE) 1 mg/mL syringe   IntraVENous PRN Zina Elder Lothes, DO   2 mg at 09/19/18 1641  
 ketorolac (TORADOL) injection   IntraVENous PRN Zina Elder Lothes, DO   15 mg at 09/19/18 1645 Past History Past Medical History: 
Past Medical History:  
Diagnosis Date  Hypertension Past Surgical History: 
History reviewed. No pertinent surgical history. Family History: 
History reviewed. No pertinent family history. Social History: 
Social History Substance Use Topics  Smoking status: Never Smoker  Smokeless tobacco: Never Used  Alcohol use No  
 
 
Allergies: 
No Known Allergies Review of Systems Review of Systems Constitutional: Negative for chills and fever. Respiratory: Negative for shortness of breath. Cardiovascular: Negative for chest pain. Neurological: Negative for dizziness and headaches. All other systems reviewed and are negative.  
 
 
Physical Exam  
 
 Vitals:  
 09/23/18 1849 09/23/18 1926 BP: 158/88 170/83 Pulse: 75 74 Resp: 16 18 Temp: 98.8 °F (37.1 °C) 98.1 °F (36.7 °C) SpO2: 99% 100% Weight: 63 kg (138 lb 14.2 oz) Height: 4' 11\" (1.499 m) Physical Exam  
Constitutional: She is oriented to person, place, and time. She appears well-developed and well-nourished. Alert, sitting up on stretcher, NAD HENT:  
Head: Normocephalic and atraumatic. Eyes: EOM are normal. Pupils are equal, round, and reactive to light. Neck: Normal range of motion. Neck supple. Cardiovascular: Normal rate, regular rhythm, normal heart sounds and intact distal pulses. No murmur heard. Pulmonary/Chest: Effort normal and breath sounds normal. No respiratory distress. She has no wheezes. She has no rales. Abdominal: Soft. Bowel sounds are normal. She exhibits no distension. There is no tenderness. There is no rebound and no guarding. Neurological: She is alert and oriented to person, place, and time. No neuro deficits Skin: Skin is warm and dry. Psychiatric: She has a normal mood and affect. Judgment normal.  
Nursing note and vitals reviewed. Diagnostic Study Results Labs - Recent Results (from the past 12 hour(s)) EKG, 12 LEAD, INITIAL Collection Time: 09/23/18  7:27 PM  
Result Value Ref Range Ventricular Rate 71 BPM  
 Atrial Rate 71 BPM  
 P-R Interval 160 ms QRS Duration 82 ms Q-T Interval 410 ms QTC Calculation (Bezet) 445 ms Calculated P Axis 43 degrees Calculated R Axis 70 degrees Calculated T Axis 57 degrees Diagnosis Sinus rhythm with premature atrial complexes Otherwise normal ECG When compared with ECG of 17-SEP-2018 23:32, 
premature atrial complexes are now present Radiologic Studies - No orders to display CT Results  (Last 48 hours) None CXR Results  (Last 48 hours) None Medications given in the ED- Medications - No data to display Medical Decision Making I am the first provider for this patient. I reviewed the vital signs, available nursing notes, past medical history, past surgical history, family history and social history. Vital Signs-Reviewed the patient's vital signs. Pulse Oximetry Analysis - 99% on RA Cardiac Monitor Rate: 71 bpm 
Rhythm: Sinus Rhythm with Premature atrial complexes EKG interpretation: (Preliminary) 7:35 PM  
Sinus rhythm with premature atrial complexes, 71 bpm, QRS duration 82 ms, No STEMI 
EKG read by Jose Wise MD at 7:29 PM 
 
 
Records Reviewed: Nursing Notes and Old Medical Records Provider Notes (Medical Decision Making): Pt presents after accidentally taking extra dose of her BP meds about 2 hours PTA. She is asxs with stable vital signs. Ambulatory with stable gait. Procedures: 
Procedures ED Course:  
7:00 PM Initial assessment performed. The patients presenting problems have been discussed, and they are in agreement with the care plan formulated and outlined with them. I have encouraged them to ask questions as they arise throughout their visit. 7:19 PM Discussed patient's history, exam, and available diagnostics results with Cynthia Wick. Lilian Villalba MD, ED Attending, who agree that patient is safe for discharge. Diagnosis and Disposition DISCHARGE NOTE: 
7:32 PM 
Ishaan Soto's  results have been reviewed with her. She has been counseled regarding her diagnosis, treatment, and plan. She verbally conveys understanding and agreement of the signs, symptoms, diagnosis, treatment and prognosis and additionally agrees to follow up as discussed. She also agrees with the care-plan and conveys that all of her questions have been answered.   I have also provided discharge instructions for her that include: educational information regarding their diagnosis and treatment, and list of reasons why they would want to return to the ED prior to their follow-up appointment, should her condition change. She has been provided with education for proper emergency department utilization. CLINICAL IMPRESSION: 
 
1. Medication overdose, accidental or unintentional, initial encounter PLAN: 
1. D/C Home 2. Discharge Medication List as of 9/23/2018  7:29 PM  
  
 
3. Follow-up Information Follow up With Details Comments Contact Info Ashia Weems MD Schedule an appointment as soon as possible for a visit in 2 days For primary care follow up [address] [place] [address] 
[phone] [facility] EMERGENCY DEPT Go to As needed, if symptoms worsen [address] [address] 
[phone]  
  
 
_______________________________ Attestations: This note is prepared by [facility]ORTO [facility], acting as Scribe for Rebecca Merino PA-C. Rebecca Merino PA-C:  The scribe's documentation has been prepared under my direction and personally reviewed by me in its entirety. I confirm that the note above accurately reflects all work, treatment, procedures, and medical decision making performed by me. 
_______________________________

## 2018-09-23 NOTE — ROUTINE PROCESS
Bedside and Verbal shift change report given to Aide Hunter RN (oncoming nurse) by Carlos Cohen RN (offgoing nurse). Report included the following information SBAR and Kardex.

## 2018-09-23 NOTE — PROGRESS NOTES
2359-Resting quietly in bed with daughter at bedside. Stable. 0007-Assessment complete. Stable. Call light and personal items within reach. Patient NPO per orders. 0338-No change from initial assessment. Daughter at bedside. Stable. 0630-Resting quietly in bed, stable. Daughter at bedside. Shift Summary:  Uneventful shift. NPO since midnight. Heparin held for possible surgery today.

## 2018-09-23 NOTE — ROUTINE PROCESS
Bedside and Verbal shift change report given to Kelsi Ott RN (oncoming nurse) by Kimmie Naranjo RN (offgoing nurse). Report included the following information SBAR and Kardex.

## 2018-09-23 NOTE — DISCHARGE INSTRUCTIONS
Aprenda sobre cálculos biliares - [ Learning About Gallstones ]  ¿Qué son los cálculos biliares? Los cálculos biliares son piedras que se carmen en la vesícula biliar. La vesícula biliar es un saco pequeño ubicado romain debajo del hígado. [place] la bilis secretada por el hígado. La bilis lo ayuda a [employer]. Los cálculos biliares pueden ser más pequeños que un grano de arena o tan grandes ana corazon pelota de golf. Los cálculos biliares se carmen cuando el colesterol y otras sustancias que se encuentran en la bilis carmen cálculos. También pueden formarse si la vesícula biliar no se vacía ana debería. Los cálculos también pueden formarse en el colédoco o en el conducto cístico. Estos tubos llevan la bilis de la vesícula biliar y el hígado al intestino pan. ¿Qué pasa cuando tiene cálculos biliares? Los cálculos biliares pueden causar muchos problemas diferentes, ana:  · Corazon obstrucción en el colédoco.  · Inflamación o infección de la vesícula biliar (colecistitis aguda). [place] puede pasar cuando un cálculo biliar bloquea el conducto cístico.  · Inflamación o infección del colédoco (colangitis). [place] puede pasar cuando los cálculos biliares se atascan en el colédoco. En raras ocasiones, esto puede dañar el hígado o diseminar corazon infección. · Pancreatitis, corazon inflamación del páncreas. ¿Cuáles son los síntomas? · La mayoría de las personas que tienen cálculos biliares no tienen síntomas. · Los síntomas pueden incluir:  ¨ Dolor leve en la boca del estómago o en la parte superior derecha del abdomen. Puede extenderse a la parte superior derecha de la espalda o al área del omóplato. ¨ Dolor intenso que puede aparecer y desaparecer o ser juma. Puede ser peor al comer. [place] y escalofríos, si un cálculo biliar está obstruyendo un conducto y está causando corazon infección. ¨ Tinte amarillo en la piel y en la parte marv de los ojos. ¿Cómo se pueden prevenir los cálculos biliares?   No hay corazon manera cierta de prevenir los cálculos biliares. Sin embargo, usted puede reducir el riesgo de que se formen cálculos biliares que puedan causar síntomas. · Mantenga un peso saludable. Si necesita bajar de peso, hágalo despacio y en forma sensata. · Coma comidas equilibradas en forma regular. · Danetta [place] y james ejercicio regularmente. ¿Cómo se tratan los cálculos biliares? · Si usted no tiene síntomas, probablemente no necesite tratamiento. · Si tiene síntomas leves, es posible que randle médico le diga que debe sobia un analgésico (medicamento para el dolor) y que espere para edison si desaparece el dolor. · Para earnestine intensos o infección, o si tiene más de un ataque de cálculos biliares, randle médico puede sugerirle que le extraigan la vesícula biliar. El cuerpo funciona umair sin la vesícula biliar. La atención de seguimiento es corazon parte clave de randle tratamiento y seguridad. Asegúrese de hacer y acudir a todas las citas, y llame a randle médico si está teniendo problemas. También es corazon buena idea saber los resultados de jasmyn exámenes y mantener corazon lista de los medicamentos que marc. ¿Dónde puede encontrar más información en inglés? Randal Navarro a http://reema-balta.info/. Katy Andrade P519 en la búsqueda para aprender más acerca de \"Aprenda sobre cálculos biliares - [ Learning About Gallstones ]. \"  Revisado: 12 duke, 2017  Versión del contenido: 11.7  © 8426-7775 [employer]. Las instrucciones de cuidado fueron adaptadas bajo licencia por [employer] (which disclaims liability or warranty for this information). Si usted tiene [place] afección médica o sobre estas instrucciones, siempre pregunte a randle profesional de al. [facility] niega toda garantía o responsabilidad por randle uso de esta información.

## 2018-09-23 NOTE — DISCHARGE INSTRUCTIONS
Sobredosis accidental de medicamentos: Instrucciones de cuidado - [ Accidental Overdose of Medicine: Care Instructions ]  Instrucciones de cuidado    Prácticamente cualquier medicamento puede causar daño si se marc en demasiada cantidad. Usted ha recibido tratamiento para ayudar a randle organismo a deshacerse de corazon sobredosis de un medicamento. [facility] cesar sido un medicamento de [employer]. O podría cesar sido santa que le recetó un médico. Hasta puede cesar sido corazon vitamina o un suplemento. [place] el [place], el médico puede haberle dado líquidos y [place]. También pueden haberle hecho pruebas de laboratorio. Luego, el médico se aseguró de que usted estaba lo suficientemente umair ana para regresar a randle casa. El médico lo salinas examinado minuciosamente, grant pueden presentarse problemas más tarde. Si nota algún problema o nuevos síntomas, busque tratamiento médico de inmediato. La atención de seguimiento es corazon parte clave de randle tratamiento y seguridad. Asegúrese de hacer y acudir a todas las citas, y llame a randle médico si está teniendo problemas. También es corazon buena idea saber los resultados de jasmyn exámenes y mantener corazon lista de los medicamentos que marc. ¿Cómo puede cuidarse en el hogar? Cuidados en el hogar  · [employer] líquido. Si tiene corazon enfermedad renal, cardíaca o hepática y tiene que restringir los líquidos, hable con randle médico antes de aumentar la cantidad de líquido que jose. · Si normalmente marc medicamentos, pregúntele a randle médico cuándo puede comenzar a tomarlos nuevamente. · [place] la información que viene con todos los medicamentos. Si tiene preguntas, consulte con randle médico o farmacéutico.  Prevención  · Sea jesse con los medicamentos. [place] jasmyn medicamentos exactamente ana se los recetaron o ana lo indique la [place]. Llame a randle médico si cheyanne que está teniendo un problema con randle medicamento.   · Mantenga los medicamentos en el envase en que vinieron con la etiqueta original.  · [place] qué hacer si omite corazon dosis de randle medicamento. ¿Cuándo debe pedir ayuda? Llame al 911 en cualquier momento que considere que necesita atención de [place]. Por ejemplo, llame si:    · Se desmayó (perdió el conocimiento).     · Tiene dificultad para respirar.     · Está somnoliento o es difícil despertarlo.    Llame a randle médico ahora mismo o busque atención médica inmediata si:    · [place].     · Tiene dolor de shaunna nuevo o peor.     · Está mareado o aturdido o siente ana si fuera a desmayarse.    Preste especial atención a los cambios en randle al y asegúrese de comunicarse con randle médico si:    · No mejora ana se esperaba. ¿Dónde puede encontrar más información en inglés? Anthony Prado a http://reema-balta.info/. [employer] C165 en la búsqueda para aprender más acerca de \"Sobredosis accidental de medicamentos: Instrucciones de cuidado - [ Accidental Overdose of Medicine: Care Instructions ]. \"  Revisado: 10 septiembre, 2017  Versión del contenido: 11.7  © 8949-3034 [employer]wise, Incorporated. Las instrucciones de cuidado fueron adaptadas bajo licencia por [employer] (which disclaims liability or warranty for this information). Si usted tiene [place] afección médica o sobre estas instrucciones, siempre pregunte a randle profesional de al. Healthwise, Incorporated niega toda garantía o responsabilidad por randle uso de esta información.

## 2018-09-23 NOTE — ROUTINE PROCESS
Bedside and Verbal shift change report given to Linda Chaidez RN (oncoming nurse) by Regina Gutierres RN (offgoing nurse). Report included the following information Kardex, Intake/Output, MAR and Recent Results.

## 2018-09-23 NOTE — CONSULTS
[address] [facility] Cathy Mcfarland  MR#: [id number]  : 1955  ACCOUNT #: [de-identified]   DATE OF SERVICE: 2018    REASON FOR CONSULTATION:  Cholelithiasis, possible cholecystitis, history of choledocholithiasis, status post ERCP and stone extraction. HISTORY OF PRESENT ILLNESS:  The patient is a 17-year-old Kiswahili speaking  female (all additional history obtained with the assistance of the patient's daughter who was with her at the bedside during today's encounter) with a history of hypertension, admitted to the hospitalist service on 2018 with a 2-3 day history prior to that a history of epigastric and right upper quadrant pain along with associated nausea and vomiting. She has had intermittent periods of this for the last 7 days usually associated with eating, although no particular type of food has been identified. She denies any previous episode. The pain often times radiates to the back. At this time, she denies any fever or chills. She denies any changes in bowel movements. At her initial evaluation, ultrasound reportedly showed cholelithiasis with mild gallbladder wall thickening with dilated common bile duct of 12 mm, dilated intrahepatic duct. She was subsequently diagnosed with common bile duct stones and she underwent ERCP 2 days ago with stone extraction. She has not been seen by general surgery up until today. Currently, she states that her abdominal pain and nausea have improved. She has been tolerating small amounts of soft diet today. She has been afebrile for more than the last 24 hours. She denies any chest pain, shortness of breath or other symptoms at this time. PAST MEDICAL HISTORY:  Hypertension. She denies any history of heart disease, kidney disease, diabetes, asthma, seizures other medical problems. PAST SURGICAL HISTORY:  She denies any previous abdominal operations. MEDICATIONS:  Please see med rec sheet. I see her 2 blood pressure medications. No antiplatelet or anticoagulation medications. ALLERGIES:  NO DRUG ALLERGIES. SOCIAL HISTORY:  She does not smoke or use tobacco products. She has not drink. She is not using illicit drugs. FAMILY HISTORY:  Significant for hypertension, otherwise noncontributory. REVIEW OF SYSTEMS:  Twelve-point review of system was reviewed with the patient and negative apart from that listed in HPI. PHYSICAL EXAMINATION:  GENERAL:  She is well-developed, well-nourished, no acute distress. VITAL SIGNS:  As mentioned, she has been afebrile for more than the last 2 days. Last temperature recorded was 98.2. Blood pressure has been elevated, but with internal medicine interventions today last blood pressure 185/63, pulse rate 80, respirations 16-20, satting % on room air. HEENT:  Normocephalic, atraumatic. Pupils equal, round, react to light and accommodation. Extraocular muscles intact. Sclerae are anicteric bilaterally. NECK:  Supple, no JVD. CARDIOVASCULAR:  Regular rate and rhythm. LUNGS:  Clear to auscultation. No accessory muscle use, no wheezing. ABDOMEN:  Soft, nondistended. Good bowel sounds. Mild right upper quadrant tenderness. Negative Woods sign. No palpable hernias. RECTAL:  Deferred. EXTREMITIES:  No clubbing, cyanosis, edema. No calf tenderness. Capillary refill brisk and feet edema. ANCILLARY STUDIES:  Admission labs first she was noted to have an elevated white count of 17.4, H and H at that time 13.3 and 38.7, platelets of 698; these were from 09/18. BMP essentially normal apart from minimally elevated potassium at that time. SGOT was 250 elevated, ALT was 406 also elevated. Last recorded white cell was from yesterday 09/21 was 6.8. H and H essentially stable at 12.5 and 36.7, platelets 523.   BMP from yesterday was normal.  LFTs from today showed improving ALT 92, normal AST 35, total bilirubin approaching normal and continuing to improve over the last 7 days, now at 1.1 with direct bilirubin 0.6, alkaline phosphatase elevated at 238. Last recorded lipase on the 20th was still at 188. RADIOLOGY STUDIES:  Ultrasound from 09/18 showed multiple small gallstones with mild gallbladder wall thickening up to 3.7 mm. At that time, it was mentioned to be a positive sonographic Woods sign. Common bile duct was dilated up to 12 mm consistent with cholelithiasis with possible cholecystitis and a dilated common bile duct. ERCP dated the 19th showed multiple common duct stones with associated common bile duct dilatation and the op note says stones were extracted. ASSESSMENT:  A 56-year-old  female with:  1. Hypertension, somewhat controlled at this time. 2.  Cholelithiasis with possible cholecystitis, choledocholithiasis, status post ERCP and stone extraction. PLAN:  Discussed the case with the admitting hospitalist team who at this time seemed to be satisfied with her blood pressure as currently managed. As long as blood pressure remains adequately controlled tonight and into early tomorrow morning, they had no problem proceeding to the operating room under general anesthesia for proposed laparoscopic cholecystectomy. I have discussed the case with anesthesia at that time to make sure they are in agreement. I discussed with the patient with her daughter helping with translation, the nature of that surgery, alternatives of surgery as well as benefits, risks.   The risks include but are not limited to medication reaction, anesthesia complication, cardiac and pulmonary complications including death, bleeding, infection, possible need to convert to an open procedure, inadvertent injury to intra-abdominal structures requiring additional surgery, postoperative bile leak requiring drainage or other intervention, retained or de aramis common bile duct stones requiring endoscopic or other intervention, postoperative pain, persistence of symptoms despite surgery, postoperative pancreatitis requiring additional intervention, incisional pain, poor wound healing postoperatively, incisional hernia, etc.  The patient understands these risks and is willing to give informed consent to proceed with surgery as long as, as mentioned above, internal medicine is satisfied with her blood pressure as it is maintained overnight. We will follow up in the morning to make final decision whether or not to proceed with surgery at this time.       BRENDA JI MD RP / TRINH  D: 09/22/2018 21:53     T: 09/22/2018 23:19  JOB #: [id number]  CC: Daisha Lopez MD

## 2018-09-23 NOTE — PROGRESS NOTES
Hospitalist Progress Note Patient: Isaak Lomas MRN: [id number]  CSN: [id number] YOB: 1955  Age: 70 y.o. Sex: female DOA: 9/17/2018 LOS:  LOS: 5 days Assessment and Plan: 
 
Principal Problem: 
  Choledocholithiasis (9/18/2018) Active Problems: Hypomagnesemia (9/18/2018) UTI (urinary tract infection) (9/18/2018) Hypertension () Patient wants to go home. We discussed risk versus benefit of this Chief complaint:  Abdominal pain Subjective: 
 
Feels good wants to go home Review of systems: 
 
General: No fevers or chills. Cardiovascular: No chest pain or pressure. No palpitations. Pulmonary: No shortness of breath. Gastrointestinal: No nausea, vomiting. Objective: 
 
Vital signs/Intake and Output: 
Visit Vitals  BP (!) 165/97 (BP 1 Location: Right arm, BP Patient Position: At rest)  Pulse 73  Temp 98.2 °F (36.8 °C)  Resp 16  
 Ht 4' 11\" (1.499 m)  Wt 63.1 kg (139 lb 1.8 oz)  SpO2 100%  BMI 28.1 kg/m2 Current Shift:    
Last three shifts:  09/21 1901 - 09/23 0700 In: 5067 [P.O.:820; I.V.:1006] Out: 200 [Urine:200] Physical Exam: 
General: NAD, AAOx3. Non-toxic. HEENT: NC/AT. PERRLA, EOMI.  MMM. Lungs: Nml inspection. CTA B/L. No wheezing, rales or rhonchi. Heart:  S1S2 RRR,  PMI mid 5th IC space. No M/RG. Abdomen: Soft, NT/ND.  BS+. No peritoneal signs. Extremities: No C/C/E. Psych:   Nml affect. Neurologic:  2-12 intact. Strength 5/5 throughout. Sensation symmetrical. 
 
 
 
 
Labs: Results:  
   
Chemistry Recent Labs  
   09/23/18 
 0145  09/22/18 
 0251  09/21/18 
 0518 GLU  102*   --   108* NA  142   --   142  
K  3.3*   --   3.8 CL  105   --   107 CO2  28   --   27 BUN  7   --   7  
CREA  0.96   --   0.98  
CA  8.9   --   8.9 AGAP  9   --   8  
BUCR  7*   --   7* AP  230*  238*  226* TP  7.2  7.1  7.2 ALB  3.2*  3.1*  3.0*  
GLOB  4.0  4.0  4.2*  
 AGRAT  0.8  0.8  0.7* CBC w/Diff Recent Labs  
   09/23/18 
 0145  09/21/18 
 0518 WBC  10.4  6.8  
RBC  4.79  4.70 HGB  12.8  12.5 HCT  37.0  36.7 PLT  331  298 GRANS  31*   --   
LYMPH  60*   --   
EOS  2   --   
  
Cardiac Enzymes No results for input(s): CPK, CKND1, ASHELY in the last 72 hours. No lab exists for component: Gail Sharif Coagulation No results for input(s): PTP, INR, APTT in the last 72 hours. No lab exists for component: INREXT Lipid Panel Lab Results Component Value Date/Time Cholesterol, total 193 09/19/2018 05:00 AM  
 HDL Cholesterol 14 (L) 09/19/2018 05:00 AM  
 LDL, calculated 138.8 (H) 09/19/2018 05:00 AM  
 VLDL, calculated 40.2 09/19/2018 05:00 AM  
 Triglyceride 201 (H) 09/19/2018 05:00 AM  
 CHOL/HDL Ratio 13.8 (H) 09/19/2018 05:00 AM  
  
BNP No results for input(s): BNPP in the last 72 hours. Liver Enzymes Recent Labs  
   09/23/18 
 0145 TP  7.2 ALB  3.2* AP  230* SGOT  55* Thyroid Studies No results found for: T4, T3U, TSH, TSHEXT Procedures/imaging: see electronic medical records for all procedures/Xrays and details which were not copied into this note but were reviewed prior to creation of Plan

## 2018-09-23 NOTE — ED TRIAGE NOTES
Pt discharged from hospital today approx 3 pm , prior to leaving the hospital had 100mg  Atenolol and 10mg Amlodipine; At approx 530 pm pt took 2-50mg tablets of atenolol and 2-5mg tablets of amlodipine by mistake;  Pt's daughter called nurse and was told to return for monitoring

## 2018-09-25 LAB
BACTERIA SPEC CULT: NORMAL
BACTERIA SPEC CULT: NORMAL
SERVICE CMNT-IMP: NORMAL
SERVICE CMNT-IMP: NORMAL

## 2018-10-07 LAB
ATRIAL RATE: 85 BPM
CALCULATED P AXIS, ECG09: 68 DEGREES
CALCULATED R AXIS, ECG10: 69 DEGREES
CALCULATED T AXIS, ECG11: 42 DEGREES
DIAGNOSIS, 93000: NORMAL
P-R INTERVAL, ECG05: 140 MS
Q-T INTERVAL, ECG07: 370 MS
QRS DURATION, ECG06: 90 MS
QTC CALCULATION (BEZET), ECG08: 440 MS
VENTRICULAR RATE, ECG03: 85 BPM

## 2018-10-14 LAB
ATRIAL RATE: 71 BPM
CALCULATED P AXIS, ECG09: 43 DEGREES
CALCULATED R AXIS, ECG10: 70 DEGREES
CALCULATED T AXIS, ECG11: 57 DEGREES
DIAGNOSIS, 93000: NORMAL
P-R INTERVAL, ECG05: 160 MS
Q-T INTERVAL, ECG07: 410 MS
QRS DURATION, ECG06: 82 MS
QTC CALCULATION (BEZET), ECG08: 445 MS
VENTRICULAR RATE, ECG03: 71 BPM

## 2019-10-07 ENCOUNTER — HOSPITAL ENCOUNTER (EMERGENCY)
Age: 64
Discharge: HOME | End: 2019-10-07
Attending: EMERGENCY MEDICINE | Admitting: EMERGENCY MEDICINE
Payer: MEDICARE

## 2019-10-07 ENCOUNTER — HOSPITAL ENCOUNTER (EMERGENCY)
Dept: CT IMAGING | Age: 64
Discharge: HOME | End: 2019-10-07
Attending: EMERGENCY MEDICINE
Payer: MEDICARE

## 2019-10-07 VITALS
DIASTOLIC BLOOD PRESSURE: 86 MMHG | RESPIRATION RATE: 20 BRPM | OXYGEN SATURATION: 100 % | TEMPERATURE: 98 F | HEART RATE: 122 BPM | BODY MASS INDEX: 30.84 KG/M2 | HEIGHT: 59 IN | SYSTOLIC BLOOD PRESSURE: 127 MMHG | WEIGHT: 153 LBS

## 2019-10-07 DIAGNOSIS — R45.89 FEELING ANXIOUS: ICD-10-CM

## 2019-10-07 DIAGNOSIS — R00.0 TACHYARRHYTHMIA: ICD-10-CM

## 2019-10-07 DIAGNOSIS — S09.90XA INJURY OF HEAD, INITIAL ENCOUNTER: Primary | ICD-10-CM

## 2019-10-07 DIAGNOSIS — D72.829 LEUKOCYTOSIS, UNSPECIFIED TYPE: ICD-10-CM

## 2019-10-07 DIAGNOSIS — I10 ESSENTIAL HYPERTENSION: ICD-10-CM

## 2019-10-07 PROBLEM — R10.13 ABDOMINAL PAIN, EPIGASTRIC: Status: ACTIVE | Noted: 2019-10-07

## 2019-10-07 LAB
ALBUMIN SERPL-MCNC: 4.3 G/DL (ref 3.4–5)
ALBUMIN/GLOB SERPL: 1.1 {RATIO} (ref 0.8–1.7)
ALP SERPL-CCNC: 119 U/L (ref 45–117)
ALT SERPL-CCNC: 21 U/L (ref 13–56)
ANION GAP SERPL CALC-SCNC: 7 MMOL/L (ref 3–18)
APPEARANCE UR: CLEAR
AST SERPL-CCNC: 16 U/L (ref 10–38)
BASOPHILS # BLD: 0 K/UL (ref 0–0.1)
BASOPHILS NFR BLD: 0 % (ref 0–2)
BILIRUB SERPL-MCNC: 0.5 MG/DL (ref 0.2–1)
BILIRUB UR QL: NEGATIVE
BUN SERPL-MCNC: 18 MG/DL (ref 7–18)
BUN/CREAT SERPL: 16 (ref 12–20)
CALCIUM SERPL-MCNC: 9.7 MG/DL (ref 8.5–10.1)
CHLORIDE SERPL-SCNC: 101 MMOL/L (ref 100–111)
CK MB CFR SERPL CALC: 0.7 % (ref 0–4)
CK MB SERPL-MCNC: 1.3 NG/ML (ref 5–25)
CK SERPL-CCNC: 177 U/L (ref 26–192)
CO2 SERPL-SCNC: 29 MMOL/L (ref 21–32)
COLOR UR: YELLOW
CREAT SERPL-MCNC: 1.12 MG/DL (ref 0.6–1.3)
DIFFERENTIAL METHOD BLD: ABNORMAL
EOSINOPHIL # BLD: 0 K/UL (ref 0–0.4)
EOSINOPHIL NFR BLD: 0 % (ref 0–5)
ERYTHROCYTE [DISTWIDTH] IN BLOOD BY AUTOMATED COUNT: 13.5 % (ref 11.6–14.5)
GLOBULIN SER CALC-MCNC: 4 G/DL (ref 2–4)
GLUCOSE SERPL-MCNC: 135 MG/DL (ref 74–99)
GLUCOSE UR STRIP.AUTO-MCNC: NEGATIVE MG/DL
HCT VFR BLD AUTO: 39.3 % (ref 35–45)
HGB BLD-MCNC: 13.3 G/DL (ref 12–16)
HGB UR QL STRIP: NEGATIVE
KETONES UR QL STRIP.AUTO: NEGATIVE MG/DL
LACTATE BLD-SCNC: 1.78 MMOL/L (ref 0.4–2)
LEUKOCYTE ESTERASE UR QL STRIP.AUTO: NEGATIVE
LIPASE SERPL-CCNC: 368 U/L (ref 73–393)
LYMPHOCYTES # BLD: 1.4 K/UL (ref 0.9–3.6)
LYMPHOCYTES NFR BLD: 9 % (ref 21–52)
MCH RBC QN AUTO: 26.9 PG (ref 24–34)
MCHC RBC AUTO-ENTMCNC: 33.8 G/DL (ref 31–37)
MCV RBC AUTO: 79.4 FL (ref 74–97)
MONOCYTES # BLD: 0.5 K/UL (ref 0.05–1.2)
MONOCYTES NFR BLD: 3 % (ref 3–10)
NEUTS SEG # BLD: 13.6 K/UL (ref 1.8–8)
NEUTS SEG NFR BLD: 88 % (ref 40–73)
NITRITE UR QL STRIP.AUTO: NEGATIVE
PH UR STRIP: 7.5 [PH] (ref 5–8)
PLATELET # BLD AUTO: 381 K/UL (ref 135–420)
PMV BLD AUTO: 9.5 FL (ref 9.2–11.8)
POTASSIUM SERPL-SCNC: 3.2 MMOL/L (ref 3.5–5.5)
PROT SERPL-MCNC: 8.3 G/DL (ref 6.4–8.2)
PROT UR STRIP-MCNC: NEGATIVE MG/DL
RBC # BLD AUTO: 4.95 M/UL (ref 4.2–5.3)
SODIUM SERPL-SCNC: 137 MMOL/L (ref 136–145)
SP GR UR REFRACTOMETRY: 1.01 (ref 1–1.03)
TROPONIN I SERPL-MCNC: <0.02 NG/ML (ref 0–0.04)
UROBILINOGEN UR QL STRIP.AUTO: 0.2 EU/DL (ref 0.2–1)
WBC # BLD AUTO: 15.5 K/UL (ref 4.6–13.2)

## 2019-10-07 PROCEDURE — 70450 CT HEAD/BRAIN W/O DYE: CPT

## 2019-10-07 PROCEDURE — 83605 ASSAY OF LACTIC ACID: CPT

## 2019-10-07 PROCEDURE — 82550 ASSAY OF CK (CPK): CPT

## 2019-10-07 PROCEDURE — 96374 THER/PROPH/DIAG INJ IV PUSH: CPT

## 2019-10-07 PROCEDURE — 74011250637 HC RX REV CODE- 250/637: Performed by: EMERGENCY MEDICINE

## 2019-10-07 PROCEDURE — 71260 CT THORAX DX C+: CPT

## 2019-10-07 PROCEDURE — 99285 EMERGENCY DEPT VISIT HI MDM: CPT

## 2019-10-07 PROCEDURE — 85025 COMPLETE CBC W/AUTO DIFF WBC: CPT

## 2019-10-07 PROCEDURE — 80053 COMPREHEN METABOLIC PANEL: CPT

## 2019-10-07 PROCEDURE — 74011250636 HC RX REV CODE- 250/636: Performed by: EMERGENCY MEDICINE

## 2019-10-07 PROCEDURE — 74011636320 HC RX REV CODE- 636/320: Performed by: EMERGENCY MEDICINE

## 2019-10-07 PROCEDURE — 81003 URINALYSIS AUTO W/O SCOPE: CPT

## 2019-10-07 PROCEDURE — 93005 ELECTROCARDIOGRAM TRACING: CPT

## 2019-10-07 PROCEDURE — 83690 ASSAY OF LIPASE: CPT

## 2019-10-07 RX ORDER — ACETAMINOPHEN 10 MG/ML
1000 INJECTION, SOLUTION INTRAVENOUS ONCE
Status: COMPLETED | OUTPATIENT
Start: 2019-10-07 | End: 2019-10-07

## 2019-10-07 RX ORDER — LABETALOL HCL 20 MG/4 ML
20 SYRINGE (ML) INTRAVENOUS
Status: COMPLETED | OUTPATIENT
Start: 2019-10-07 | End: 2019-10-07

## 2019-10-07 RX ADMIN — LABETALOL 20 MG/4 ML (5 MG/ML) INTRAVENOUS SYRINGE 20 MG: at 13:24

## 2019-10-07 RX ADMIN — IOPAMIDOL 100 ML: 612 INJECTION, SOLUTION INTRAVENOUS at 16:11

## 2019-10-07 RX ADMIN — ACETAMINOPHEN 1000 MG: 10 INJECTION, SOLUTION INTRAVENOUS at 15:40

## 2019-10-07 RX ADMIN — HYDROCHLOROTHIAZIDE: 25 TABLET ORAL at 15:31
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
## 2019-10-07 NOTE — ED PROVIDER NOTES
EMERGENCY DEPARTMENT HISTORY AND PHYSICAL EXAM    Date: 10/7/2019  Patient Name: Codie Navarro    History of Presenting Illness     Chief Complaint   Patient presents with    Head Injury         History Provided By: Patient and Patient's Son      Codie Navarro is a 75 y.o. female with PMHX of hypertension on multiple medications including lisinopril, atenolol, amlodipine, hydralazine who presents to the emergency department C/O head injury. Patient is accompanied by her son who provides most of the history due to her Icelandic-speaking capacity. States earlier today the patient was moving some cinderblocks in the backyard and 1 of them slipped falling down on top of her head. She denies any loss of consciousness. Upon arrival to the emergency department the patient was noted to be hypertensive as well as tachycardic. Patient states she has taken all of her medications this morning as directed. Denies any chest pain, shortness of breath, abdominal pain, nausea, vomiting, diarrhea, constipation, blurry vision, lightheadedness. Hannah Bird PCP: Theron Welsh MD    Current Outpatient Medications   Medication Sig Dispense Refill    hydrALAZINE (APRESOLINE) 25 mg tablet Take 1 Tab by mouth three (3) times daily. 90 Tab 0    lisinopril (PRINIVIL, ZESTRIL) 20 mg tablet Take 1 Tab by mouth daily. 30 Tab 0    atenolol (TENORMIN) 100 mg tablet Take 1 Tab by mouth two (2) times a day. 30 Tab 0    amLODIPine (NORVASC) 10 mg tablet Take 1 Tab by mouth daily.  30 Tab 0     Facility-Administered Medications Ordered in Other Encounters   Medication Dose Route Frequency Provider Last Rate Last Dose    esmolol (BREVIBLOC) 100 mg/10 mL (10 mg/mL) injection   IntraVENous PRN Mariana Angelucci V, CRNA   10 mg at 09/19/18 1516    fentaNYL citrate (PF) injection   IntraVENous PRN Cassandria Manuel, CRNA   25 mcg at 09/19/18 1518    midazolam (VERSED) injection   IntraVENous PRN Cassandria Manuel, CRNA   1 mg at 09/19/18 1508    lidocaine (PF) (XYLOCAINE) 20 mg/mL (2 %) injection   IntraVENous PRN Fayamile Cierra, Deadra V, CRNA   40 mg at 09/19/18 1528    propofol (DIPRIVAN) 10 mg/mL injection   IntraVENous PRN Natalya Tippecanoe V, CRNA   20 mg at 09/19/18 1623    rocuronium (ZEMURON) injection   IntraVENous PRN Natalya Tippecanoe V, CRNA   10 mg at 09/19/18 1552    PHENYLephrine (KELSEY-SYNEPHRINE) 10,000 mcg in 0.9% sodium chloride 100 mL infusion  10,000 mcg IntraVENous CONTINUOUS Faythe Cierra, Deadra V, CRNA   200 mcg at 09/19/18 1546    glucagon (GLUCAGEN) injection   IntraVENous PRN Luis M Bond MD   1 mg at 09/19/18 1634    ondansetron (ZOFRAN) injection    PRN Luis M Bond MD   4 mg at 09/19/18 1615    lactated Ringers infusion   IntraVENous CONTINUOUS Lothes, Amie Jesus, DO        glycopyrrolate (ROBINUL) injection   IntraVENous PRN [facility], DO   0.4 mg at 09/19/18 1641    neostigmine methylsulfate (PROSTIGMINE) 1 mg/mL syringe   IntraVENous PRN [facility], DO   2 mg at 09/19/18 1641    ketorolac (TORADOL) injection   IntraVENous PRN [facility], DO   15 mg at 09/19/18 1645       Past History     Past Medical History:  Past Medical History:   Diagnosis Date    Hypertension        Past Surgical History:  History reviewed. No pertinent surgical history. Family History:  History reviewed. No pertinent family history. Social History:  Social History     Tobacco Use    Smoking status: Never Smoker    Smokeless tobacco: Never Used   Substance Use Topics    Alcohol use: No    Drug use: Not on file       Allergies:  No Known Allergies      Review of Systems   Review of Systems   Constitutional: Negative for fever. Respiratory: Negative for shortness of breath. Cardiovascular: Negative for chest pain. Gastrointestinal: Negative for abdominal distention, constipation, nausea and vomiting. Musculoskeletal: Negative for arthralgias, gait problem, joint swelling and myalgias.    Neurological: Positive for headaches. Negative for dizziness, syncope, weakness and numbness. Physical Exam     Vitals:    10/07/19 1530 10/07/19 1630 10/07/19 1745 10/07/19 1800   BP: 192/81 158/65 133/78 127/86   Pulse: 88 78  (!) 122   Resp: 20 18  20   Temp:       SpO2: 99% 99%  100%   Weight:       Height:         Physical Exam    Nursing notes and vital signs reviewed    Constitutional: Non toxic appearing, no acute distress  Head: Normocephalic, abrasion noted to the left parietal scalp. No palpable deformity  Eyes: Pupils are equal, round, and reactive to light, EOMI  Neck: Supple  Cardiovascular: Tachycardic, regular rhythm, no murmurs, rubs, or gallops  Chest: Normal work of breathing and chest excursion bilaterally  Lungs: Clear to ausculation bilaterally  Abdomen: Soft, non tender, non distended, normoactive bowel sounds  Back: No evidence of trauma or deformity  Extremities: No evidence of trauma or deformity, no LE edema  Skin: Warm and dry, normal cap refill  Neuro: Alert and appropriate, CN intact, normal speech, strength and sensation full and symmetric bilaterally, normal gait, normal coordination  Psychiatric: Normal mood and affect      Diagnostic Study Results     Labs -     Recent Results (from the past 48 hour(s))   EKG, 12 LEAD, INITIAL    Collection Time: 10/07/19 12:30 PM   Result Value Ref Range    Ventricular Rate 135 BPM    Atrial Rate 270 BPM    QRS Duration 94 ms    Q-T Interval 322 ms    QTC Calculation (Bezet) 483 ms    Calculated P Axis -109 degrees    Calculated R Axis 90 degrees    Calculated T Axis -24 degrees    Diagnosis       Atrial flutter with 2:1 AV conduction  Rightward axis  Marked ST abnormality, possible inferior subendocardial injury  Abnormal ECG  When compared with ECG of 23-SEP-2018 19:27,  Atrial flutter has replaced Sinus rhythm  Vent.  rate has increased BY  64 BPM  ST now depressed in Inferior leads  ST now depressed in Lateral leads  Nonspecific T wave abnormality now evident in Inferior leads     CBC WITH AUTOMATED DIFF    Collection Time: 10/07/19  1:00 PM   Result Value Ref Range    WBC 15.5 (H) 4.6 - 13.2 K/uL    RBC 4.95 4.20 - 5.30 M/uL    HGB 13.3 12.0 - 16.0 g/dL    HCT 39.3 35.0 - 45.0 %    MCV 79.4 74.0 - 97.0 FL    MCH 26.9 24.0 - 34.0 PG    MCHC 33.8 31.0 - 37.0 g/dL    RDW 13.5 11.6 - 14.5 %    PLATELET 093 446 - 919 K/uL    MPV 9.5 9.2 - 11.8 FL    NEUTROPHILS 88 (H) 40 - 73 %    LYMPHOCYTES 9 (L) 21 - 52 %    MONOCYTES 3 3 - 10 %    EOSINOPHILS 0 0 - 5 %    BASOPHILS 0 0 - 2 %    ABS. NEUTROPHILS 13.6 (H) 1.8 - 8.0 K/UL    ABS. LYMPHOCYTES 1.4 0.9 - 3.6 K/UL    ABS. MONOCYTES 0.5 0.05 - 1.2 K/UL    ABS. EOSINOPHILS 0.0 0.0 - 0.4 K/UL    ABS. BASOPHILS 0.0 0.0 - 0.1 K/UL    DF AUTOMATED     METABOLIC PANEL, COMPREHENSIVE    Collection Time: 10/07/19  1:00 PM   Result Value Ref Range    Sodium 137 136 - 145 mmol/L    Potassium 3.2 (L) 3.5 - 5.5 mmol/L    Chloride 101 100 - 111 mmol/L    CO2 29 21 - 32 mmol/L    Anion gap 7 3.0 - 18 mmol/L    Glucose 135 (H) 74 - 99 mg/dL    BUN 18 7.0 - 18 MG/DL    Creatinine 1.12 0.6 - 1.3 MG/DL    BUN/Creatinine ratio 16 12 - 20      GFR est AA 59 (L) >60 ml/min/1.73m2    GFR est non-AA 49 (L) >60 ml/min/1.73m2    Calcium 9.7 8.5 - 10.1 MG/DL    Bilirubin, total 0.5 0.2 - 1.0 MG/DL    ALT (SGPT) 21 13 - 56 U/L    AST (SGOT) 16 10 - 38 U/L    Alk.  phosphatase 119 (H) 45 - 117 U/L    Protein, total 8.3 (H) 6.4 - 8.2 g/dL    Albumin 4.3 3.4 - 5.0 g/dL    Globulin 4.0 2.0 - 4.0 g/dL    A-G Ratio 1.1 0.8 - 1.7     CARDIAC PANEL,(CK, CKMB & TROPONIN)    Collection Time: 10/07/19  1:00 PM   Result Value Ref Range     26 - 192 U/L    CK - MB 1.3 <3.6 ng/ml    CK-MB Index 0.7 0.0 - 4.0 %    Troponin-I, QT <0.02 0.0 - 0.045 NG/ML   LIPASE    Collection Time: 10/07/19  1:30 PM   Result Value Ref Range    Lipase 368 73 - 393 U/L   POC LACTIC ACID    Collection Time: 10/07/19  2:20 PM   Result Value Ref Range    Lactic Acid (POC) 1.78 0.40 - 2.00 mmol/L   URINALYSIS W/ RFLX MICROSCOPIC    Collection Time: 10/07/19  2:26 PM   Result Value Ref Range    Color YELLOW      Appearance CLEAR      Specific gravity 1.006 1.005 - 1.030      pH (UA) 7.5 5.0 - 8.0      Protein NEGATIVE  NEG mg/dL    Glucose NEGATIVE  NEG mg/dL    Ketone NEGATIVE  NEG mg/dL    Bilirubin NEGATIVE  NEG      Blood NEGATIVE  NEG      Urobilinogen 0.2 0.2 - 1.0 EU/dL    Nitrites NEGATIVE  NEG      Leukocyte Esterase NEGATIVE  NEG         Radiologic Studies -   CT HEAD WO CONT   Final Result   IMPRESSION:    1. Unremarkable noncontrast head CT. No evidence of acute intracranial   hemorrhage or focal mass effect. CT CHEST ABD PELV W CONT   Final Result   IMPRESSION:      1. Intrahepatic and extrahepatic biliary ductal and intracholecystic air without   pneumoperitoneum are evident intraluminal gallstones or bowel obstruction. Features are nonspecific and may be present post ERCP for weeks to months in   particular if there was associated sphincterotomy. Differential diagnosis   includes cholangitis, choledochoduodenal fistula. Please correlate clinically. 2. Minimal cholecystolithiasis. No constellation of peripancreatic fluid   collections or edema to suggest pancreatitis      3. Oval 8 cm adnexal likely right ovarian large but simple appearing cyst. Major   differential diagnosis includes large physiologic cyst or endometrioma, or   benign/low-grade cystic ovarian neoplasm. No ascites or lymphadenopathy. 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 5. No evidence of solid organ injury or hemoperitoneum. 6. Mosaic attenuation pattern nonspecific, may reflect small airways disease. No   evidence of mediastinal hematoma or pneumothorax/hemothorax. 7. Other minor and/or incidental ancillary findings as above           CT Results  (Last 48 hours)               10/07/19 1627  CT HEAD WO CONT Final result    Impression:  IMPRESSION:    1. Unremarkable noncontrast head CT. No evidence of acute intracranial   hemorrhage or focal mass effect. Narrative:  CT HEAD W/O CONTRAST       History:   Low-grade blunt injury possible intracranial hemorrhage       CT Technique:       Serial axial noncontrast head CT was performed from base of skull to vertex. Coronal and sagittal reformats performed, as needed. Dose reduction technique:    Automated exposure control, adjustment of the mAs and/or kVp according to the   patient 's size, and iterative reconstruction techniques were used. Specifics   for this study were placed by technologist staff into the patient's electronic   medical record. Approximate dose, if determined, reference air kerma:   Digital imaging and Communication in Medicine [DICOM] format image data are   available to nonaffiliated external healthcare facilities or entities on a   secure media free reciprocally searchable basis, with patient authorization, for   at least a 12 month period after this study. If not available when requested,   the health care system, which is responsible for storage archival and delivery,   should be contacted directly. No prior head exam available. CT Findings:   BRAIN [CEREBRUM/POSTERIOR FOSSA]:   Supratentorial  parenchyma appears of normal density. There is no discrete   intracerebral hematoma, extra-axial collection, focal mass effect, or midline   shift. Posterior fossa parenchyma appears grossly unremarkable. EXTRAAXIAL AND MENINGES:   Cerebral  and cerebellar sulci and the ventricles appear within normal limits in   size and configuration for patient age. There is minimal intracranial atherosclerosis       CALVARIUM:       The visible bony calvarium appears unremarkable. MISC:    The visible included paranasal sinuses and petrous mastoid air cells appear   well developed and aerated.                      10/07/19 5277 CT CHEST ABD PELV W CONT Final result    Impression:  IMPRESSION:       1. Intrahepatic and extrahepatic biliary ductal and intracholecystic air without   pneumoperitoneum are evident intraluminal gallstones or bowel obstruction. Features are nonspecific and may be present post ERCP for weeks to months in   particular if there was associated sphincterotomy. Differential diagnosis   includes cholangitis, choledochoduodenal fistula. Please correlate clinically. 2. Minimal cholecystolithiasis. No constellation of peripancreatic fluid   collections or edema to suggest pancreatitis       3. Oval 8 cm adnexal likely right ovarian large but simple appearing cyst. Major   differential diagnosis includes large physiologic cyst or endometrioma, or   benign/low-grade cystic ovarian neoplasm. No ascites or lymphadenopathy. 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 5. No evidence of solid organ injury or hemoperitoneum. 6. Mosaic attenuation pattern nonspecific, may reflect small airways disease. No   evidence of mediastinal hematoma or pneumothorax/hemothorax. 7. Other minor and/or incidental ancillary findings as above           Narrative:      CT CHEST ABDOMEN AND PELVIS WITH  CONTRAST       History:  Blunt trauma injury       CT technique:     Serial axial helical IV only contrast CT performed from the lung apex to liver   dome for chest, liver dome to iliac crest for abdomen, and from iliac crest to   pubis for pelvis CT. Low osmolar non ionic iodinated IV contrast was   administered. PO oral contrast was administered.]         Sagittal and coronal reformats were performed from axial helical data. Dose reduction technique:    Automated exposure control, adjustment of the mAs and/or kVp according to the   patient 's size, and iterative reconstruction techniques were used. Specifics   for this study were placed by technologist staff into the patient's electronic   medical record. Approximate dose, if determined, reference air kerma:   Digital imaging and Communication in Medicine [DICOM] format image data are   available to nonaffiliated external healthcare facilities or entities on a   secure media free reciprocally searchable basis, with patient authorization, for   at least a 12 month period after this study. If not available when requested,   the health care system, which is responsible for storage archival and delivery,   should be contacted directly. No prior exams available. CT findings:       CT Chest       LUNGS:   Parenchyma:   Mosaic attenuation pattern perhaps from small airways disease. There is no   evident mass, consolidation, ground glass opacity, or diffuse or nodular   interstitial thickening. PLEURA:   No evident pleural effusion,   pneumothorax,  or pleural based mass. AIRWAYS:   Central airways are patent. MEDIASTINUM:   There is mild aortic/coronary atherosclerosis. Great vessels are  of normal   caliber. Borderline cardiomegaly. No pericardial effusion. Few incidental <1 cm   tiny hypodensities in the thyroid. LYMPH NODES:   No evident lymphadenopathy  or mass. BONES/OSSEOUS: No acute or aggressive abnormalities identified. Probable bone island, left fourth rib sclerotic opacity. Mild multilevel   spondylosis/degenerative disc disease       CT Abdomen       SOLID UPPER ABDOMINAL ORGANS:       LIVER, BILIARY:   Minimal intrahepatic and central biliary air without gross ductal dilatation. Otherwise unremarkable liver. Extrahepatic common bile duct normal caliber at   0.6 cm without definitive filling defect Small component of nondependent   intracholecystic air. Few punctate hyperdense foci along the dependent   gallbladder wall either very tiny calculi or small polyps. No generalized thick   wall or pericholecystic fluid. PANCREAS:   Unremarkable. No peripancreatic fluid or main ductal dilatation. SPLEEN:   Unremarkable. RETROPERITONEUM:       ADRENALS:   Right: Unremarkable. Left: Oval indeterminate 1 cm relatively hypoenhancing nodule. No noncontrast   exam       KIDNEYS/URETERS/BLADDER:   There are several small < 1 cm and /or punctate hypodensities in both kidneys,   too small to characterize accurately by density criteria, statistically likely   benign and probably cysts. Otherwise:   Right: Unremarkable. Left: Unremarkable. No upper urinary tract dilatation. Urinary bladder, see pelvic organs below. LYMPH NODES:   No evident retroperitoneal lymphadenopathy  or mass. GASTROINTESTINAL TRACT:       No perihepatic ascites. No evident pneumoperitoneum. [place] Plaster Abdominal /pelvic wall is unremarkable. Gastrointestinal tract is less than optimally evaluated on this exam, as   unprepped, not well distended, and without oral contrast.  No visible luminal   calculus       Otherwise GI Tract appears grossly unremarkable, including appendix. VASCULATURE:       There is mild aortoiliac and splanchnic and renal origin atherosclerosis. Abdominal aorta is of normal caliber. No aneurysm or periaortic hematoma. Circumaortic left renal vein variant       PELVIC ORGANS:       Urinary bladder appears  is within normal limits, for less than optimal degree   of distension. Mild extrinsic mass effect on dome from overlying cystic lesion        There is an oval smooth walled simple appearing moderately large 8 x 8.5 x 8   unilocular cyst with fluid density content projecting from the expected position   of the right ovary immediately ventral to the uterus. No thick nodular wall   projections or septal components or evident internal calcification. Unremarkable   left ovary. No evidence of adnexal solid mass. Uterus has an oval 3 x 2 cm   fat-containing posterior intramural density and additional small punctate   calcifications in the myometrium.        No free pelvic fluid. BONES:   Axial skeleton appears within normal limits with/without several punctate   scattered sclerotic foci typical of bone islands. MISC:                   CXR Results  (Last 48 hours)    None          Medications given in the ED-  Medications   labetalol (NORMODYNE;TRANDATE) 20 mg/4 mL (5 mg/mL) injection 20 mg (20 mg IntraVENous Given 10/7/19 1324)   losartan/hydroCHLOROthiazide (HYZAAR) 100/25 mg ( Oral Given 10/7/19 1531)   acetaminophen (OFIRMEV) infusion 1,000 mg (0 mg IntraVENous IV Completed 10/7/19 1555)         Medical Decision Making   I am the first provider for this patient. I reviewed the vital signs, available nursing notes, past medical history, past surgical history, family history and social history. Vital Signs-Reviewed the patient's vital signs. Pulse Oximetry Analysis - 100% on room air     Cardiac Monitor:  Rate: 137 bpm  Rhythm: atrial flutter    EKG interpretation: (Preliminary)  EKG read by Dr. Aisha Ye    EKG rate 135 atrial flutter with 2-1 AV conduction, rightward axis, nonspecific ST changes in the inferior leads    Records Reviewed: Nursing Notes and Old Medical Records    Provider Notes (Medical Decision Making): Rosa Cagle is a 75 y.o. female presenting for head injury. Patient's vital signs noted to be abnormal in triage with blood pressure above 385 systolic and heart rate elevated appearing as atrial flutter. Procedures:  Procedures    ED Course:   Laboratory work including CT and labetalol was given    After labetalol the patients bp improved but her heart rate continued to be elevated. After some time in the ED her heart rate spontaneously improved from 120-130s to 80-90 sinus rhythm. Family at bedside states this happens to her often as she becomes very nervous at hospitals. CT was delayed due to procedure done by IR. CT showed no acute process in the head.  CT chest and abdomen pelvis was added due to the patients persistent tachycardia and mild leukocytosis. There was noted nonspecific air in the biliary tree. Repeat examination of the patient shows no fever, no abdominal pain, comfortable appearing, with normal vital signs. I discussed with the family the labs and imaging. They stated they are aware of her gallbladder stones and issues. They stated the patient is feeling much better and anxious to go home. I discussed with them the need to follow up with their pcp and/or general surgeon for evaluation and possible need for gallbladder to be removed. I stressed to come back to the ED if symptoms worsen. They understand and agree to plan. Diagnosis and Disposition       DISCHARGE NOTE:    Marika Soto's  results have been reviewed with her. She has been counseled regarding her diagnosis, treatment, and plan. She verbally conveys understanding and agreement of the signs, symptoms, diagnosis, treatment and prognosis and additionally agrees to follow up as discussed. She also agrees with the care-plan and conveys that all of her questions have been answered. I have also provided discharge instructions for her that include: educational information regarding their diagnosis and treatment, and list of reasons why they would want to return to the ED prior to their follow-up appointment, should her condition change. She has been provided with education for proper emergency department utilization. CLINICAL IMPRESSION:    1. Injury of head, initial encounter    2. Essential hypertension    3. Leukocytosis, unspecified type    4. Tachyarrhythmia    5. Feeling anxious        PLAN:  1. D/C Home  2. Discharge Medication List as of 10/7/2019  5:26 PM        3.    Follow-up Information     Follow up With Specialties Details Why Contact Info    Charlie Caldwell MD Internal Medicine Schedule an appointment as soon as possible for a visit in 2 days As needed, If symptoms worsen [place] [address]      [facility] EMERGENCY DEPT Emergency Medicine Go to  If symptoms worsen [address] [id number]  [phone]        _______________________________      Please note that this dictation was completed with [employer], the computer voice recognition software. Quite often unanticipated grammatical, syntax, homophones, and other interpretive errors are inadvertently transcribed by the computer software. Please disregard these errors. Please excuse any errors that have escaped final proofreading.

## 2019-10-08 PROBLEM — R45.89 FEELING ANXIOUS: Status: ACTIVE | Noted: 2019-10-08

## 2019-10-08 PROBLEM — R00.0 TACHYARRHYTHMIA: Status: ACTIVE | Noted: 2019-10-08

## 2019-10-10 LAB
ATRIAL RATE: 270 BPM
CALCULATED P AXIS, ECG09: -109 DEGREES
CALCULATED R AXIS, ECG10: 90 DEGREES
CALCULATED T AXIS, ECG11: -24 DEGREES
DIAGNOSIS, 93000: NORMAL
Q-T INTERVAL, ECG07: 322 MS
QRS DURATION, ECG06: 94 MS
QTC CALCULATION (BEZET), ECG08: 483 MS
VENTRICULAR RATE, ECG03: 135 BPM

## 2020-10-29 NOTE — ROUTINE PROCESS
Bedside and Verbal shift change report given to ION Fields RN (oncoming nurse) by [employer] RN (offgoing nurse). Report included the following information SBAR, Kardex, Intake/Output and MAR. Pls see PCP's response. Routing to Reception.

## 2020-12-03 ENCOUNTER — HOSPITAL ENCOUNTER (EMERGENCY)
Age: 65
Discharge: HOME | End: 2020-12-04
Attending: EMERGENCY MEDICINE
Payer: MEDICARE

## 2020-12-03 ENCOUNTER — APPOINTMENT (OUTPATIENT)
Dept: GENERAL RADIOLOGY | Age: 65
End: 2020-12-03
Attending: EMERGENCY MEDICINE
Payer: MEDICARE

## 2020-12-03 VITALS
WEIGHT: 148 LBS | HEART RATE: 94 BPM | SYSTOLIC BLOOD PRESSURE: 183 MMHG | BODY MASS INDEX: 33.29 KG/M2 | OXYGEN SATURATION: 97 % | RESPIRATION RATE: 25 BRPM | TEMPERATURE: 97.8 F | HEIGHT: 56 IN | DIASTOLIC BLOOD PRESSURE: 82 MMHG

## 2020-12-03 DIAGNOSIS — R00.0 TACHYARRHYTHMIA: Primary | ICD-10-CM

## 2020-12-03 DIAGNOSIS — I10 ESSENTIAL HYPERTENSION: ICD-10-CM

## 2020-12-03 DIAGNOSIS — I47.1 SVT (SUPRAVENTRICULAR TACHYCARDIA) (HCC): ICD-10-CM

## 2020-12-03 LAB
ALBUMIN SERPL-MCNC: 4.4 G/DL (ref 3.4–5)
ALBUMIN/GLOB SERPL: 1.2 {RATIO} (ref 0.8–1.7)
ALP SERPL-CCNC: 85 U/L (ref 45–117)
ALT SERPL-CCNC: 19 U/L (ref 13–56)
ANION GAP SERPL CALC-SCNC: 8 MMOL/L (ref 3–18)
APPEARANCE UR: CLEAR
APTT PPP: 41.8 SEC (ref 23–36.4)
AST SERPL-CCNC: 14 U/L (ref 10–38)
ATRIAL RATE: 100 BPM
ATRIAL RATE: 107 BPM
ATRIAL RATE: 78 BPM
BACTERIA URNS QL MICRO: ABNORMAL /HPF
BASOPHILS # BLD: 0 K/UL (ref 0–0.1)
BASOPHILS NFR BLD: 0 % (ref 0–2)
BILIRUB SERPL-MCNC: 0.3 MG/DL (ref 0.2–1)
BILIRUB UR QL: NEGATIVE
BNP SERPL-MCNC: 479 PG/ML (ref 0–900)
BUN SERPL-MCNC: 29 MG/DL (ref 7–18)
BUN/CREAT SERPL: 20 (ref 12–20)
CALCIUM SERPL-MCNC: 9.1 MG/DL (ref 8.5–10.1)
CALCULATED P AXIS, ECG09: 67 DEGREES
CALCULATED P AXIS, ECG09: 68 DEGREES
CALCULATED R AXIS, ECG10: 71 DEGREES
CALCULATED R AXIS, ECG10: 79 DEGREES
CALCULATED R AXIS, ECG10: 85 DEGREES
CALCULATED T AXIS, ECG11: -21 DEGREES
CALCULATED T AXIS, ECG11: 39 DEGREES
CALCULATED T AXIS, ECG11: 48 DEGREES
CHLORIDE SERPL-SCNC: 104 MMOL/L (ref 100–111)
CK MB CFR SERPL CALC: 0.7 % (ref 0–4)
CK MB CFR SERPL CALC: 1 % (ref 0–4)
CK MB SERPL-MCNC: 1.9 NG/ML (ref 5–25)
CK MB SERPL-MCNC: 2 NG/ML (ref 5–25)
CK SERPL-CCNC: 194 U/L (ref 26–192)
CK SERPL-CCNC: 272 U/L (ref 26–192)
CO2 SERPL-SCNC: 27 MMOL/L (ref 21–32)
COLOR UR: YELLOW
CREAT SERPL-MCNC: 1.47 MG/DL (ref 0.6–1.3)
D DIMER PPP FEU-MCNC: 0.28 UG/ML(FEU)
DIAGNOSIS, 93000: NORMAL
DIFFERENTIAL METHOD BLD: ABNORMAL
EOSINOPHIL # BLD: 0.1 K/UL (ref 0–0.4)
EOSINOPHIL NFR BLD: 1 % (ref 0–5)
EPITH CASTS URNS QL MICRO: ABNORMAL /LPF (ref 0–5)
ERYTHROCYTE [DISTWIDTH] IN BLOOD BY AUTOMATED COUNT: 13.7 % (ref 11.6–14.5)
GLOBULIN SER CALC-MCNC: 3.8 G/DL (ref 2–4)
GLUCOSE SERPL-MCNC: 223 MG/DL (ref 74–99)
GLUCOSE UR STRIP.AUTO-MCNC: 100 MG/DL
GRAN CASTS URNS QL MICRO: ABNORMAL /LPF
HCT VFR BLD AUTO: 34.4 % (ref 35–45)
HGB BLD-MCNC: 11.6 G/DL (ref 12–16)
HGB UR QL STRIP: NEGATIVE
INR PPP: 1 (ref 0.8–1.2)
KETONES UR QL STRIP.AUTO: NEGATIVE MG/DL
LEUKOCYTE ESTERASE UR QL STRIP.AUTO: NEGATIVE
LYMPHOCYTES # BLD: 3.2 K/UL (ref 0.9–3.6)
LYMPHOCYTES NFR BLD: 23 % (ref 21–52)
MAGNESIUM SERPL-MCNC: 1.7 MG/DL (ref 1.6–2.6)
MCH RBC QN AUTO: 27.4 PG (ref 24–34)
MCHC RBC AUTO-ENTMCNC: 33.7 G/DL (ref 31–37)
MCV RBC AUTO: 81.1 FL (ref 74–97)
MONOCYTES # BLD: 0.9 K/UL (ref 0.05–1.2)
MONOCYTES NFR BLD: 7 % (ref 3–10)
NEUTS SEG # BLD: 9.5 K/UL (ref 1.8–8)
NEUTS SEG NFR BLD: 69 % (ref 40–73)
NITRITE UR QL STRIP.AUTO: NEGATIVE
P-R INTERVAL, ECG05: 162 MS
P-R INTERVAL, ECG05: 166 MS
PH UR STRIP: 5.5 [PH] (ref 5–8)
PLATELET # BLD AUTO: 408 K/UL (ref 135–420)
PMV BLD AUTO: 9.9 FL (ref 9.2–11.8)
POTASSIUM SERPL-SCNC: 3.3 MMOL/L (ref 3.5–5.5)
PROT SERPL-MCNC: 8.2 G/DL (ref 6.4–8.2)
PROT UR STRIP-MCNC: 30 MG/DL
PROTHROMBIN TIME: 13 SEC (ref 11.5–15.2)
Q-T INTERVAL, ECG07: 292 MS
Q-T INTERVAL, ECG07: 338 MS
Q-T INTERVAL, ECG07: 362 MS
QRS DURATION, ECG06: 76 MS
QRS DURATION, ECG06: 80 MS
QRS DURATION, ECG06: 84 MS
QTC CALCULATION (BEZET), ECG08: 451 MS
QTC CALCULATION (BEZET), ECG08: 466 MS
QTC CALCULATION (BEZET), ECG08: 469 MS
RBC # BLD AUTO: 4.24 M/UL (ref 4.2–5.3)
RBC #/AREA URNS HPF: ABNORMAL /HPF (ref 0–5)
SODIUM SERPL-SCNC: 139 MMOL/L (ref 136–145)
SP GR UR REFRACTOMETRY: 1.02 (ref 1–1.03)
TROPONIN I SERPL-MCNC: 0.02 NG/ML (ref 0–0.04)
TROPONIN I SERPL-MCNC: <0.02 NG/ML (ref 0–0.04)
TSH SERPL DL<=0.05 MIU/L-ACNC: 2.76 UIU/ML (ref 0.36–3.74)
UROBILINOGEN UR QL STRIP.AUTO: 0.2 EU/DL (ref 0.2–1)
VENTRICULAR RATE, ECG03: 100 BPM
VENTRICULAR RATE, ECG03: 107 BPM
VENTRICULAR RATE, ECG03: 155 BPM
WBC # BLD AUTO: 13.8 K/UL (ref 4.6–13.2)
WBC URNS QL MICRO: ABNORMAL /HPF (ref 0–5)

## 2020-12-03 PROCEDURE — 85379 FIBRIN DEGRADATION QUANT: CPT

## 2020-12-03 PROCEDURE — 93005 ELECTROCARDIOGRAM TRACING: CPT

## 2020-12-03 PROCEDURE — 74011250636 HC RX REV CODE- 250/636: Performed by: EMERGENCY MEDICINE

## 2020-12-03 PROCEDURE — 85025 COMPLETE CBC W/AUTO DIFF WBC: CPT

## 2020-12-03 PROCEDURE — 81001 URINALYSIS AUTO W/SCOPE: CPT

## 2020-12-03 PROCEDURE — 83880 ASSAY OF NATRIURETIC PEPTIDE: CPT

## 2020-12-03 PROCEDURE — 83735 ASSAY OF MAGNESIUM: CPT

## 2020-12-03 PROCEDURE — 96374 THER/PROPH/DIAG INJ IV PUSH: CPT

## 2020-12-03 PROCEDURE — 85730 THROMBOPLASTIN TIME PARTIAL: CPT

## 2020-12-03 PROCEDURE — 71045 X-RAY EXAM CHEST 1 VIEW: CPT

## 2020-12-03 PROCEDURE — 80053 COMPREHEN METABOLIC PANEL: CPT

## 2020-12-03 PROCEDURE — 99285 EMERGENCY DEPT VISIT HI MDM: CPT

## 2020-12-03 PROCEDURE — 74011250637 HC RX REV CODE- 250/637: Performed by: EMERGENCY MEDICINE

## 2020-12-03 PROCEDURE — 82550 ASSAY OF CK (CPK): CPT

## 2020-12-03 PROCEDURE — 84443 ASSAY THYROID STIM HORMONE: CPT

## 2020-12-03 PROCEDURE — 85610 PROTHROMBIN TIME: CPT

## 2020-12-03 RX ORDER — ATENOLOL 50 MG/1
100 TABLET ORAL
Status: COMPLETED | OUTPATIENT
Start: 2020-12-03 | End: 2020-12-03

## 2020-12-03 RX ORDER — ADENOSINE 3 MG/ML
6 INJECTION, SOLUTION INTRAVENOUS
Status: DISCONTINUED | OUTPATIENT
Start: 2020-12-03 | End: 2020-12-03

## 2020-12-03 RX ORDER — LABETALOL HCL 20 MG/4 ML
20 SYRINGE (ML) INTRAVENOUS
Status: COMPLETED | OUTPATIENT
Start: 2020-12-03 | End: 2020-12-03

## 2020-12-03 RX ORDER — LABETALOL HCL 20 MG/4 ML
20 SYRINGE (ML) INTRAVENOUS
Status: DISCONTINUED | OUTPATIENT
Start: 2020-12-03 | End: 2020-12-04 | Stop reason: HOSPADM

## 2020-12-03 RX ORDER — LABETALOL HYDROCHLORIDE 5 MG/ML
20 INJECTION, SOLUTION INTRAVENOUS
Status: DISCONTINUED | OUTPATIENT
Start: 2020-12-03 | End: 2020-12-03 | Stop reason: RX

## 2020-12-03 RX ADMIN — ATENOLOL 100 MG: 50 TABLET ORAL at 22:56

## 2020-12-03 RX ADMIN — SODIUM CHLORIDE 1000 ML: 900 INJECTION, SOLUTION INTRAVENOUS at 21:20

## 2020-12-03 RX ADMIN — LABETALOL HYDROCHLORIDE 20 MG: 5 INJECTION, SOLUTION INTRAVENOUS at 20:53

## 2020-12-04 NOTE — ED NOTES
Pt D/c'd home to take the remainder of her evening meds. Son at bedside to assist his mother. Also to F/u per orders. VSS and pt calmer and pain free. Home AOx4, ambulatory with a steady gait  I have reviewed discharge instructions with the patient and caregiver. The patient and caregiver verbalized understanding.   Follow-up Information     Follow up With Specialties Details Why Contact Info    Radha Garcia MD Internal Medicine Schedule an appointment as soon as possible for a visit in 2 days  [facility]   [address] [id number]      Toyin Zepeda MD Cardiology Schedule an appointment as soon as possible for a visit in 2 days  [address]  [facility] [address] [place] [id number] [phone]      [facility] EMERGENCY DEPT Emergency Medicine  As needed if symptoms worsen [address] [place] [address]  [phone]

## 2020-12-04 NOTE — DISCHARGE INSTRUCTIONS
Patient Education        Leana Diane: Instrucciones de cuidado  Cardiac Arrhythmia: Care Instructions  Instrucciones de cuidado    Corazon arritmia cardíaca es corazon alteración en el ritmo normal del corazón. Randle corazón podría latir demasiado rápido o demasiado despacio, o hacerlo con un ritmo irregular o interrumpido. Un cambio en el ritmo cardíaco se podría sentir ana un latido muy yesenia o un aleteo en el pecho. Un problema grave de ritmo cardíaco puede evitar que el organismo reciba la dionne que necesita. [place] puede causar falta de aire, aturdimiento y [place]. Podría sobia medicamentos para tratar randle afección. Randle médico podría recomendarle un marcapasos o corazon ablación con catéter para destruir pequeñas partes de randle corazón que causan el problema con el ritmo. Un desfibrilador cardioversor implantable (ICD, por jasmyn siglas en inglés) es otro tratamiento posible. Un ICD es un dispositivo que emite un choque eléctrico al corazón para que vuelva a randle ritmo normal.  La atención de seguimiento es corazon parte clave de randle tratamiento y seguridad. Asegúrese de hacer y acudir a todas las citas, y llame a randle médico si está teniendo problemas. También es corazon buena idea saber los resultados de jasmyn exámenes y mantener corazon lista de los medicamentos que marc. ¿Cómo puede cuidarse en el hogar? Cuidado general    · Sea jesse con los medicamentos. Azure jasmyn medicamentos exactamente ana le fueron recetados. Llame a randle médico si cheyanne estar teniendo un problema con randle medicamento. Recibirá más [employer] medicamentos específicos recetados por randle médico.     · Si le implantaron un marcapasos o un ICD, recibirá corazon hoja de información sobre el dispositivo.     · Use un brazalete o collar de alerta médica que indique que tiene un ritmo cardíaco anormal. Estos productos pueden comprarse en la mayoría de las [place].    Cambios en el estilo de jnuior    · Siga corazon dieta saludable para el corazón.     · Jorge Chacon un peso saludable. Baje de peso si lo necesita.     · Evite la nicotina, el exceso de alcohol y las drogas ilegales (Carmela Sprang y cocaína). Asimismo, duerma lo suficiente y no coma en exceso.     · Pregúntele a randle médico si puede sobia medicamentos de venta willow, ana descongestionantes. Estos pueden acelerar jasmyn latidos cardíacos.     · Hable con randle médico acerca de cualquier límite a jasmyn actividades, ana conducir o actividades en que usted use herramientas eléctricas o escaleras. Actividad    · Comience con Yahaira Conquest si randle médico lo autoriza. Aun corazon pequeña cantidad de ejercicio le ayudará a fortalecerse, tener más energía y manejar el estrés.     · James ejercicio con regularidad. Trate de hacerlo por 30 minutos la mayoría de los días de la [place]. Pregúntele a randle médico qué nivel de ejercicio es seguro para usted. Si no es probable que la [employer] cause problemas de [place], [place] no tenga restricciones sobre el tipo o el nivel de actividad que puede hacer. [place] vez quiera caminar, nadar, montar en bicicleta o hacer otras actividades.     · Cuando james ejercicio, esté alerta a señales de que randle corazón se esté esforzando demasiado. Si no puede hablar mientras hace ejercicio, quiere decir que se está esforzando demasiado. Si siente falta de aire, mareos o dolor de [place], siéntese y descanse.     · Revísese diariamente el pulso. Con la lam de la mano hacia arriba, coloque dos dedos sobre la arteria de la amy en línea con el pulgar. Si el pulso es irregular, hable con randle médico.   ¿Cuándo debe pedir ayuda? Llame al 911 en cualquier momento que considere que necesita atención de [place]. Por ejemplo, llame si:    · Tiene síntomas de insuficiencia cardíaca repentina. Estos pueden incluir:  ? Sandra Caras para respirar. ? Latidos cardíacos rápidos o irregulares. ? Toser mucosidad rosácea y espumosa.   ? Haberse desmayado.     · Tiene señales de un ataque cerebral. Estas incluyen:  ? Entumecimiento, debilidad o parálisis repentinos en la beronica, el brazo o la pierna, sobre todo si ocurre en un solo lado del cuerpo. ? Problemas nuevos para caminar o mantener el equilibrio. ? Cambios repentinos en la vista. ? Babeo o habla poco apolinar. ? Problemas nuevos para hablar o comprender frases sencillas, o sentirse confuso. ? Un dolor de shaunna intenso y repentino, distinto a los earnestine de Rolena Bibles. Llame a randle médico ahora mismo o busque atención médica inmediata si:    · Tiene síntomas nuevos o diferentes de insuficiencia cardíaca, tales ana:  ? [address] ? Pauletta Dellen o peor en las piernas, los tobillos o los pies. ? Aumento repentino de [place], [place] de 2 a 3 libras (0.9 kg a 1.4 kg) en un día o 5 libras (2.3 kg) en corazon semana. (Randle médico podría sugerirle unos límites diferentes de aumento de [place]). ? Sentirse mareado o aturdido, o ana que se va a desmayar. ? Sentirse tan cansado o débil que no puede hacer jasmyn actividades habituales. ? No dormir umair. La falta de aire lo despierta por la noche. Usted necesita almohadas adicionales para elevar la parte superior del cuerpo y respirar con más facilidad. Preste especial atención a los cambios en randle al y asegúrese de comunicarse con randle médico si:    · No mejora ana se esperaba. ¿Dónde puede encontrar más información en inglés? Vaya a [email or web address]  Jessica Maharaj [id number] en la búsqueda para aprender más acerca de \"Arritmia cardíaca: Instrucciones de cuidado. \"  Revisado: 16 janes, 7205               GRBXJPQ del contenido: 12.6  © 1063-1981 [employer]. Las instrucciones de cuidado fueron adaptadas bajo licencia por [employer] (which disclaims liability or warranty for this information). Si usted tiene [place] afección médica o sobre estas instrucciones, siempre pregunte a randle profesional de al.  [employer] niega toda garantía o responsabilidad por randle uso de esta información. Patient Education        Presión arterial edson: Instrucciones de cuidado  High Blood Pressure: Care Instructions  Instrucciones de cuidado     Si randle presión arterial suele ser superior a 130/80, usted tiene presión arterial edson o hipertensión. [place] significa que el número de [place] es 130 o más alto o que el número de abajo es [de-identified] o [address], o ambas cosas. A pesar de lo que mucha gente cheyanne, la hipertensión no suele causar dolor de shaunna ni provocar mareos o aturdimiento. Generalmente, no presenta síntomas. Sin embargo, aumenta el riesgo de ataque al corazón, ataque cerebral, y daños en los riñones o en los ojos. A mayor presión arterial, mayor riesgo. Randle médico le dará corazon meta para randle presión arterial. Randle meta se basará en randle al y randle edad. Los cambios de estilo de junior, ana alimentarse en forma saludable y [place], siempre son importantes para ayudarle a bajar la presión arterial. También podría sobia medicamentos para lograr randle meta para la presión arterial.  La atención de seguimiento es corazon parte clave de randle tratamiento y seguridad. Asegúrese de hacer y acudir a todas las citas, y llame a randle médico si está teniendo problemas. También es corazon buena idea saber los resultados de jasmyn exámenes y mantener corazon lista de los medicamentos que marc. ¿Cómo puede cuidarse en el hogar? Tratamiento médico  · Si bruce de sobia jasmyn medicamentos, randle presión arterial volverá a subir. Es posible que deba sobia un tipo de [place], o más de [place], para reducir la presión arterial. Sea jesse con los medicamentos. [place] randle medicamento exactamente ana se lo hayan indicado. Llame a randle médico si cheyanne estar teniendo problemas con randle medicamento. · Hable con randle médico antes de empezar a sobia [facility].  La aspirina puede ayudar a determinadas personas a reducir randle riesgo de tener un ataque cardíaco o un ataque cerebral. Win sobia aspirina no es adecuado para todo [address], debido a que puede causar sangrado grave. · Visite a randle médico periódicamente. Usted podría necesitar consultar a randle médico con más frecuencia al principio o hasta que se reduzca randle presión arterial.  · Si usted está tomando medicamentos para la presión arterial, hable con randle médico antes de sobia medicamentos descongestionantes o antiinflamatorios, ana ibuprofeno. Algunos de estos medicamentos pueden elevar la presión arterial.  · Aprenda a revisarse la presión arterial en randle hogar. Cambios en el estilo de junior   · Mantenga un peso saludable. [place] es particularmente importante si aumenta de peso en la izabela de la cintura. Bajar solo 10 libras (4.5 kg) puede ayudarle a reducir randle presión arterial.  · Sal más ejercicios si randle médico se lo recomienda. Caminar es corazon buena opción. Poco a poco, aumente la distancia que [employer]. Intente hacer por lo menos 30 minutos de ejercicio la mayoría de los días de la [place]. También podría nadar, montar en bicicleta o hacer otras actividades. · No tome alcohol o limite la cantidad que jose. Hable con randle médico acerca de si puede sobia alcohol. · Trate de limitar la cantidad de sodio que consume a menos de 2,300 miligramos (mg) al día. Randle médico podría pedirle que trate de consumir menos de 1,500 mg al día. · Coma abundantes frutas (ana bananas [plátanos] y naranjas), verduras, legumbres, granos integrales y productos lácteos de bajo contenido de [place]. · Reduzca la cantidad de grasas saturadas en randle dieta. Los productos derivados de los [place], ana la [place], el queso y la carne, contienen grasas saturadas. El limitar estos alimentos podría ayudarle a bajar de peso y [place] reducir randle riesgo de corazon enfermedad cardíaca. · No fume. El hábito de fumar aumenta randle riesgo de ataque cerebral y ataque al corazón.  Si necesita ayuda para dejar de fumar, hable con randle médico sobre programas y medicamentos para dejar de fumar. Estos pueden aumentar jasmyn probabilidades de dejar el hábito para siempre. ¿Cuándo debe pedir ayuda? Llame al  911 en cualquier momento que considere que necesita atención de [place]. [place] puede significar que tiene síntomas que sugieren que randle presión arterial le está causando un problema cardíaco o vascular grave. Randle presión arterial podría ser superior a 180/120. Por ejemplo, llame al 911 si:    · Tiene síntomas de un ataque al corazón. Estos pueden incluir:  ? [place] en el pecho, o corazon sensación extraña en el pecho.  ? Sudoración. ? Falta de aire. ? Náuseas o vómito. ? Dolor, presión o corazon sensación extraña en la espalda, el cheo, la mandíbula, la parte superior del abdomen o en santa o ambos hombros o brazos. ? Aturdimiento o debilidad repentina. ? Latidos del corazón rápidos o irregulares.     · Tiene síntomas de un ataque cerebral. Estos pueden incluir:  ? Entumecimiento, hormigueo, debilidad o parálisis repentinos en la beronica, el brazo o la pierna, sobre todo en un solo lado del cuerpo. ? Cambios repentinos en la visión. ? Dificultades repentinas para hablar. ? Confusión repentina o dificultad súbita para comprender frases sencillas. ? Problemas repentinos para caminar o mantener el equilibrio. ? Dolor de [place] intenso y repentino, distinto de los earnestine de shaunna anteriores.     · Tiene un dolor intenso en la espalda o el abdomen. No espere a que la presión arterial baje por sí skylar. Obtenga ayuda de inmediato. Llame a randle médico ahora mismo o busque atención de inmediato si:    · Tiene la presión arterial mucho más edson de lo normal (ana 180/120 o más edson), grant no tiene síntomas.     · Taryn que la presión arterial edson le está causando síntomas, tales ana:  ? Dolor de shaunna intenso. ? Abhi Orr.    Preste especial atención a los cambios en randle al y asegúrese de comunicarse con randle médico si:    · Randle presión arterial está más edson de lo que randle médico recomienda al medírsela en al menos 2 ocasiones. [place] significa que el número de [place] es más alto o [address] de abajo es más alto, o ambas cosas.     · Taryn que podría estar teniendo efectos secundarios de los medicamentos para la presión arterial.   ¿Dónde puede encontrar más información en inglés? Randal Navarro a [email or web address]  Genny [id number] en la búsqueda para aprender más acerca de \"Presión arterial edson: Instrucciones de cuidado. \"  Revisado: 16 janes, 5389               LCOFAPSTACIE del contenido: 12.6  © 2006-2020 [employer]wise, Incorporated. Las instrucciones de cuidado fueron adaptadas bajo licencia por [employer] (which disclaims liability or warranty for this information). Si usted tiene [place] afección médica o sobre estas instrucciones, siempre pregunte a randle profesional de al. Healthwise, Incorporated niega toda garantía o responsabilidad por randle uso de esta información. Patient Education        Taquicardia supraventricular: Instrucciones de cuidado  Supraventricular Tachycardia: Care Instructions  Instrucciones de cuidado    La taquicardia supraventricular (SVT, por jasmyn siglas en inglés) significa que, de vez en cuando, el corazón late a corazon velocidad anormalmente rápida. Cambios en el sistema eléctrico del corazón causan estos ritmos acelerados. Podría sentir corazon sensación de aleteo en el pecho (palpitaciones) y tener el pulso acelerado. Cuando el corazón le late rápidamente, usted podría sentirse ansioso y aturdido, tener falta de aire o sentir molestias en el pecho. Es posible que randle médico le recete medicamentos para ayudar a desacelerar los latidos del corazón. Randle médico también puede sugerir que pruebe maniobras vagales cuando tenga un episodio de SVT. Estas son cosas, ana pujar, que podrían ayudar a desacelerar randle frecuencia cardíaca.  Pujar significa que usted trata de exhalar con los músculos abdominales grant no bruce salir aire por la nariz ni por la boca. Randle médico puede mostrarle cómo hacer maniobras vagales. [place] puede sugerirle que se recueste boca arriba para hacerlas. En algunos casos, se hace un tratamiento de cardioversión o un procedimiento conocido ana ablación por catéter para corregir la SVT. El médico podría pedirle que use un pequeño dispositivo electrónico tigre 1 o 2 días para monitorizar randle corazón. [place] dispositivo se llama un monitor Holter. La atención de seguimiento es corazon parte clave de randle tratamiento y seguridad. Asegúrese de hacer y acudir a todas las citas, y llame a randle médico si está teniendo problemas. También es corazon buena idea saber los resultados de jasmyn exámenes y mantener corazon lista de los medicamentos que marc. ¿Cómo puede cuidarse en el hogar? · Sea jesse con los medicamentos. [employer] medicamentos exactamente ana le fueron recetados. Llame a randle médico si cheyanne estar teniendo un problema con randle medicamento. Recibirá más [employer] medicamentos específicos recetados por randle médico.  · Si randle médico le mostró cómo hacer maniobras vagales, pruébelas cuando tenga un episodio. Estas maniobras incluyen pujar o ponerse corazon toalla mojada helada sobre la beronica. · Vigílese la afección llevando un diario de los episodios de SVT. Lléveselo a randle médico cuando asista a las visitas. ? Genny cómo de rápido o lento le latía el corazón. Para contar la frecuencia cardíaca:  § Coloque suavemente 2 dedos de la mano en el interior de la amy de la mano opuesta, debajo del pulgar. § Cuente los latidos tigre 30 segundos. § Luego, duplique el resultado para obtener el número de latidos por minuto. ? Escriba si randle ritmo cardíaco era regular o irregular. ? [employer] Welliko tenía. ? Genny a qué hora del día aparecieron los síntomas. ? Genny qué duración [employer]. ? Escriba qué estaba haciendo cuando comenzaron los síntomas.   ? Genny qué puede cesar ayudado a que los síntomas desaparecieran. · Limite o evite el alcohol y las bebidas que contienen cafeína si le provocan episodios. · No use descongestionantes de venta willow, grisel herbarios, pastillas para adelgazar ni pastillas estimulantes, los cuales suelen contener estimulantes. · No use drogas ilegales, ana la cocaína, el éxtasis o la [place], las cuales pueden acelerarle el ritmo cardíaco.  · No fume. Fumar puede empeorar esta enfermedad. Si necesita ayuda para dejar de fumar, hable con randle médico sobre programas y medicamentos para dejar de fumar. Estos pueden aumentar jasmyn probabilidades de dejar el hábito para siempre. · Esté alerta a la aparición de nuevos síntomas o al empeoramiento de los existentes, ana falta de aire, palpitaciones karine o cansancio inusual. Si le aparecen nuevos síntomas o si jasmyn síntomas empeoran, llame a randle médico.  ¿Cuándo debe pedir ayuda? Llame al 911 en cualquier momento que considere que necesita atención de [place]. Por ejemplo, llame si:    · Se desmayó (perdió el conocimiento).   · Le falta el aire. Llame a randle médico ahora mismo o busque atención médica inmediata si:    · Tiene latidos cardíacos rápidos.     · Está mareado o aturdido, o siente que se va a desmayar. Preste especial atención a los cambios en randle al y asegúrese de comunicarse con randle médico si:    · No mejora ana se esperaba. ¿Dónde puede encontrar más información en inglés? Davion Owen a [email or web address]  Genny [id number] en la búsqueda para aprender más acerca de \"Taquicardia supraventricular: Instrucciones de cuidado. \"  Revisado: 16 janes, 6215               JWTBSOABIEL del contenido: 12.6  © 2661-8577 [employer]. Las instrucciones de cuidado fueron adaptadas bajo licencia por [employer] (which disclaims liability or warranty for this information).  Si usted tiene [place] afección médica o sobre estas instrucciones, siempre pregunte a randle profesional de al. [facility] niega toda garantía o responsabilidad por randel uso de esta información.

## 2020-12-04 NOTE — ED TRIAGE NOTES
Pt arrives c/o elevated HR and BP. Pt denies chest pain and shortness of breath. Pt does c/o dental pain over the last several days.

## 2020-12-04 NOTE — ED PROVIDER NOTES
EMERGENCY DEPARTMENT HISTORY AND PHYSICAL EXAM    Date: 12/3/2020  Patient Name: Jj Medley    History of Presenting Illness     Chief Complaint   Patient presents with    Palpitations    Hypertension         History Provided By: Patient and Patient's Son    Additional History (Context):   Jj Medley is a 77 y.o. female with PMHX hypertension, who is predominantly Armenian-speaking, translated by her son at bedside presents to the emergency department via private vehicle C/O of rotations and elevated blood pressure while the patient was watching TV. Patient's son reports that his sister checked the patient's pulse and noticed that it was elevated. No history of A. fib or arrhythmia. Patient did endorse palpitations. States that the palpitations and chest pain has resolved. They deny any recent illness. Patient states that she only took one of her blood pressure medicine. Pt denies abdominal pain, nausea, fever, shortness of breath, and any other sxs or complaints. PCP: Romie Hylton MD    Current Facility-Administered Medications   Medication Dose Route Frequency Provider Last Rate Last Dose    [START ON 12/4/2020] losartan/hydroCHLOROthiazide (HYZAAR) 100/25 mg   Oral DAILY Adam Chang DO        labetaloL (NORMODYNE;TRANDATE) 20 mg/4 mL (5 mg/mL) injection 20 mg  20 mg IntraVENous NOW Adam Chang DO   Stopped at 12/03/20 6769     Current Outpatient Medications   Medication Sig Dispense Refill    hydrALAZINE (APRESOLINE) 25 mg tablet Take 1 Tab by mouth three (3) times daily. 90 Tab 0    lisinopril (PRINIVIL, ZESTRIL) 20 mg tablet Take 1 Tab by mouth daily. 30 Tab 0    atenolol (TENORMIN) 100 mg tablet Take 1 Tab by mouth two (2) times a day. 30 Tab 0    amLODIPine (NORVASC) 10 mg tablet Take 1 Tab by mouth daily.  30 Tab 0     Facility-Administered Medications Ordered in Other Encounters   Medication Dose Route Frequency Provider Last Rate Last Dose    esmolol (BREVIBLOC) 100 mg/10 mL (10 mg/mL) injection   IntraVENous PRN Graylon Dakin V, CRNA   10 mg at 09/19/18 1516    fentaNYL citrate (PF) injection   IntraVENous PRN Graylon Dakin V, CRNA   25 mcg at 09/19/18 1518    midazolam (VERSED) injection   IntraVENous PRN Graylon Dakin V, CRNA   1 mg at 09/19/18 1508    lidocaine (PF) (XYLOCAINE) 20 mg/mL (2 %) injection   IntraVENous PRN Graylon Dakin V, CRNA   40 mg at 09/19/18 1528    propofol (DIPRIVAN) 10 mg/mL injection   IntraVENous PRN Graylon Dakin V, CRNA   20 mg at 09/19/18 1623    rocuronium (ZEMURON) injection   IntraVENous PRN Graylon Dakin V, CRNA   10 mg at 09/19/18 1552    PHENYLephrine (KELSEY-SYNEPHRINE) 10,000 mcg in 0.9% sodium chloride 100 mL infusion  10,000 mcg IntraVENous CONTINUOUS Corie Gonzalezt, [place] V, CRNA   200 mcg at 09/19/18 1546    glucagon (GLUCAGEN) injection   IntraVENous PRN Holly García MD   1 mg at 09/19/18 1634    ondansetron (ZOFRAN) injection    PRN Holly García MD   4 mg at 09/19/18 1615    lactated Ringers infusion   IntraVENous CONTINUOUS Pauly Guajardor, DO        glycopyrrolate (ROBINUL) injection   IntraVENous PRN Da Sorensen, DO   0.4 mg at 09/19/18 1641    neostigmine methylsulfate (PROSTIGMINE) 1 mg/mL syringe   IntraVENous PRN Da Sorensen, DO   2 mg at 09/19/18 1641    ketorolac (TORADOL) injection   IntraVENous PRN Da Sorensen, DO   15 mg at 09/19/18 1645       Past History     Past Medical History:  Past Medical History:   Diagnosis Date    Hypertension        Past Surgical History:  History reviewed. No pertinent surgical history. Family History:  History reviewed. No pertinent family history.     Social History:  Social History     Tobacco Use    Smoking status: Never Smoker    Smokeless tobacco: Never Used   Substance Use Topics    Alcohol use: No    Drug use: Never       Allergies:  No Known Allergies      Review of Systems   Review of Systems Constitutional: Negative for chills and fever. HENT: Negative for congestion, ear pain, sinus pain and sore throat. Eyes: Negative for pain and visual disturbance. Respiratory: Positive for chest tightness. Negative for cough and shortness of breath. Cardiovascular: Positive for palpitations. Negative for chest pain and leg swelling. Gastrointestinal: Negative for abdominal pain, constipation, diarrhea, nausea and vomiting. Genitourinary: Negative for dysuria, hematuria, vaginal bleeding and vaginal discharge. Musculoskeletal: Negative for back pain and neck pain. Skin: Negative for rash and wound. Neurological: Negative for dizziness, tremors, weakness, light-headedness and numbness. All other systems reviewed and are negative. Physical Exam     Vitals:    12/03/20 2210 12/03/20 2220 12/03/20 2230 12/03/20 2240   BP: (!) 165/68 (!) 166/65 (!) 168/67 (!) 173/70   Pulse: 96 95 94 95   Resp: 23 17 22 17   Temp:       SpO2: 97% 97% 97% 97%   Weight:       Height:         Physical Exam    Nursing note and vitals reviewed    Constitutional: Elderly  female, no acute distress  Head: Normocephalic, Atraumatic  Eyes: Pupils are equal, round, and reactive to light, EOMI  Neck: Supple, non-tender  Cardiovascular: Tachycardic, no murmurs, rubs, or gallops, + 2 radial pulses bilaterally  Chest: Normal work of breathing and chest excursion bilaterally  Lungs: Clear to ausculation bilaterally, no wheezes, no rhonchi  Abdomen: Soft, non tender, non distended, normoactive bowel sounds  Back: No evidence of trauma or deformity  Extremities: No evidence of trauma or deformity, no LE edema.  No streaking erythema, vesicular lesions, ulcerations or bulla  Skin: Warm and dry, normal cap refill  Neuro: Alert and appropriate, CN intact, normal speech, moving all 4 extremities freely and symmetrically  Psychiatric: Normal mood and affect       Diagnostic Study Results     Labs -     Recent Results (from the past 12 hour(s))   EKG, 12 LEAD, INITIAL    Collection Time: 12/03/20  8:20 PM   Result Value Ref Range    Ventricular Rate 155 BPM    Atrial Rate 78 BPM    QRS Duration 76 ms    Q-T Interval 292 ms    QTC Calculation (Bezet) 469 ms    Calculated R Axis 79 degrees    Calculated T Axis -21 degrees    Diagnosis       Supraventricular tachycardia  RSR' or QR pattern in V1 suggests right ventricular conduction delay  Marked ST abnormality, possible inferior subendocardial injury  Abnormal ECG  Confirmed by Toni Carlson MD, [facility] ([id number]) on 12/3/2020 8:48:00 PM     CBC WITH AUTOMATED DIFF    Collection Time: 12/03/20  8:27 PM   Result Value Ref Range    WBC 13.8 (H) 4.6 - 13.2 K/uL    RBC 4.24 4.20 - 5.30 M/uL    HGB 11.6 (L) 12.0 - 16.0 g/dL    HCT 34.4 (L) 35.0 - 45.0 %    MCV 81.1 74.0 - 97.0 FL    MCH 27.4 24.0 - 34.0 PG    MCHC 33.7 31.0 - 37.0 g/dL    RDW 13.7 11.6 - 14.5 %    PLATELET 580 177 - 620 K/uL    MPV 9.9 9.2 - 11.8 FL    NEUTROPHILS 69 40 - 73 %    LYMPHOCYTES 23 21 - 52 %    MONOCYTES 7 3 - 10 %    EOSINOPHILS 1 0 - 5 %    BASOPHILS 0 0 - 2 %    ABS. NEUTROPHILS 9.5 (H) 1.8 - 8.0 K/UL    ABS. LYMPHOCYTES 3.2 0.9 - 3.6 K/UL    ABS. MONOCYTES 0.9 0.05 - 1.2 K/UL    ABS. EOSINOPHILS 0.1 0.0 - 0.4 K/UL    ABS. BASOPHILS 0.0 0.0 - 0.1 K/UL    DF AUTOMATED     METABOLIC PANEL, COMPREHENSIVE    Collection Time: 12/03/20  8:27 PM   Result Value Ref Range    Sodium 139 136 - 145 mmol/L    Potassium 3.3 (L) 3.5 - 5.5 mmol/L    Chloride 104 100 - 111 mmol/L    CO2 27 21 - 32 mmol/L    Anion gap 8 3.0 - 18 mmol/L    Glucose 223 (H) 74 - 99 mg/dL    BUN 29 (H) 7.0 - 18 MG/DL    Creatinine 1.47 (H) 0.6 - 1.3 MG/DL    BUN/Creatinine ratio 20 12 - 20      GFR est AA 43 (L) >60 ml/min/1.73m2    GFR est non-AA 35 (L) >60 ml/min/1.73m2    Calcium 9.1 8.5 - 10.1 MG/DL    Bilirubin, total 0.3 0.2 - 1.0 MG/DL    ALT (SGPT) 19 13 - 56 U/L    AST (SGOT) 14 10 - 38 U/L    Alk.  phosphatase 85 45 - 117 U/L    Protein, total 8.2 6.4 - 8.2 g/dL    Albumin 4.4 3.4 - 5.0 g/dL    Globulin 3.8 2.0 - 4.0 g/dL    A-G Ratio 1.2 0.8 - 1.7     CARDIAC PANEL,(CK, CKMB & TROPONIN)    Collection Time: 12/03/20  8:27 PM   Result Value Ref Range    CK - MB 2.0 <3.6 ng/ml    CK-MB Index 0.7 0.0 - 4.0 %     (H) 26 - 192 U/L    Troponin-I, QT <0.02 0.0 - 0.045 NG/ML   PROTHROMBIN TIME + INR    Collection Time: 12/03/20  8:27 PM   Result Value Ref Range    Prothrombin time 13.0 11.5 - 15.2 sec    INR 1.0 0.8 - 1.2     PTT    Collection Time: 12/03/20  8:27 PM   Result Value Ref Range    aPTT 41.8 (H) 23.0 - 36.4 SEC   TSH 3RD GENERATION    Collection Time: 12/03/20  8:27 PM   Result Value Ref Range    TSH 2.76 0.36 - 3.74 uIU/mL   MAGNESIUM    Collection Time: 12/03/20  8:27 PM   Result Value Ref Range    Magnesium 1.7 1.6 - 2.6 mg/dL   NT-PRO BNP    Collection Time: 12/03/20  8:27 PM   Result Value Ref Range    NT pro- 0 - 900 PG/ML   D DIMER    Collection Time: 12/03/20  8:27 PM   Result Value Ref Range    D DIMER 0.28 <0.46 ug/ml(FEU)   EKG, 12 LEAD, SUBSEQUENT    Collection Time: 12/03/20  8:37 PM   Result Value Ref Range    Ventricular Rate 107 BPM    Atrial Rate 107 BPM    P-R Interval 166 ms    QRS Duration 80 ms    Q-T Interval 338 ms    QTC Calculation (Bezet) 451 ms    Calculated P Axis 67 degrees    Calculated R Axis 85 degrees    Calculated T Axis 48 degrees    Diagnosis       Sinus tachycardia  Septal infarct , age undetermined  Abnormal ECG  When compared with ECG of 03-DEC-2020 20:20,  Septal infarct is now present  ST no longer depressed in Inferior leads  ST less depressed in Anterior leads  T wave inversion no longer evident in Inferior leads     URINALYSIS W/ RFLX MICROSCOPIC    Collection Time: 12/03/20  9:40 PM   Result Value Ref Range    Color YELLOW      Appearance CLEAR      Specific gravity 1.016 1.005 - 1.030      pH (UA) 5.5 5.0 - 8.0      Protein 30 (A) NEG mg/dL    Glucose 100 (A) NEG mg/dL    Ketone Negative NEG mg/dL Bilirubin Negative NEG      Blood Negative NEG      Urobilinogen 0.2 0.2 - 1.0 EU/dL    Nitrites Negative NEG      Leukocyte Esterase Negative NEG     URINE MICROSCOPIC ONLY    Collection Time: 12/03/20  9:40 PM   Result Value Ref Range    WBC 0 to 3 0 - 5 /hpf    RBC 0 to 3 0 - 5 /hpf    Epithelial cells FEW 0 - 5 /lpf    Bacteria FEW (A) NEG /hpf    Granular cast 0 to 3 NEG /lpf   EKG, 12 LEAD, SUBSEQUENT    Collection Time: 12/03/20  9:56 PM   Result Value Ref Range    Ventricular Rate 100 BPM    Atrial Rate 100 BPM    P-R Interval 162 ms    QRS Duration 84 ms    Q-T Interval 362 ms    QTC Calculation (Bezet) 466 ms    Calculated P Axis 68 degrees    Calculated R Axis 71 degrees    Calculated T Axis 39 degrees    Diagnosis       Normal sinus rhythm  Normal ECG  When compared with ECG of 03-DEC-2020 20:37,  Criteria for Septal infarct are no longer present     CARDIAC PANEL,(CK, CKMB & TROPONIN)    Collection Time: 12/03/20 10:00 PM   Result Value Ref Range    CK - MB 1.9 <3.6 ng/ml    CK-MB Index 1.0 0.0 - 4.0 %     (H) 26 - 192 U/L    Troponin-I, QT 0.02 0.0 - 0.045 NG/ML       Radiologic Studies -   XR CHEST PORT   Final Result   IMPRESSION:  No acute cardiopulmonary disease. CT Results  (Last 48 hours)    None        CXR Results  (Last 48 hours)               12/03/20 2042  XR CHEST PORT Final result    Impression:  IMPRESSION:  No acute cardiopulmonary disease. Narrative:  EXAM:  PORTABLE CHEST       INDICATION:  Cardiac. TECHNIQUE:  Portable, AP view. COMPARISON:  None.       ____________________       FINDINGS:         SUPPORT DEVICES: None. HEART AND MEDIASTINUM: Cardiomediastinal silhouette appears within normal   limits. Normal caliber thoracic aorta. LUNGS AND PLEURAL SPACES: Lungs are well aerated with no confluent airspace   opacity or pulmonary edema. No pleural effusion or pneumothorax.          BONY THORAX AND SOFT TISSUES: No acute osseous abnormality. ____________________                   Medical Decision Making   I am the first provider for this patient. I reviewed the vital signs, available nursing notes, past medical history, past surgical history, family history and social history. Vital Signs-Reviewed the patient's vital signs. Pulse Oximetry Analysis -98% on room air    Cardiac Monitor:  Rate: 159 bpm  Rhythm: Regular    EKG interpretation: (Preliminary)  8:18 PM   SVT at 155 bpm.  QRS 76 ms. QTc 469 ms. No acute ST elevation    8:41 PM  Sinus tachycardia 107 bpm.  NH interval 166 ms. QRS 80 ms. QTc 401 ms. No acute ST elevations    10:01 PM  Normal sinus rhythm at 100 bpm.  NH interval 162 ms. QRS 84 ms. QTc 466 ms. No acute ST elevation    Records Reviewed: Nursing Notes and Old Medical Records    Provider Notes:   77 y.o. female with a history of hypertension presenting with palpitations and elevated blood pressure. At initial triage, patient is tachycardic with heart rates in the 150s. EKG showing SVT. However on my assessment, patient heart rate has actually improved to the low 100s. .  Patient had self converted herself. Patient however still persistently elevated. Missed her doses of antihypertensives. Will dose of propanolol. Will evaluate for any metabolic derangements that may have initiated her SVT. Will check for any cardiac strain and obtain troponin. Procedures:  Procedures    ED Course:   8:18 PM   Initial assessment performed. The patients presenting problems have been discussed, and they are in agreement with the care plan formulated and outlined with them. I have encouraged them to ask questions as they arise throughout their visit. 11:00 PM  Patient has had no further episodes of tachyarrhythmia in the emergency department. After receiving her oral medications, patient's blood pressure is now well controlled.   At discharge her blood pressure was 151/92.  2 sets of cardiac enzymes are negative. Discussed with the patient and her son that she should take all her medications as prescribed as her atenolol not only controls her blood pressure but also her arrhythmia. They verbalized an understanding and is in agreement with discharge plan. Diagnosis and Disposition     11:01 PM  I have spent 35 minutes of critical care time involved in lab review, consultations with specialist, family decision-making, and documentation. During this entire length of time I was immediately available to the patient. Critical Care: The reason for providing this level of medical care for this critically ill patient was due a critical illness that impaired one or more vital organ systems such that there was a high probability of imminent or life threatening deterioration in the patients condition. This care involved high complexity decision making to assess, manipulate, and support vital system functions, to treat this degreee vital organ system failure and to prevent further life threatening deterioration of the patients condition. DISCHARGE NOTE:  11:00 PM    Jaycee Soto's  results have been reviewed with her. She has been counseled regarding her diagnosis, treatment, and plan. She verbally conveys understanding and agreement of the signs, symptoms, diagnosis, treatment and prognosis and additionally agrees to follow up as discussed. She also agrees with the care-plan and conveys that all of her questions have been answered. I have also provided discharge instructions for her that include: educational information regarding their diagnosis and treatment, and list of reasons why they would want to return to the ED prior to their follow-up appointment, should her condition change. She has been provided with education for proper emergency department utilization. CLINICAL IMPRESSION:    1. Tachyarrhythmia    2. SVT (supraventricular tachycardia) ([address]    3.  Essential hypertension PLAN:  1. D/C Home  2. Current Discharge Medication List        3. Follow-up Information     Follow up With Specialties Details Why Contact Info    Madan Stevens MD Internal Medicine Schedule an appointment as soon as possible for a visit in 2 days  [facility]   [address] [phone]      Rafal Garzon MD Cardiology Schedule an appointment as soon as possible for a visit in 2 days  [address]  [facility] [address] [place] [id number] [phone]      [facility] EMERGENCY DEPT Emergency Medicine  As needed if symptoms worsen [address] [place] [address]  [phone]        ____________________________________     Please note that this dictation was completed with [employer], the computer voice recognition software. Quite often unanticipated grammatical, syntax, homophones, and other interpretive errors are inadvertently transcribed by the computer software. Please disregard these errors. Please excuse any errors that have escaped final proofreading.

## 2021-03-08 ENCOUNTER — APPOINTMENT (OUTPATIENT)
Dept: GENERAL RADIOLOGY | Age: 66
DRG: 310 | End: 2021-03-08
Attending: EMERGENCY MEDICINE
Payer: MEDICARE

## 2021-03-08 ENCOUNTER — HOSPITAL ENCOUNTER (INPATIENT)
Age: 66
LOS: 1 days | Discharge: HOME | DRG: 310 | End: 2021-03-11
Attending: EMERGENCY MEDICINE | Admitting: HOSPITALIST
Payer: MEDICARE

## 2021-03-08 DIAGNOSIS — R94.31 ABNORMAL EKG: ICD-10-CM

## 2021-03-08 DIAGNOSIS — R94.31 EKG ABNORMALITY: ICD-10-CM

## 2021-03-08 DIAGNOSIS — R11.2 NAUSEA AND VOMITING IN ADULT: Primary | ICD-10-CM

## 2021-03-08 LAB
ALBUMIN SERPL-MCNC: 4 G/DL (ref 3.4–5)
ALBUMIN/GLOB SERPL: 1.1 {RATIO} (ref 0.8–1.7)
ALP SERPL-CCNC: 84 U/L (ref 45–117)
ALT SERPL-CCNC: 19 U/L (ref 13–56)
ANION GAP SERPL CALC-SCNC: 9 MMOL/L (ref 3–18)
AST SERPL-CCNC: 16 U/L (ref 10–38)
BASOPHILS # BLD: 0 K/UL (ref 0–0.1)
BASOPHILS NFR BLD: 0 % (ref 0–2)
BILIRUB SERPL-MCNC: 0.3 MG/DL (ref 0.2–1)
BUN SERPL-MCNC: 22 MG/DL (ref 7–18)
BUN/CREAT SERPL: 17 (ref 12–20)
CALCIUM SERPL-MCNC: 8.9 MG/DL (ref 8.5–10.1)
CHLORIDE SERPL-SCNC: 108 MMOL/L (ref 100–111)
CK MB CFR SERPL CALC: 0.9 % (ref 0–4)
CK MB SERPL-MCNC: 2.2 NG/ML (ref 5–25)
CK SERPL-CCNC: 241 U/L (ref 26–192)
CO2 SERPL-SCNC: 24 MMOL/L (ref 21–32)
CREAT SERPL-MCNC: 1.29 MG/DL (ref 0.6–1.3)
D DIMER PPP FEU-MCNC: 0.42 UG/ML(FEU)
DIFFERENTIAL METHOD BLD: ABNORMAL
EOSINOPHIL # BLD: 0 K/UL (ref 0–0.4)
EOSINOPHIL NFR BLD: 0 % (ref 0–5)
ERYTHROCYTE [DISTWIDTH] IN BLOOD BY AUTOMATED COUNT: 13 % (ref 11.6–14.5)
GLOBULIN SER CALC-MCNC: 3.6 G/DL (ref 2–4)
GLUCOSE SERPL-MCNC: 137 MG/DL (ref 74–99)
HCT VFR BLD AUTO: 32.6 % (ref 35–45)
HGB BLD-MCNC: 10.2 G/DL (ref 12–16)
LYMPHOCYTES # BLD: 1.4 K/UL (ref 0.9–3.6)
LYMPHOCYTES NFR BLD: 12 % (ref 21–52)
MAGNESIUM SERPL-MCNC: 1.8 MG/DL (ref 1.6–2.6)
MCH RBC QN AUTO: 27 PG (ref 24–34)
MCHC RBC AUTO-ENTMCNC: 31.3 G/DL (ref 31–37)
MCV RBC AUTO: 86.2 FL (ref 74–97)
MONOCYTES # BLD: 0.7 K/UL (ref 0.05–1.2)
MONOCYTES NFR BLD: 5 % (ref 3–10)
NEUTS SEG # BLD: 9.9 K/UL (ref 1.8–8)
NEUTS SEG NFR BLD: 83 % (ref 40–73)
PLATELET # BLD AUTO: 397 K/UL (ref 135–420)
PMV BLD AUTO: 10.3 FL (ref 9.2–11.8)
POTASSIUM SERPL-SCNC: 3.5 MMOL/L (ref 3.5–5.5)
PROT SERPL-MCNC: 7.6 G/DL (ref 6.4–8.2)
RBC # BLD AUTO: 3.78 M/UL (ref 4.2–5.3)
SODIUM SERPL-SCNC: 141 MMOL/L (ref 136–145)
TROPONIN I SERPL-MCNC: <0.02 NG/ML (ref 0–0.04)
WBC # BLD AUTO: 11.9 K/UL (ref 4.6–13.2)

## 2021-03-08 PROCEDURE — 99285 EMERGENCY DEPT VISIT HI MDM: CPT

## 2021-03-08 PROCEDURE — 83735 ASSAY OF MAGNESIUM: CPT

## 2021-03-08 PROCEDURE — 71045 X-RAY EXAM CHEST 1 VIEW: CPT

## 2021-03-08 PROCEDURE — 74011000250 HC RX REV CODE- 250: Performed by: EMERGENCY MEDICINE

## 2021-03-08 PROCEDURE — 85379 FIBRIN DEGRADATION QUANT: CPT

## 2021-03-08 PROCEDURE — 74011250636 HC RX REV CODE- 250/636: Performed by: EMERGENCY MEDICINE

## 2021-03-08 PROCEDURE — 83690 ASSAY OF LIPASE: CPT

## 2021-03-08 PROCEDURE — 96374 THER/PROPH/DIAG INJ IV PUSH: CPT

## 2021-03-08 PROCEDURE — 74011250637 HC RX REV CODE- 250/637: Performed by: EMERGENCY MEDICINE

## 2021-03-08 PROCEDURE — 85025 COMPLETE CBC W/AUTO DIFF WBC: CPT

## 2021-03-08 PROCEDURE — 80053 COMPREHEN METABOLIC PANEL: CPT

## 2021-03-08 PROCEDURE — 96375 TX/PRO/DX INJ NEW DRUG ADDON: CPT

## 2021-03-08 PROCEDURE — 93005 ELECTROCARDIOGRAM TRACING: CPT

## 2021-03-08 PROCEDURE — 82553 CREATINE MB FRACTION: CPT

## 2021-03-08 RX ORDER — ONDANSETRON 2 MG/ML
4 INJECTION INTRAMUSCULAR; INTRAVENOUS
Status: COMPLETED | OUTPATIENT
Start: 2021-03-08 | End: 2021-03-08

## 2021-03-08 RX ORDER — FAMOTIDINE 10 MG/ML
20 INJECTION INTRAVENOUS
Status: COMPLETED | OUTPATIENT
Start: 2021-03-08 | End: 2021-03-08

## 2021-03-08 RX ADMIN — SODIUM CHLORIDE 1000 ML: 900 INJECTION, SOLUTION INTRAVENOUS at 22:32

## 2021-03-08 RX ADMIN — FAMOTIDINE 20 MG: 10 INJECTION INTRAVENOUS at 22:34

## 2021-03-08 RX ADMIN — LIDOCAINE HYDROCHLORIDE 40 ML: 20 SOLUTION ORAL; TOPICAL at 22:34

## 2021-03-08 RX ADMIN — ONDANSETRON 4 MG: 2 INJECTION INTRAMUSCULAR; INTRAVENOUS at 22:34

## 2021-03-09 ENCOUNTER — APPOINTMENT (OUTPATIENT)
Dept: ULTRASOUND IMAGING | Age: 66
DRG: 310 | End: 2021-03-09
Attending: HOSPITALIST
Payer: MEDICARE

## 2021-03-09 ENCOUNTER — APPOINTMENT (OUTPATIENT)
Dept: NON INVASIVE DIAGNOSTICS | Age: 66
DRG: 310 | End: 2021-03-09
Attending: HOSPITALIST
Payer: MEDICARE

## 2021-03-09 PROBLEM — R11.2 NAUSEA AND VOMITING: Status: ACTIVE | Noted: 2021-03-09

## 2021-03-09 PROBLEM — R94.31 EKG ABNORMALITIES: Status: RESOLVED | Noted: 2021-03-09 | Resolved: 2021-03-09

## 2021-03-09 PROBLEM — R94.31 EKG ABNORMALITIES: Status: ACTIVE | Noted: 2021-03-09

## 2021-03-09 PROBLEM — I10 ACCELERATED HYPERTENSION: Status: ACTIVE | Noted: 2021-03-09

## 2021-03-09 LAB
ANION GAP SERPL CALC-SCNC: 7 MMOL/L (ref 3–18)
APPEARANCE UR: CLEAR
ATRIAL RATE: 129 BPM
ATRIAL RATE: 136 BPM
BACTERIA URNS QL MICRO: NEGATIVE /HPF
BILIRUB UR QL: NEGATIVE
BUN SERPL-MCNC: 19 MG/DL (ref 7–18)
BUN/CREAT SERPL: 16 (ref 12–20)
CALCIUM SERPL-MCNC: 8.9 MG/DL (ref 8.5–10.1)
CALCULATED R AXIS, ECG10: 75 DEGREES
CALCULATED R AXIS, ECG10: 79 DEGREES
CALCULATED T AXIS, ECG11: -50 DEGREES
CALCULATED T AXIS, ECG11: 11 DEGREES
CHLORIDE SERPL-SCNC: 110 MMOL/L (ref 100–111)
CK MB CFR SERPL CALC: 1.2 % (ref 0–4)
CK MB SERPL-MCNC: 3.1 NG/ML (ref 5–25)
CK SERPL-CCNC: 249 U/L (ref 26–192)
CO2 SERPL-SCNC: 25 MMOL/L (ref 21–32)
COLOR UR: YELLOW
CREAT SERPL-MCNC: 1.18 MG/DL (ref 0.6–1.3)
DIAGNOSIS, 93000: NORMAL
DIAGNOSIS, 93000: NORMAL
EPITH CASTS URNS QL MICRO: NORMAL /LPF (ref 0–5)
ERYTHROCYTE [DISTWIDTH] IN BLOOD BY AUTOMATED COUNT: 13 % (ref 11.6–14.5)
GLUCOSE BLD STRIP.AUTO-MCNC: 118 MG/DL (ref 70–110)
GLUCOSE SERPL-MCNC: 106 MG/DL (ref 74–99)
GLUCOSE UR STRIP.AUTO-MCNC: NEGATIVE MG/DL
HCT VFR BLD AUTO: 31.1 % (ref 35–45)
HGB BLD-MCNC: 10.1 G/DL (ref 12–16)
HGB UR QL STRIP: NEGATIVE
KETONES UR QL STRIP.AUTO: NEGATIVE MG/DL
LEUKOCYTE ESTERASE UR QL STRIP.AUTO: NEGATIVE
LIPASE SERPL-CCNC: 284 U/L (ref 73–393)
MAGNESIUM SERPL-MCNC: 1.8 MG/DL (ref 1.6–2.6)
MCH RBC QN AUTO: 28.3 PG (ref 24–34)
MCHC RBC AUTO-ENTMCNC: 32.5 G/DL (ref 31–37)
MCV RBC AUTO: 87.1 FL (ref 74–97)
NITRITE UR QL STRIP.AUTO: NEGATIVE
P-R INTERVAL, ECG05: 130 MS
PH UR STRIP: 6.5 [PH] (ref 5–8)
PLATELET # BLD AUTO: 338 K/UL (ref 135–420)
PMV BLD AUTO: 9.8 FL (ref 9.2–11.8)
POTASSIUM SERPL-SCNC: 3.4 MMOL/L (ref 3.5–5.5)
PROT UR STRIP-MCNC: ABNORMAL MG/DL
Q-T INTERVAL, ECG07: 330 MS
Q-T INTERVAL, ECG07: 332 MS
QRS DURATION, ECG06: 88 MS
QRS DURATION, ECG06: 96 MS
QTC CALCULATION (BEZET), ECG08: 483 MS
QTC CALCULATION (BEZET), ECG08: 499 MS
RBC # BLD AUTO: 3.57 M/UL (ref 4.2–5.3)
RBC #/AREA URNS HPF: NEGATIVE /HPF (ref 0–5)
SODIUM SERPL-SCNC: 142 MMOL/L (ref 136–145)
SP GR UR REFRACTOMETRY: 1.01 (ref 1–1.03)
TROPONIN I SERPL-MCNC: 0.02 NG/ML (ref 0–0.04)
UROBILINOGEN UR QL STRIP.AUTO: 0.2 EU/DL (ref 0.2–1)
VENTRICULAR RATE, ECG03: 129 BPM
VENTRICULAR RATE, ECG03: 136 BPM
WBC # BLD AUTO: 12.2 K/UL (ref 4.6–13.2)
WBC URNS QL MICRO: NEGATIVE /HPF (ref 0–5)

## 2021-03-09 PROCEDURE — 76705 ECHO EXAM OF ABDOMEN: CPT

## 2021-03-09 PROCEDURE — 36415 COLL VENOUS BLD VENIPUNCTURE: CPT

## 2021-03-09 PROCEDURE — 99218 HC RM OBSERVATION: CPT

## 2021-03-09 PROCEDURE — 74011250637 HC RX REV CODE- 250/637: Performed by: FAMILY MEDICINE

## 2021-03-09 PROCEDURE — 81001 URINALYSIS AUTO W/SCOPE: CPT

## 2021-03-09 PROCEDURE — 85027 COMPLETE CBC AUTOMATED: CPT

## 2021-03-09 PROCEDURE — 74011250637 HC RX REV CODE- 250/637: Performed by: HOSPITALIST

## 2021-03-09 PROCEDURE — 82553 CREATINE MB FRACTION: CPT

## 2021-03-09 PROCEDURE — 80048 BASIC METABOLIC PNL TOTAL CA: CPT

## 2021-03-09 PROCEDURE — 83735 ASSAY OF MAGNESIUM: CPT

## 2021-03-09 PROCEDURE — 97162 PT EVAL MOD COMPLEX 30 MIN: CPT

## 2021-03-09 PROCEDURE — 93306 TTE W/DOPPLER COMPLETE: CPT

## 2021-03-09 PROCEDURE — 74011250636 HC RX REV CODE- 250/636: Performed by: FAMILY MEDICINE

## 2021-03-09 PROCEDURE — 82962 GLUCOSE BLOOD TEST: CPT

## 2021-03-09 PROCEDURE — 93005 ELECTROCARDIOGRAM TRACING: CPT

## 2021-03-09 PROCEDURE — 96372 THER/PROPH/DIAG INJ SC/IM: CPT

## 2021-03-09 RX ORDER — ATORVASTATIN CALCIUM 20 MG/1
40 TABLET, FILM COATED ORAL DAILY
Status: DISCONTINUED | OUTPATIENT
Start: 2021-03-09 | End: 2021-03-11 | Stop reason: HOSPADM

## 2021-03-09 RX ORDER — AMLODIPINE BESYLATE 5 MG/1
10 TABLET ORAL DAILY
Status: DISCONTINUED | OUTPATIENT
Start: 2021-03-09 | End: 2021-03-11 | Stop reason: HOSPADM

## 2021-03-09 RX ORDER — ATORVASTATIN CALCIUM 40 MG/1
40 TABLET, FILM COATED ORAL
COMMUNITY

## 2021-03-09 RX ORDER — SPIRONOLACTONE 25 MG/1
25 TABLET ORAL DAILY
Status: DISCONTINUED | OUTPATIENT
Start: 2021-03-09 | End: 2021-03-11 | Stop reason: HOSPADM

## 2021-03-09 RX ORDER — LORAZEPAM 2 MG/ML
1 INJECTION INTRAMUSCULAR
Status: DISCONTINUED | OUTPATIENT
Start: 2021-03-09 | End: 2021-03-11 | Stop reason: HOSPADM

## 2021-03-09 RX ORDER — SODIUM CHLORIDE 0.9 % (FLUSH) 0.9 %
5-40 SYRINGE (ML) INJECTION AS NEEDED
Status: DISCONTINUED | OUTPATIENT
Start: 2021-03-09 | End: 2021-03-11 | Stop reason: HOSPADM

## 2021-03-09 RX ORDER — HYDROCHLOROTHIAZIDE 25 MG/1
25 TABLET ORAL DAILY
Status: DISCONTINUED | OUTPATIENT
Start: 2021-03-09 | End: 2021-03-09

## 2021-03-09 RX ORDER — ACETAMINOPHEN 650 MG/1
650 SUPPOSITORY RECTAL
Status: DISCONTINUED | OUTPATIENT
Start: 2021-03-09 | End: 2021-03-11 | Stop reason: HOSPADM

## 2021-03-09 RX ORDER — LISINOPRIL 20 MG/1
20 TABLET ORAL DAILY
Status: DISCONTINUED | OUTPATIENT
Start: 2021-03-09 | End: 2021-03-11 | Stop reason: HOSPADM

## 2021-03-09 RX ORDER — FAMOTIDINE 20 MG/1
20 TABLET, FILM COATED ORAL DAILY
Status: DISCONTINUED | OUTPATIENT
Start: 2021-03-09 | End: 2021-03-11 | Stop reason: HOSPADM

## 2021-03-09 RX ORDER — PROMETHAZINE HYDROCHLORIDE 25 MG/1
12.5 TABLET ORAL
Status: DISCONTINUED | OUTPATIENT
Start: 2021-03-09 | End: 2021-03-11 | Stop reason: HOSPADM

## 2021-03-09 RX ORDER — POTASSIUM CHLORIDE 20 MEQ/1
20 TABLET, EXTENDED RELEASE ORAL
Status: COMPLETED | OUTPATIENT
Start: 2021-03-09 | End: 2021-03-09

## 2021-03-09 RX ORDER — LOSARTAN POTASSIUM 50 MG/1
100 TABLET ORAL DAILY
Status: DISCONTINUED | OUTPATIENT
Start: 2021-03-09 | End: 2021-03-11 | Stop reason: HOSPADM

## 2021-03-09 RX ORDER — POLYETHYLENE GLYCOL 3350 17 G/17G
17 POWDER, FOR SOLUTION ORAL DAILY PRN
Status: DISCONTINUED | OUTPATIENT
Start: 2021-03-09 | End: 2021-03-11 | Stop reason: HOSPADM

## 2021-03-09 RX ORDER — ONDANSETRON 2 MG/ML
4 INJECTION INTRAMUSCULAR; INTRAVENOUS
Status: DISCONTINUED | OUTPATIENT
Start: 2021-03-09 | End: 2021-03-11 | Stop reason: HOSPADM

## 2021-03-09 RX ORDER — SODIUM CHLORIDE 0.9 % (FLUSH) 0.9 %
5-40 SYRINGE (ML) INJECTION EVERY 8 HOURS
Status: DISCONTINUED | OUTPATIENT
Start: 2021-03-09 | End: 2021-03-11 | Stop reason: HOSPADM

## 2021-03-09 RX ORDER — ATENOLOL 50 MG/1
100 TABLET ORAL 2 TIMES DAILY
Status: DISCONTINUED | OUTPATIENT
Start: 2021-03-09 | End: 2021-03-11 | Stop reason: HOSPADM

## 2021-03-09 RX ORDER — ENOXAPARIN SODIUM 100 MG/ML
40 INJECTION SUBCUTANEOUS DAILY
Status: DISCONTINUED | OUTPATIENT
Start: 2021-03-09 | End: 2021-03-11 | Stop reason: HOSPADM

## 2021-03-09 RX ORDER — HYDRALAZINE HYDROCHLORIDE 25 MG/1
25 TABLET, FILM COATED ORAL 3 TIMES DAILY
Status: DISCONTINUED | OUTPATIENT
Start: 2021-03-09 | End: 2021-03-09

## 2021-03-09 RX ORDER — LANOLIN ALCOHOL/MO/W.PET/CERES
400 CREAM (GRAM) TOPICAL
Status: COMPLETED | OUTPATIENT
Start: 2021-03-09 | End: 2021-03-09

## 2021-03-09 RX ORDER — LOSARTAN POTASSIUM AND HYDROCHLOROTHIAZIDE 25; 100 MG/1; MG/1
1 TABLET ORAL DAILY
COMMUNITY

## 2021-03-09 RX ORDER — FAMOTIDINE 20 MG/1
20 TABLET, FILM COATED ORAL 2 TIMES DAILY
Status: DISCONTINUED | OUTPATIENT
Start: 2021-03-09 | End: 2021-03-09 | Stop reason: DRUGHIGH

## 2021-03-09 RX ORDER — ACETAMINOPHEN 325 MG/1
650 TABLET ORAL
Status: DISCONTINUED | OUTPATIENT
Start: 2021-03-09 | End: 2021-03-11 | Stop reason: HOSPADM

## 2021-03-09 RX ORDER — SPIRONOLACTONE 25 MG/1
25 TABLET ORAL DAILY
COMMUNITY

## 2021-03-09 RX ADMIN — ENOXAPARIN SODIUM 40 MG: 100 INJECTION SUBCUTANEOUS at 11:05

## 2021-03-09 RX ADMIN — SPIRONOLACTONE 25 MG: 25 TABLET ORAL at 11:05

## 2021-03-09 RX ADMIN — Medication 10 ML: at 07:18

## 2021-03-09 RX ADMIN — AMLODIPINE BESYLATE 10 MG: 5 TABLET ORAL at 11:04

## 2021-03-09 RX ADMIN — Medication 10 ML: at 15:12

## 2021-03-09 RX ADMIN — ATENOLOL 100 MG: 50 TABLET ORAL at 11:04

## 2021-03-09 RX ADMIN — LOSARTAN POTASSIUM 100 MG: 50 TABLET, FILM COATED ORAL at 11:04

## 2021-03-09 RX ADMIN — LISINOPRIL 20 MG: 20 TABLET ORAL at 11:04

## 2021-03-09 RX ADMIN — FAMOTIDINE 20 MG: 20 TABLET ORAL at 11:05

## 2021-03-09 RX ADMIN — Medication 400 MG: at 04:15

## 2021-03-09 RX ADMIN — POTASSIUM CHLORIDE 20 MEQ: 1500 TABLET, EXTENDED RELEASE ORAL at 11:05

## 2021-03-09 RX ADMIN — POTASSIUM BICARBONATE 20 MEQ: 782 TABLET, EFFERVESCENT ORAL at 04:15

## 2021-03-09 RX ADMIN — ATENOLOL 100 MG: 50 TABLET ORAL at 21:05

## 2021-03-09 RX ADMIN — ATORVASTATIN CALCIUM 40 MG: 20 TABLET, FILM COATED ORAL at 11:04

## 2021-03-09 NOTE — PROGRESS NOTES
Problem: Mobility Impaired (Adult and Pediatric)  Goal: *Acute Goals and Plan of Care (Insert Text)  Outcome: Resolved/Met   PHYSICAL THERAPY EVALUATION AND DISCHARGE    Patient: Michel Holguin (35 y.o. female)  Date: 3/9/2021  Primary Diagnosis: EKG abnormalities [R94.31]  Precautions: None  PLOF: Independent  ASSESSMENT :  Daughter present for translation. Reports independent for all mobility. Denies mobility concerns with return home. Reports amb in room with no AD and no difficulties. Mod I for sit to supine. Seated EOB with good balance. Mod I for sit to stand. Amb 25ft with no AD and steady gait. Completed alternating toe taps to 6 inch block to simulate steps with no difficulties. Utilized bathroom at end of session with no mobility assistance needed. Call bell in reach. Skilled physical therapy is not indicated at this time. PLAN :  Discharge Recommendations: None  Further Equipment Recommendations for Discharge: None     SUBJECTIVE:   Patient stated Okay.     OBJECTIVE DATA SUMMARY:     Past Medical History:   Diagnosis Date    Hypertension    No past surgical history on file.   Barriers to Learning/Limitations: yes;  language  Compensate with: Visual Cues, Verbal Cues, Tactile Cues and Kinesthetic Cues  Home Situation:   Home Situation  Home Environment: Private residence([facility])  One/Two Story Residence: Two story  Living Alone: No  Support Systems: Child(ty)  Patient Expects to be Discharged to[de-identified] Private residence([facility])  Current DME Used/Available at Home: None  Critical Behavior:  Neurologic State: Alert  Orientation Level: Oriented X4  Cognition: Follows commands     Psychosocial  Patient Behaviors: Cooperative    Strength:    Manual Muscle Testing (LE)         R     L    Hip Flexion:   5/5  5/5  Knee EXT:   5/5  5/5  Knee FLEX:   5/5  5/5  Ankle DF:   5/5  5/5  _________________________________________________   Range Of Motion:  BLE AROM WFL  Functional Mobility:  Bed Mobility:  Supine to Sit: Modified independent  Transfers:  Sit to Stand: Modified independent  Stand to Sit: Modified independent  Balance:   Sitting: Intact  Standing: Intact  Ambulation/Gait Training:  Distance (ft): 25 Feet (ft)   Ambulation - Level of Assistance: Independent  Stairs: alternating toe taps to 6 inch block to simulate stairs at home  Level of Assistance: Independent  Rail Use: left  Number of Stairs: 5    Pain:  Pain level pre-treatment: 0/10   Pain level post-treatment: 0/10    Activity Tolerance:   Good    After treatment:   []         Patient left in no apparent distress sitting up in chair  [x]         Patient left in no apparent distress in bed  [x]         Call bell left within reach  [x]         Nursing notified  []         Caregiver present  []         Bed alarm activated  []         SCDs applied    COMMUNICATION/EDUCATION:   [x]         Role of physical therapy in the acute care setting. [x]         Fall prevention education was provided and the patient/caregiver indicated understanding. [x]         Patient/family have participated as able in goal setting and plan of care. [x]         Patient/family agree to work toward stated goals and plan of care. []         Patient understands intent and goals of therapy, but is neutral about his/her participation. []         Patient is unable to participate in goal setting/plan of care: ongoing with therapy staff.     Thank you for this referral.  Carrol Hernandez, PT   Time Calculation: 8 mins    Eval Complexity: History: MEDIUM  Complexity : 1-2 comorbidities / personal factors will impact the outcome/ POC Exam:MEDIUM Complexity : 3 Standardized tests and measures addressing body structure, function, activity limitation and / or participation in recreation  Presentation: MEDIUM Complexity : Evolving with changing characteristics  Clinical Decision Making:Medium Complexity   Clinical judgement; ROM, MMT, functional mobility Overall Complexity:MEDIUM

## 2021-03-09 NOTE — PROGRESS NOTES
Hospitalist Progress Note    Patient: Aric Akbar MRN: [id number]  CSN: [id number]    YOB: 1955  Age: 77 y.o.   Sex: female    DOA: 3/8/2021 LOS:  LOS: 0 days          See Dr Haywood Pac H&P from this am    Patient seen on rounds    She feels better  She denies abd pain  She is hungry  She has no CP or SOB    Translation thru [employer] on phone completed for brief ROS review    Exam appears benign    She had ercp in 2018 with retrieval of stones, due to her N/V, will repeat her Abd US    LFT are ok    Echo, cardiology consult, resume home BP meds    DVT prophylaxis    Follow on telemetry

## 2021-03-09 NOTE — PROGRESS NOTES
Reason for Admission:  Nausea and vomiting                     RUR Score:  N/A                    Plan for utilizing home health:   TBD       PCP: First and Last name:  Brigette Peres MD     Name of Practice:    Are you a current patient: Yes/No:    Approximate date of last visit:    Can you participate in a virtual visit with your PCP:                     Current Advanced Directive/Advance Care Plan: Full Code      Healthcare Decision Maker:   Click here to complete [address] including selection of the Healthcare Decision Maker Relationship (ie \"Primary\") John Rincon ([phone])                             Transition of Care Plan:    Home with physician follow up                   Chart reviewed. Per H&P \"Kath Galicia is a 77 y.o. female with past medical history of hypertension who presents to the emergency department complaining of nausea, vomiting, and elevated blood pressure.  Per ED chart and ED physician, patient's son who was translating for patient at her request ( ED staff offered video  and patient declined), she had 3 episodes of vomiting this morning and when his sister took her blood pressure her systolic blood pressure was over 200. In ED, pt's nausea has resolved on arrival to the ED. She denies any chest pain, shortness of breath, fever, cough, abdominal pain, bowel or urinary complaints.  No known sick contacts or recent travel. Tyrone Dixon prior to coming to the emergency department, she took her 3 evening antihypertensive agents.   Pt very tearful about her diagnosis and wants me to call her daughter because she had a work up done by another cardiologist. \"    Per previous admissions pt lives with her daughter and other family live in the area. Please encourage ambulation or order therapy services when appropriate to assist with determining an appropriate transition of care plan.   CM to continue to follow and assist.    1155:  CM met with pt and her daughter, Mariola Murphy ([phone]), at bedside to discuss transition of care. Pt's daughter has indicated pt is independent and her PCP in the community is Dr. Marycarmen Carranza. Please encourage ambulation as appropriate. Anticipate pt will transition home with physician follow up with in the next 24-48 hours. Pt's family will transport pt home when medically stable. CM to continue to follow and assist.     Care Management Interventions  Mode of Transport at Discharge:  Other (see comment)(Family)  Transition of Care Consult (CM Consult): Discharge Planning  Health Maintenance Reviewed: Yes  Current Support Network: Relative's Home, Family Lives Nearby  Confirm Follow Up Transport: Family  The Plan for Transition of Care is Related to the Following Treatment Goals : Home with physician follow up    Discharge Location  Discharge Placement: Home with family assistance

## 2021-03-09 NOTE — ROUTINE PROCESS
Bedside and Verbal shift change report given to ANTIONETTE Arnold RN (oncoming nurse) by Artur Fowler. Pascale Calvo RN (offgoing nurse).  Report included the following information SBAR, Kardex, Accordion and Cardiac Rhythm SR

## 2021-03-09 NOTE — ED NOTES
Verbal shift exchange report given to  Nikki De La Cruz RN report included the following information ED summary, SBAR, MAR.

## 2021-03-09 NOTE — ED PROVIDER NOTES
EMERGENCY DEPARTMENT HISTORY AND PHYSICAL EXAM    Date: 3/8/2021  Patient Name: Telly Parra    History of Presenting Illness     Chief Complaint   Patient presents with    Hypertension         History Provided By: Patient and Patient's Son    10:03 PM  Telly Parra is a 77 y.o. female with PMHX of hypertension who presents to the emergency department C/O nausea, vomiting, elevated blood pressure. Per patient's son who is translating for patient at her request (offered video , patient decline), she had 3 episodes of vomiting this morning and when his sister took her blood pressure her systolic was over 958. Patient states she no longer has nausea now. She denies any chest pain, shortness of breath, fever, cough, abdominal pain, bowel or urinary complaints. No known sick contacts or recent travel. Just prior to coming to the emergency department she took her 3 evening antihypertensive agents. No relieving or exacerbating factors identified. PCP: Darien Maynard MD    Current Outpatient Medications   Medication Sig Dispense Refill    hydrALAZINE (APRESOLINE) 25 mg tablet Take 1 Tab by mouth three (3) times daily. 90 Tab 0    lisinopril (PRINIVIL, ZESTRIL) 20 mg tablet Take 1 Tab by mouth daily. 30 Tab 0    atenolol (TENORMIN) 100 mg tablet Take 1 Tab by mouth two (2) times a day. 30 Tab 0    amLODIPine (NORVASC) 10 mg tablet Take 1 Tab by mouth daily.  30 Tab 0     Facility-Administered Medications Ordered in Other Encounters   Medication Dose Route Frequency Provider Last Rate Last Admin    esmolol (BREVIBLOC) 100 mg/10 mL (10 mg/mL) injection   IntraVENous PRN Roberto Light V, CRNA   10 mg at 09/19/18 1516    fentaNYL citrate (PF) injection   IntraVENous PRN Keven Barillas CRNA   25 mcg at 09/19/18 1518    midazolam (VERSED) injection   IntraVENous PRN Roberto Light V, CRNA   1 mg at 09/19/18 1508    lidocaine (PF) (XYLOCAINE) 20 mg/mL (2 %) injection IntraVENous PRN John Blaze V, CRNA   40 mg at 09/19/18 1528    propofol (DIPRIVAN) 10 mg/mL injection   IntraVENous PRN John Blaze V, CRNA   20 mg at 09/19/18 1623    rocuronium (ZEMURON) injection   IntraVENous PRN John Blaze V, CRNA   10 mg at 09/19/18 1552    PHENYLephrine (KELSEY-SYNEPHRINE) 10,000 mcg in 0.9% sodium chloride 100 mL infusion  10,000 mcg IntraVENous CONTINUOUS Thora Gram, [place] V, CRNA   200 mcg at 09/19/18 1546    glucagon (GLUCAGEN) injection   IntraVENous PRN Lisa Yancey MD   1 mg at 09/19/18 1634    ondansetron (ZOFRAN) injection    PRN Lisa Yancey MD   4 mg at 09/19/18 1615    lactated Ringers infusion   IntraVENous CONTINUOUS Justine Adriel, DO   New Bag at 09/19/18 1636    glycopyrrolate (ROBINUL) injection   IntraVENous PRN Justine Schroederrick, DO   0.4 mg at 09/19/18 1641    neostigmine methylsulfate (PROSTIGMINE) 1 mg/mL syringe   IntraVENous PRN Justine Schroederrick, DO   2 mg at 09/19/18 1641    ketorolac (TORADOL) injection   IntraVENous PRN Justine Adriel, DO   15 mg at 09/19/18 1645       Past History     Past Medical History:  Past Medical History:   Diagnosis Date    Hypertension        Past Surgical History:  No past surgical history on file. Family History:  No family history on file. Social History:  Social History     Tobacco Use    Smoking status: Never Smoker    Smokeless tobacco: Never Used   Substance Use Topics    Alcohol use: No    Drug use: Never       Allergies:  No Known Allergies      Review of Systems   Review of Systems   Constitutional: Negative for fever. Respiratory: Negative for shortness of breath. Cardiovascular: Negative for chest pain. Gastrointestinal: Positive for nausea and vomiting. Negative for abdominal pain. All other systems reviewed and are negative.         Physical Exam     Vitals:    03/08/21 2230 03/08/21 2300 03/08/21 2330 03/09/21 0158   BP: (!) 166/92 (!) 168/86 (!) 172/84 (!) 172/82   Pulse: (!) 133 (!) 129 (!) 128 (!) 129   Resp: 22 18 19 15   Temp:       SpO2: 98% 99% 98% 98%   Weight:         Physical Exam    Nursing notes and vital signs reviewed    Constitutional: Non toxic, elderly appearing, moderate distress  Head: Normocephalic, Atraumatic  Eyes: EOMI  Neck: Supple  Cardiovascular: Regular rate and rhythm, no murmurs, rubs, or gallops  Chest: Normal work of breathing and chest excursion bilaterally  Lungs: Clear to ausculation bilaterally  Abdomen: Soft, non tender, non distended  Back: No evidence of trauma or deformity  Extremities: No evidence of trauma or deformity, no LE edema  Skin: Warm and dry, normal cap refill  Neuro: Alert and appropriate, CN intact, normal speech, strength and sensation symmetric bilaterally, normal gait, normal coordination  Psychiatric: Normal mood and affect      Diagnostic Study Results     Labs -     Recent Results (from the past 12 hour(s))   EKG, 12 LEAD, INITIAL    Collection Time: 03/08/21 10:10 PM   Result Value Ref Range    Ventricular Rate 136 BPM    Atrial Rate 136 BPM    P-R Interval 130 ms    QRS Duration 88 ms    Q-T Interval 332 ms    QTC Calculation (Bezet) 499 ms    Calculated R Axis 75 degrees    Calculated T Axis -50 degrees    Diagnosis       Sinus tachycardia  Marked ST abnormality, possible inferior subendocardial injury  Abnormal ECG  When compared with ECG of 03-DEC-2020 21:56,  ST now depressed in Inferior leads  ST more depressed in Anterolateral leads  T wave inversion now evident in Inferior leads  T wave inversion now evident in Lateral leads     CBC WITH AUTOMATED DIFF    Collection Time: 03/08/21 10:20 PM   Result Value Ref Range    WBC 11.9 4.6 - 13.2 K/uL    RBC 3.78 (L) 4.20 - 5.30 M/uL    HGB 10.2 (L) 12.0 - 16.0 g/dL    HCT 32.6 (L) 35.0 - 45.0 %    MCV 86.2 74.0 - 97.0 FL    MCH 27.0 24.0 - 34.0 PG    MCHC 31.3 31.0 - 37.0 g/dL    RDW 13.0 11.6 - 14.5 %    PLATELET 356 564 - 359 K/uL    MPV 10.3 9.2 - 11.8 FL    NEUTROPHILS 83 (H) 40 - 73 %    LYMPHOCYTES 12 (L) 21 - 52 %    MONOCYTES 5 3 - 10 %    EOSINOPHILS 0 0 - 5 %    BASOPHILS 0 0 - 2 %    ABS. NEUTROPHILS 9.9 (H) 1.8 - 8.0 K/UL    ABS. LYMPHOCYTES 1.4 0.9 - 3.6 K/UL    ABS. MONOCYTES 0.7 0.05 - 1.2 K/UL    ABS. EOSINOPHILS 0.0 0.0 - 0.4 K/UL    ABS. BASOPHILS 0.0 0.0 - 0.1 K/UL    DF AUTOMATED     MAGNESIUM    Collection Time: 03/08/21 10:50 PM   Result Value Ref Range    Magnesium 1.8 1.6 - 2.6 mg/dL   METABOLIC PANEL, COMPREHENSIVE    Collection Time: 03/08/21 10:50 PM   Result Value Ref Range    Sodium 141 136 - 145 mmol/L    Potassium 3.5 3.5 - 5.5 mmol/L    Chloride 108 100 - 111 mmol/L    CO2 24 21 - 32 mmol/L    Anion gap 9 3.0 - 18 mmol/L    Glucose 137 (H) 74 - 99 mg/dL    BUN 22 (H) 7.0 - 18 MG/DL    Creatinine 1.29 0.6 - 1.3 MG/DL    BUN/Creatinine ratio 17 12 - 20      GFR est AA 50 (L) >60 ml/min/1.73m2    GFR est non-AA 41 (L) >60 ml/min/1.73m2    Calcium 8.9 8.5 - 10.1 MG/DL    Bilirubin, total 0.3 0.2 - 1.0 MG/DL    ALT (SGPT) 19 13 - 56 U/L    AST (SGOT) 16 10 - 38 U/L    Alk.  phosphatase 84 45 - 117 U/L    Protein, total 7.6 6.4 - 8.2 g/dL    Albumin 4.0 3.4 - 5.0 g/dL    Globulin 3.6 2.0 - 4.0 g/dL    A-G Ratio 1.1 0.8 - 1.7     CARDIAC PANEL,(CK, CKMB & TROPONIN)    Collection Time: 03/08/21 10:50 PM   Result Value Ref Range    CK - MB 2.2 <3.6 ng/ml    CK-MB Index 0.9 0.0 - 4.0 %     (H) 26 - 192 U/L    Troponin-I, QT <0.02 0.0 - 0.045 NG/ML   LIPASE    Collection Time: 03/08/21 10:50 PM   Result Value Ref Range    Lipase 284 73 - 393 U/L   D DIMER    Collection Time: 03/08/21 10:50 PM   Result Value Ref Range    D DIMER 0.42 <0.46 ug/ml(FEU)   URINALYSIS W/ RFLX MICROSCOPIC    Collection Time: 03/09/21 12:30 AM   Result Value Ref Range    Color YELLOW      Appearance CLEAR      Specific gravity 1.006 1.005 - 1.030      pH (UA) 6.5 5.0 - 8.0      Protein TRACE (A) NEG mg/dL    Glucose Negative NEG mg/dL    Ketone Negative NEG mg/dL    Bilirubin Negative NEG Blood Negative NEG      Urobilinogen 0.2 0.2 - 1.0 EU/dL    Nitrites Negative NEG      Leukocyte Esterase Negative NEG     URINE MICROSCOPIC ONLY    Collection Time: 03/09/21 12:30 AM   Result Value Ref Range    WBC Negative 0 - 5 /hpf    RBC Negative 0 - 5 /hpf    Epithelial cells FEW 0 - 5 /lpf    Bacteria Negative NEG /hpf   EKG, 12 LEAD, SUBSEQUENT    Collection Time: 03/09/21  1:48 AM   Result Value Ref Range    Ventricular Rate 129 BPM    Atrial Rate 129 BPM    QRS Duration 96 ms    Q-T Interval 330 ms    QTC Calculation (Bezet) 483 ms    Calculated R Axis 79 degrees    Calculated T Axis 11 degrees    Diagnosis       Accelerated Junctional rhythm  Incomplete right bundle branch block  Nonspecific ST abnormality  Abnormal ECG  When compared with ECG of 08-MAR-2021 22:10,  Junctional rhythm has replaced Sinus rhythm  ST less depressed in Anterior leads  T wave inversion no longer evident in Inferior leads  T wave inversion no longer evident in Lateral leads         Radiologic Studies -   XR CHEST PORT   Final Result      Borderline heart size. Mild interstitial prominence appears similar to prior,   could be chronic. No focal consolidation, no pleural effusion or pneumothorax. CT Results  (Last 48 hours)    None        CXR Results  (Last 48 hours)               03/08/21 2238  XR CHEST PORT Final result    Impression:      Borderline heart size. Mild interstitial prominence appears similar to prior,   could be chronic. No focal consolidation, no pleural effusion or pneumothorax. Narrative:  HISTORY: Vomiting, hypertension. COMPARISON: 12/3/2020. FINDINGS:       A portable view of the chest:       Stable mediastinal contour. Borderline heart size. Mild interstitial prominence   appears similar to prior, could be chronic. No focal consolidation, no pleural   effusion or pneumothorax.                  Medications given in the ED-  Medications   sodium chloride 0.9 % bolus infusion 1,000 mL (0 mL IntraVENous IV Completed 3/8/21 2335)   famotidine (PF) (PEPCID) injection 20 mg (20 mg IntraVENous Given 3/8/21 2234)   ondansetron (ZOFRAN) injection 4 mg (4 mg IntraVENous Given 3/8/21 2234)   mylanta/viscous lidocaine (GI COCKTAIL) (40 mL Oral Given 3/8/21 2234)         Medical Decision Making   I am the first provider for this patient. I reviewed the vital signs, available nursing notes, past medical history, past surgical history, family history and social history. Vital Signs-Reviewed the patient's vital signs. Pulse Oximetry Analysis - 100% on room air, not hypoxic     Cardiac Monitor:  Rate: 133 bpm  Rhythm: Sinus tachycardia    EKG interpretation: (Preliminary)  EKG read by Dr. Stephen Dueñas at 10:14 PM  Sinus tachycardia at a rate of 136 bpm, NM interval 130 ms, QRS duration of 88 ms, new ST depressions noted inferior and laterally when compared to prior from December 2020    Repeat EKG interpretation: (Preliminary)  EKG read by Dr. Stephen Dueñas at 1:48 AM  Accelerated junctional rhythm at a rate of 129 bpm, QRS duration of 96 ms, persistence of ST depressions laterally but some improvement in inferior ST depressions from prior    Records Reviewed: Nursing Notes, Old Medical Records and Previous electrocardiograms    Provider Notes (Medical Decision Making): Jenny Watson is a 77 y.o. female presenting for nausea and vomiting and elevated blood pressure that started today. Patient hypertensive on arrival and tachycardic. Patient has had EKG changes here varying from what appears to be a sinus tachycardia to an accelerated junctional rhythm with significant ST depressions that are different when compared to prior recent EKGs. Cardiac enzymes negative. D-dimer negative. Due to dynamic EKG changes discussed with hospitalist for further in-hospital observation on telemetry. Patient and her son understand and agree with this plan.     Procedures:  Procedures    ED Course:   1:54 AM  Updated patient on all results and plan. All questions answered. CONSULT NOTE:   2:08 AM  Dr. Julieta Schwartz spoke with Dr. Love Sage   Specialty: Hospitalist  Discussed pt's hx, disposition, and available diagnostic and imaging results over the telephone. Reviewed care plans. Telemetry observation. Diagnosis and Disposition     Critical Care Time: None    Core Measures:  For Hospitalized Patients:    1. Hospitalization Decision Time:  The decision to hospitalize the patient was made by Dr. Julieta Schwartz at 1:30 AM on 3/8/2021    2. Aspirin: Aspirin was not given because the patient did not present with a stroke at the time of their Emergency Department evaluation    1:54 AM  Patient is being admitted to the hospital by Dr. Love Sage. The results of their tests and reasons for their admission have been discussed with them and/or available family. They convey agreement and understanding for the need to be admitted and for their admission diagnosis. CONDITIONS ON ADMISSION:  Sepsis is not present at the time of admission. Deep Vein Thrombosis is not present at the time of admission. Thrombosis is not present at the time of admission. Urinary Tract Infection is not present at the time of admission. Pneumonia is not present at the time of admission. MRSA is not present at the time of admission. Wound infection is not present at the time of admission. Pressure Ulcer is not present at the time of admission. CLINICAL IMPRESSION:    1. Nausea and vomiting in adult    2. EKG abnormality      _______________________________      Please note that this dictation was completed with [employer], the computer voice recognition software. Quite often unanticipated grammatical, syntax, homophones, and other interpretive errors are inadvertently transcribed by the computer software. Please disregard these errors. Please excuse any errors that have escaped final proofreading.

## 2021-03-09 NOTE — PROGRESS NOTES
Pharmacy Renal Dosing Services:    Famotidine was automatically dose-adjusted per THE [facility] P&T Committee Protocol, with respect to renal function. Consult provided for this   77 y.o. , female , for the indication of SUP. Dose adjusted to:  Famotidine 20 mg po daily    Pt Weight:   Wt Readings from Last 1 Encounters:   03/08/21 70.3 kg (155 lb)     Previous Regimen    Famotidine 20 mg po twice daily   Serum Creatinine Lab Results   Component Value Date/Time    Creatinine 1.29 03/08/2021 10:50 PM       Creatinine Clearance Estimated Creatinine Clearance: 38.4 mL/min (based on SCr of 1.29 mg/dL). BUN Lab Results   Component Value Date/Time    BUN 22 (H) 03/08/2021 10:50 PM         Pharmacy to continue to monitor patient daily. Will make dosage adjustments based upon changing renal function.   Signed Bryson Rivera information:  [phone]

## 2021-03-09 NOTE — ED NOTES
Pt hourly rounding competed. Safety   Pt () resting on stretcher with side rails up and call bell in reach. () in chair    () in parents arms. Toileting   Pt offered ()Bedpan     ()Assistance to Restroom     ()Urinal  Ongoing Updates  Updated on plan of care and status of test results. Pain Management  Inquired as to comfort and offered comfort measures:    (x) warm blankets   (x) dimmed lights  Pt reports having difficulty sleeping.

## 2021-03-09 NOTE — H&P
History & Physical    Patient: Emelia Isaac MRN: [id number]  [facility]: [id number]    YOB: 1955  Age: 77 y.o. Sex: female      DOA: 3/8/2021  Primary Care Provider:  Constanza Washington MD      Assessment/Plan   Cardiac arrhythmia with abnormal EKG - initial troponin is negative, will trend cardiac enzymes, will order echocardiogram, will consult cardiology in AM, repleted K and Mag to get them closer to 4 and 2    Accelerated hypertension -resolving with admin of home meds before arrival, will resume home meds    Nausea & vomiting -resolved, may be cardiac related    DVT prophylaxis -lovenox     GI prophylaxis -pepcid     Spoke with daughter about her mom's care. Daughter is José Antonio Matamoros with phone number is [phone]. Daughter relate that her mother has been seeing Dr. Arnold Salcido as an outpatient and recently got an echocardiogram and a Holter monitor approximately 2 weeks ago. Consult to cardiology this morning -spoke with Dr. Arnold Salcido. Patient Active Problem List   Diagnosis Code    Choledocholithiasis K80.50    Hypomagnesemia E83.42    UTI (urinary tract infection) N39.0    Hypertension I10    Head injury S09.90XA    Abdominal pain, epigastric R10.13    Leukocytosis D72.829    Tachyarrhythmia R00.0    Feeling anxious R45.89    Accelerated hypertension I10    Nausea and vomiting R11.2       Estimated length of stay : Less than 48 hours    CC: Nausea and vomiting       HPI:     Emelia Isaac is a 77 y.o. female with past medical history of hypertension who presents to the emergency department complaining of nausea, vomiting, and elevated blood pressure. Per ED chart and ED physician, patient's son who was translating for patient at her request ( ED staff offered video  and patient declined), she had 3 episodes of vomiting this morning and when his sister took her blood pressure her systolic blood pressure was over 200.  In ED, pt's nausea has resolved on arrival to the ED. She denies any chest pain, shortness of breath, fever, cough, abdominal pain, bowel or urinary complaints. No known sick contacts or recent travel. Just prior to coming to the emergency department, she took her 3 evening antihypertensive agents. Pt very tearful about her diagnosis and wants me to call her daughter because she had a work up done by another cardiologist.     Past Medical History:   Diagnosis Date    Hypertension        No past surgical history on file. No family history on file. Social History     Socioeconomic History    Marital status:      Spouse name: Not on file    Number of children: Not on file    Years of education: Not on file    Highest education level: Not on file   Tobacco Use    Smoking status: Never Smoker    Smokeless tobacco: Never Used   Substance and Sexual Activity    Alcohol use: No    Drug use: Never       Prior to Admission medications    Medication Sig Start Date End Date Taking? Authorizing Provider   hydrALAZINE (APRESOLINE) 25 mg tablet Take 1 Tab by mouth three (3) times daily. 9/23/18   Juani Quintanilla MD   lisinopril (PRINIVIL, ZESTRIL) 20 mg tablet Take 1 Tab by mouth daily. 9/23/18   Juani Quintanilla MD   atenolol (TENORMIN) 100 mg tablet Take 1 Tab by mouth two (2) times a day. 9/23/18   Juani Quintanilla MD   amLODIPine (NORVASC) 10 mg tablet Take 1 Tab by mouth daily. 9/23/18   Juani Quintanilla MD       No Known Allergies    Review of Systems  Gen: No fever, chills, malaise, weight loss/gain. Heent: No headache, rhinorrhea, epistaxis, ear pain, hearing loss, sinus pain, neck pain/stiffness, sore throat. Heart: No chest pain, palpitations, PRUITT, pnd, or orthopnea. Resp: No cough, hemoptysis, wheezing and shortness of breath. GI: +nausea, vomiting, no diarrhea, constipation, melena or hematochezia. : No urinary obstruction, dysuria or hematuria. Derm: No rash, new skin lesion or pruritis. Musc/skeletal: no bone or joint complains. Vasc: No edema, cyanosis or claudication. Endo: No heat/cold intolerance, no polyuria,polydipsia or polyphagia. Neuro: No unilateral weakness, numbness, tingling. No seizures. Heme: No easy bruising or bleeding. Physical Exam:     Physical Exam:  Visit Vitals  BP (!) 169/88   Pulse (!) 128   Temp 98.4 °F (36.9 °C)   Resp 13   Ht 4' 11.06\" (1.5 m) Comment: documented on 10/7/2019   Wt 70.3 kg (155 lb)   SpO2 97%   BMI 31.25 kg/m²      O2 Device: Room air    Temp (24hrs), Av.4 °F (36.9 °C), Min:98.4 °F (36.9 °C), Max:98.4 °F (36.9 °C)    No intake/output data recorded. No intake/output data recorded. General:  Awake, cooperative, no distress. Head:  Normocephalic, without obvious abnormality, atraumatic. Eyes:  Conjunctivae/corneas clear, sclera anicteric, PERRL, EOMs intact. Nose: Nares normal. No drainage or sinus tenderness. Throat: Lips, mucosa, and tongue normal.    Neck: Supple, symmetrical, trachea midline, no adenopathy. Lungs:   Clear to auscultation bilaterally. Heart:  Regular rate and rhythm, S1, S2 normal, no murmur, click, rub or gallop. Abdomen: Soft, non-tender. Bowel sounds normal. No masses,  No organomegaly. Extremities: Extremities normal, atraumatic, no cyanosis or edema. Capillary refill normal.   Pulses: 2+ and symmetric all extremities. Skin: Skin color as per ethnicity, turgor normal. No rashes or lesions   Neurologic: CNII-XII intact. No focal motor or sensory deficit.        Labs Reviewed:    Recent Results (from the past 24 hour(s))   EKG, 12 LEAD, INITIAL    Collection Time: 21 10:10 PM   Result Value Ref Range    Ventricular Rate 136 BPM    Atrial Rate 136 BPM    P-R Interval 130 ms    QRS Duration 88 ms    Q-T Interval 332 ms    QTC Calculation (Bezet) 499 ms    Calculated R Axis 75 degrees    Calculated T Axis -50 degrees    Diagnosis       Sinus tachycardia  Marked ST abnormality, possible inferior subendocardial injury  Abnormal ECG  When compared with ECG of 03-DEC-2020 21:56,  ST now depressed in Inferior leads  ST more depressed in Anterolateral leads  T wave inversion now evident in Inferior leads  T wave inversion now evident in Lateral leads     CBC WITH AUTOMATED DIFF    Collection Time: 03/08/21 10:20 PM   Result Value Ref Range    WBC 11.9 4.6 - 13.2 K/uL    RBC 3.78 (L) 4.20 - 5.30 M/uL    HGB 10.2 (L) 12.0 - 16.0 g/dL    HCT 32.6 (L) 35.0 - 45.0 %    MCV 86.2 74.0 - 97.0 FL    MCH 27.0 24.0 - 34.0 PG    MCHC 31.3 31.0 - 37.0 g/dL    RDW 13.0 11.6 - 14.5 %    PLATELET 697 523 - 556 K/uL    MPV 10.3 9.2 - 11.8 FL    NEUTROPHILS 83 (H) 40 - 73 %    LYMPHOCYTES 12 (L) 21 - 52 %    MONOCYTES 5 3 - 10 %    EOSINOPHILS 0 0 - 5 %    BASOPHILS 0 0 - 2 %    ABS. NEUTROPHILS 9.9 (H) 1.8 - 8.0 K/UL    ABS. LYMPHOCYTES 1.4 0.9 - 3.6 K/UL    ABS. MONOCYTES 0.7 0.05 - 1.2 K/UL    ABS. EOSINOPHILS 0.0 0.0 - 0.4 K/UL    ABS. BASOPHILS 0.0 0.0 - 0.1 K/UL    DF AUTOMATED     MAGNESIUM    Collection Time: 03/08/21 10:50 PM   Result Value Ref Range    Magnesium 1.8 1.6 - 2.6 mg/dL   METABOLIC PANEL, COMPREHENSIVE    Collection Time: 03/08/21 10:50 PM   Result Value Ref Range    Sodium 141 136 - 145 mmol/L    Potassium 3.5 3.5 - 5.5 mmol/L    Chloride 108 100 - 111 mmol/L    CO2 24 21 - 32 mmol/L    Anion gap 9 3.0 - 18 mmol/L    Glucose 137 (H) 74 - 99 mg/dL    BUN 22 (H) 7.0 - 18 MG/DL    Creatinine 1.29 0.6 - 1.3 MG/DL    BUN/Creatinine ratio 17 12 - 20      GFR est AA 50 (L) >60 ml/min/1.73m2    GFR est non-AA 41 (L) >60 ml/min/1.73m2    Calcium 8.9 8.5 - 10.1 MG/DL    Bilirubin, total 0.3 0.2 - 1.0 MG/DL    ALT (SGPT) 19 13 - 56 U/L    AST (SGOT) 16 10 - 38 U/L    Alk.  phosphatase 84 45 - 117 U/L    Protein, total 7.6 6.4 - 8.2 g/dL    Albumin 4.0 3.4 - 5.0 g/dL    Globulin 3.6 2.0 - 4.0 g/dL    A-G Ratio 1.1 0.8 - 1.7     CARDIAC PANEL,(CK, CKMB & TROPONIN)    Collection Time: 03/08/21 10:50 PM Result Value Ref Range    CK - MB 2.2 <3.6 ng/ml    CK-MB Index 0.9 0.0 - 4.0 %     (H) 26 - 192 U/L    Troponin-I, QT <0.02 0.0 - 0.045 NG/ML   LIPASE    Collection Time: 03/08/21 10:50 PM   Result Value Ref Range    Lipase 284 73 - 393 U/L   D DIMER    Collection Time: 03/08/21 10:50 PM   Result Value Ref Range    D DIMER 0.42 <0.46 ug/ml(FEU)   URINALYSIS W/ RFLX MICROSCOPIC    Collection Time: 03/09/21 12:30 AM   Result Value Ref Range    Color YELLOW      Appearance CLEAR      Specific gravity 1.006 1.005 - 1.030      pH (UA) 6.5 5.0 - 8.0      Protein TRACE (A) NEG mg/dL    Glucose Negative NEG mg/dL    Ketone Negative NEG mg/dL    Bilirubin Negative NEG      Blood Negative NEG      Urobilinogen 0.2 0.2 - 1.0 EU/dL    Nitrites Negative NEG      Leukocyte Esterase Negative NEG     URINE MICROSCOPIC ONLY    Collection Time: 03/09/21 12:30 AM   Result Value Ref Range    WBC Negative 0 - 5 /hpf    RBC Negative 0 - 5 /hpf    Epithelial cells FEW 0 - 5 /lpf    Bacteria Negative NEG /hpf   EKG, 12 LEAD, SUBSEQUENT    Collection Time: 03/09/21  1:48 AM   Result Value Ref Range    Ventricular Rate 129 BPM    Atrial Rate 129 BPM    QRS Duration 96 ms    Q-T Interval 330 ms    QTC Calculation (Bezet) 483 ms    Calculated R Axis 79 degrees    Calculated T Axis 11 degrees    Diagnosis       Accelerated Junctional rhythm  Incomplete right bundle branch block  Nonspecific ST abnormality  Abnormal ECG  When compared with ECG of 08-MAR-2021 22:10,  Junctional rhythm has replaced Sinus rhythm  ST less depressed in Anterior leads  T wave inversion no longer evident in Inferior leads  T wave inversion no longer evident in Lateral leads         Procedures/imaging: see electronic medical records for all procedures/Xrays and details which were not copied into this note but were reviewed prior to creation of Plan        CC:  Micheal Camara MD

## 2021-03-09 NOTE — ROUTINE PROCESS
TRANSFER - IN REPORT: 
 
Verbal report received from ANTIONETTE Dao RN(name) on Peggi Minium  being received from ED(unit) for routine progression of care Report consisted of patients Situation, Background, Assessment and  
Recommendations(SBAR). Information from the following report(s) SBAR, Kardex, ED Summary, MAR, Accordion, Recent Results and Cardiac Rhythm Junctional was reviewed with the receiving nurse. Opportunity for questions and clarification was provided. Assessment completed upon patients arrival to unit and care assumed.

## 2021-03-09 NOTE — ED NOTES
TRANSFER - OUT REPORT:    Verbal report given to Joe Gabriel RN (name) on Kitty Lou  being transferred to [facility] (unit) for routine progression of care       Report consisted of patients Situation, Background, Assessment and   Recommendations(SBAR). Information from the following report(s) SBAR, ED Summary, [facility] and Cardiac Rhythm Accelerated Junctional was reviewed with the receiving nurse. Lines:   Peripheral IV 03/08/21 Left Antecubital (Active)   Site Assessment Clean, dry, & intact 03/08/21 2227   Phlebitis Assessment 0 03/08/21 2227   Infiltration Assessment 0 03/08/21 2227   Dressing Status Clean, dry, & intact 03/08/21 2227   Dressing Type 4 X 4 03/08/21 2227   Hub Color/Line Status Pink 03/08/21 2227   Action Taken Blood drawn 03/08/21 2227        Opportunity for questions and clarification was provided. Patient transported with:   Monitor  Registered Nurse, to floor AOx4, ambulatory with a steady gait.      Pt to be medicated, and repeat cardiac enzymes to be drawn prior to transfer to [facility]

## 2021-03-10 ENCOUNTER — APPOINTMENT (OUTPATIENT)
Dept: NUCLEAR MEDICINE | Age: 66
DRG: 310 | End: 2021-03-10
Attending: INTERNAL MEDICINE
Payer: MEDICARE

## 2021-03-10 LAB
ANION GAP SERPL CALC-SCNC: 5 MMOL/L (ref 3–18)
AV VELOCITY RATIO: 0.88
AV VTI RATIO: 0.8
BUN SERPL-MCNC: 30 MG/DL (ref 7–18)
BUN/CREAT SERPL: 21 (ref 12–20)
CALCIUM SERPL-MCNC: 9.1 MG/DL (ref 8.5–10.1)
CHLORIDE SERPL-SCNC: 108 MMOL/L (ref 100–111)
CO2 SERPL-SCNC: 29 MMOL/L (ref 21–32)
CREAT SERPL-MCNC: 1.45 MG/DL (ref 0.6–1.3)
ECHO AV ANNULUS DIAM: 2.25 CM
ECHO AV AREA PEAK VELOCITY: 2.8 CM2
ECHO AV AREA VTI: 2.7 CM2
ECHO AV AREA/BSA PEAK VELOCITY: 1.7 CM2/M2
ECHO AV AREA/BSA VTI: 1.6 CM2/M2
ECHO AV MEAN GRADIENT: 8 MMHG
ECHO AV MEAN VELOCITY: 1.39 M/S
ECHO AV PEAK GRADIENT: 11.6 MMHG
ECHO AV PEAK VELOCITY: 170.22 CM/S
ECHO AV VTI: 38.78 CM
ECHO EST RA PRESSURE: 3 MMHG
ECHO IVC PROX: 1.3 CM
ECHO LA AREA 2C: 22.77 CM2
ECHO LA AREA 4C: 21.2 CM2
ECHO LA MAJOR AXIS: 4.02 CM
ECHO LA MINOR AXIS: 2.43 CM
ECHO LA VOL 2C: 65.82 ML (ref 22–52)
ECHO LA VOL 4C: 56.38 ML (ref 22–52)
ECHO LA VOL BP: 65.28 ML (ref 22–52)
ECHO LA VOLUME INDEX A2C: 39.78 ML/M2 (ref 16–28)
ECHO LA VOLUME INDEX A4C: 34.07 ML/M2 (ref 16–28)
ECHO LV E' LATERAL VELOCITY: 8 CM/S
ECHO LV E' SEPTAL VELOCITY: 7 CM/S
ECHO LV EDV A2C: 66.8 ML
ECHO LV EDV A4C: 99.9 ML
ECHO LV EDV BP: 84.2 ML (ref 56–104)
ECHO LV EDV INDEX A4C: 60.4 ML/M2
ECHO LV EDV INDEX BP: 50.9 ML/M2
ECHO LV EDV NDEX A2C: 40.4 ML/M2
ECHO LV EDV TEICHHOLZ: 0.69 ML
ECHO LV EJECTION FRACTION A2C: 60 %
ECHO LV EJECTION FRACTION A4C: 69 %
ECHO LV EJECTION FRACTION BIPLANE: 65.1 % (ref 55–100)
ECHO LV ESV A2C: 26.6 ML
ECHO LV ESV A4C: 31 ML
ECHO LV ESV BP: 29.4 ML (ref 19–49)
ECHO LV ESV INDEX A2C: 16.1 ML/M2
ECHO LV ESV INDEX A4C: 18.7 ML/M2
ECHO LV ESV INDEX BP: 17.8 ML/M2
ECHO LV ESV TEICHHOLZ: 0.25 ML
ECHO LV INTERNAL DIMENSION DIASTOLIC: 4.72 CM (ref 3.9–5.3)
ECHO LV INTERNAL DIMENSION SYSTOLIC: 3.09 CM
ECHO LV IVSD: 0.93 CM (ref 0.6–0.9)
ECHO LV MASS 2D: 148 G (ref 67–162)
ECHO LV MASS INDEX 2D: 89.4 G/M2 (ref 43–95)
ECHO LV POSTERIOR WALL DIASTOLIC: 0.91 CM (ref 0.6–0.9)
ECHO LV POSTERIOR WALL SYSTOLIC: 0 CM
ECHO LVOT CARDIAC OUTPUT: 22.6 L/MIN
ECHO LVOT DIAM: 2.02 CM
ECHO LVOT PEAK GRADIENT: 9 MMHG
ECHO LVOT PEAK VELOCITY: 150.28 CM/S
ECHO LVOT SV: 105.4 ML
ECHO LVOT VTI: 32.74 CM
ECHO MV A VELOCITY: 80.28 CM/S
ECHO MV AREA PHT: 5.4 CM2
ECHO MV E DECELERATION TIME (DT): 139.8 MS
ECHO MV E VELOCITY: 162.74 CM/S
ECHO MV E/A RATIO: 2.03
ECHO MV E/E' LATERAL: 20.34
ECHO MV E/E' RATIO (AVERAGED): 21.8
ECHO MV E/E' SEPTAL: 23.25
ECHO MV MEAN INFLOW VELOCITY: 4.92 M/S
ECHO MV PRESSURE HALF TIME (PHT): 40.6 MS
ECHO MV REGURGITANT PEAK GRADIENT: 146.6 MMHG
ECHO MV REGURGITANT PEAK VELOCITY: 605.45 CM/S
ECHO MV REGURGITANT VTIA: 191.55 CM
ECHO PV REGURGITANT MAX VELOCITY: 605 CM/S
ECHO RA AREA 4C: 17.22 CM2
ECHO RA VOLUME: 48.2 ML
ECHO RV INTERNAL DIMENSION: 3.29 CM
ECHO RV TAPSE: 2.3 CM (ref 1.5–2)
ECHO TV MEAN GRADIENT: 102.44 MMHG
ECHO TV REGURGITANT MAX VELOCITY: 277.79 CM/S
ECHO TV REGURGITANT PEAK GRADIENT: 30.9 MMHG
ERYTHROCYTE [DISTWIDTH] IN BLOOD BY AUTOMATED COUNT: 13.1 % (ref 11.6–14.5)
GLUCOSE SERPL-MCNC: 121 MG/DL (ref 74–99)
HCT VFR BLD AUTO: 29.2 % (ref 35–45)
HGB BLD-MCNC: 8.9 G/DL (ref 12–16)
LVFS 2D: 34.45 %
LVOT MG: 4.84 MMHG
LVOT MV: 1 CM/S
LVSV (MOD BI): 32.03 ML
LVSV (MOD SINGLE 4C): 40.28 ML
LVSV (MOD SINGLE): 23.47 ML
LVSV (TEICH): 38.31 ML
MAGNESIUM SERPL-MCNC: 2.2 MG/DL (ref 1.6–2.6)
MCH RBC QN AUTO: 27.1 PG (ref 24–34)
MCHC RBC AUTO-ENTMCNC: 30.5 G/DL (ref 31–37)
MCV RBC AUTO: 88.8 FL (ref 74–97)
MV DEC SLOPE: 11.64
PLATELET # BLD AUTO: 259 K/UL (ref 135–420)
PMV BLD AUTO: 9.6 FL (ref 9.2–11.8)
POTASSIUM SERPL-SCNC: 4 MMOL/L (ref 3.5–5.5)
RBC # BLD AUTO: 3.29 M/UL (ref 4.2–5.3)
SODIUM SERPL-SCNC: 142 MMOL/L (ref 136–145)
WBC # BLD AUTO: 7.1 K/UL (ref 4.6–13.2)

## 2021-03-10 PROCEDURE — 80048 BASIC METABOLIC PNL TOTAL CA: CPT

## 2021-03-10 PROCEDURE — 74011250637 HC RX REV CODE- 250/637: Performed by: FAMILY MEDICINE

## 2021-03-10 PROCEDURE — 85027 COMPLETE CBC AUTOMATED: CPT

## 2021-03-10 PROCEDURE — 74011250636 HC RX REV CODE- 250/636: Performed by: FAMILY MEDICINE

## 2021-03-10 PROCEDURE — 74011250637 HC RX REV CODE- 250/637: Performed by: HOSPITALIST

## 2021-03-10 PROCEDURE — 83735 ASSAY OF MAGNESIUM: CPT

## 2021-03-10 PROCEDURE — 96375 TX/PRO/DX INJ NEW DRUG ADDON: CPT

## 2021-03-10 PROCEDURE — 36415 COLL VENOUS BLD VENIPUNCTURE: CPT

## 2021-03-10 PROCEDURE — 96372 THER/PROPH/DIAG INJ SC/IM: CPT

## 2021-03-10 PROCEDURE — 99218 HC RM OBSERVATION: CPT

## 2021-03-10 PROCEDURE — 74011250636 HC RX REV CODE- 250/636: Performed by: RADIOLOGY

## 2021-03-10 RX ORDER — MORPHINE SULFATE 4 MG/ML
3 INJECTION INTRAVENOUS ONCE
Status: DISCONTINUED | OUTPATIENT
Start: 2021-03-10 | End: 2021-03-10

## 2021-03-10 RX ORDER — MORPHINE SULFATE 4 MG/ML
3 INJECTION INTRAVENOUS ONCE
Status: COMPLETED | OUTPATIENT
Start: 2021-03-10 | End: 2021-03-10

## 2021-03-10 RX ADMIN — LISINOPRIL 20 MG: 20 TABLET ORAL at 08:19

## 2021-03-10 RX ADMIN — ATORVASTATIN CALCIUM 40 MG: 20 TABLET, FILM COATED ORAL at 08:20

## 2021-03-10 RX ADMIN — MORPHINE SULFATE 3 MG: 4 INJECTION INTRAVENOUS at 15:05

## 2021-03-10 RX ADMIN — FAMOTIDINE 20 MG: 20 TABLET ORAL at 08:19

## 2021-03-10 RX ADMIN — SPIRONOLACTONE 25 MG: 25 TABLET ORAL at 08:19

## 2021-03-10 RX ADMIN — AMLODIPINE BESYLATE 10 MG: 5 TABLET ORAL at 08:19

## 2021-03-10 RX ADMIN — LOSARTAN POTASSIUM 100 MG: 50 TABLET, FILM COATED ORAL at 08:19

## 2021-03-10 RX ADMIN — ATENOLOL 100 MG: 50 TABLET ORAL at 08:20

## 2021-03-10 RX ADMIN — ENOXAPARIN SODIUM 40 MG: 100 INJECTION SUBCUTANEOUS at 08:20

## 2021-03-10 RX ADMIN — ATENOLOL 100 MG: 50 TABLET ORAL at 21:07

## 2021-03-10 NOTE — CONSULTS
[facility] Consult Note      Patient: Stuart Spivey MRN: [id number]  SSN: [id number]    YOB: 1955  Age: 77 y.o. Sex: female    Date of Consultation: 03/09/2021  Referring Physician: Luis Rhoades   Reason for Consultation: SVT    Chief complain: Vomiting    HPI:  69-year-old female came to emergency room with complaining of vomiting. She had episode of vomiting after other some tingling in right hand. After that her blood pressure starting starting up and started having palpitation and she came to emergency room. She denies any chest pain. She feels better today. She is complaining of shortness of breath on exertion. She denies any orthopnea or PND. She denies any dizziness, presyncope or syncope. Past Medical History:   Diagnosis Date    Hypertension      No past surgical history on file.   Current Facility-Administered Medications   Medication Dose Route Frequency    sodium chloride (NS) flush 5-40 mL  5-40 mL IntraVENous Q8H    sodium chloride (NS) flush 5-40 mL  5-40 mL IntraVENous PRN    acetaminophen (TYLENOL) tablet 650 mg  650 mg Oral Q6H PRN    Or    acetaminophen (TYLENOL) suppository 650 mg  650 mg Rectal Q6H PRN    polyethylene glycol (MIRALAX) packet 17 g  17 g Oral DAILY PRN    promethazine (PHENERGAN) tablet 12.5 mg  12.5 mg Oral Q6H PRN    Or    ondansetron (ZOFRAN) injection 4 mg  4 mg IntraVENous Q6H PRN    enoxaparin (LOVENOX) injection 40 mg  40 mg SubCUTAneous DAILY    famotidine (PEPCID) tablet 20 mg  20 mg Oral DAILY    LORazepam (ATIVAN) injection 1 mg  1 mg IntraVENous Q4H PRN    amLODIPine (NORVASC) tablet 10 mg  10 mg Oral DAILY    atenoloL (TENORMIN) tablet 100 mg  100 mg Oral BID    atorvastatin (LIPITOR) tablet 40 mg  40 mg Oral DAILY    lisinopriL (PRINIVIL, ZESTRIL) tablet 20 mg  20 mg Oral DAILY    spironolactone (ALDACTONE) tablet 25 mg  25 mg Oral DAILY    losartan (COZAAR) tablet 100 mg  100 mg Oral DAILY     Facility-Administered Medications Ordered in Other Encounters   Medication Dose Route Frequency    esmolol (BREVIBLOC) 100 mg/10 mL (10 mg/mL) injection   IntraVENous PRN    fentaNYL citrate (PF) injection   IntraVENous PRN    midazolam (VERSED) injection   IntraVENous PRN    lidocaine (PF) (XYLOCAINE) 20 mg/mL (2 %) injection   IntraVENous PRN    propofol (DIPRIVAN) 10 mg/mL injection   IntraVENous PRN    rocuronium (ZEMURON) injection   IntraVENous PRN    PHENYLephrine (KELSEY-SYNEPHRINE) 10,000 mcg in 0.9% sodium chloride 100 mL infusion  10,000 mcg IntraVENous CONTINUOUS    glucagon (GLUCAGEN) injection   IntraVENous PRN    ondansetron (ZOFRAN) injection    PRN    lactated Ringers infusion   IntraVENous CONTINUOUS    glycopyrrolate (ROBINUL) injection   IntraVENous PRN    neostigmine methylsulfate (PROSTIGMINE) 1 mg/mL syringe   IntraVENous PRN    ketorolac (TORADOL) injection   IntraVENous PRN       Allergies and Intolerances:   No Known Allergies    Family History:   No family history on file. Social History:   She  reports that she has never smoked. She has never used smokeless tobacco.  She  reports no history of alcohol use. Review of Systems:     Gen: No fever, chills, malaise, weight loss/gain. Heent: No headache, rhinorrhea, epistaxis, ear pain, hearing loss, sinus pain, neck pain/stiffness, sore throat. Heart: No chest pain, Positive palpitations, shortness of breath on exertion, No pnd, or orthopnea. Resp: No cough, hemoptysis, wheezing and dyspnea  GI: No nausea, Positive vomiting, No diarrhea, constipation, melena or hematochezia. : No urinary obstruction, dysuria or hematuria. Derm: No rash, new skin lesion or pruritis. Musc/skeletal: no bone or joint complains. Vasc: No edema, cyanosis or claudication. Endo: No heat/cold intolerance, no polyuria,polydipsia or polyphagia. Neuro: No unilateral weakness, numbness, tingling. No seizures.    Heme: No easy bruising or bleeding. Physical:   Patient Vitals for the past 6 hrs:   Temp Pulse Resp BP SpO2   03/10/21 0725 97.8 °F (36.6 °C) 64 18 135/72 99 %   03/10/21 0506 98.2 °F (36.8 °C) (!) 53 20 (!) 114/49 98 %         Exam:   General Appearance: Comfortable, not using accessory muscles of respiration. HEENT: EUNICE. HEAD: Atraumatic  NECK: No JVD, no thyroidomeglay. CAROTIDS: No bruit  LUNGS: Clear bilaterally. HEART: S1+S2 audible, no murmur, no pericardial rub. ABD: Non-tender, BS Audible    EXT: No edema, and no cysnosis. VASCULAR EXAM: Pulses are intact. PSYCHIATRIC EXAM: Mood is appropriate. MUSCULOSKELETAL: Grossly no joint deformity.   NEUROLOGICAL: AAO times 3, Motor and sensory sytem intact    Review of Data:   LABS:   Lab Results   Component Value Date/Time    WBC 7.1 03/10/2021 03:40 AM    HGB 8.9 (L) 03/10/2021 03:40 AM    HCT 29.2 (L) 03/10/2021 03:40 AM    PLATELET 698 40/69/8085 03:40 AM     Lab Results   Component Value Date/Time    Sodium 142 03/10/2021 03:40 AM    Potassium 4.0 03/10/2021 03:40 AM    Chloride 108 03/10/2021 03:40 AM    CO2 29 03/10/2021 03:40 AM    Glucose 121 (H) 03/10/2021 03:40 AM    BUN 30 (H) 03/10/2021 03:40 AM    Creatinine 1.45 (H) 03/10/2021 03:40 AM     Lab Results   Component Value Date/Time    Cholesterol, total 193 09/19/2018 05:00 AM    HDL Cholesterol 14 (L) 09/19/2018 05:00 AM    LDL, calculated 138.8 (H) 09/19/2018 05:00 AM    Triglyceride 201 (H) 09/19/2018 05:00 AM     No components found for: GPT  Lab Results   Component Value Date/Time    Hemoglobin A1c 6.2 (H) 09/19/2018 05:00 AM         Cardiology Procedures:   Results for orders placed or performed during the hospital encounter of 03/08/21   EKG, 12 LEAD, INITIAL   Result Value Ref Range    Ventricular Rate 136 BPM    Atrial Rate 136 BPM    P-R Interval 130 ms    QRS Duration 88 ms    Q-T Interval 332 ms    QTC Calculation (Bezet) 499 ms    Calculated R Axis 75 degrees    Calculated T Axis -50 degrees Diagnosis       Supraventricular tachycardia  Marked ST abnormality, possible inferior subendocardial injury  Abnormal ECG               Impression / Plan:    Patient Active Problem List   Diagnosis Code    Choledocholithiasis K80.50    Hypomagnesemia E83.42    UTI (urinary tract infection) N39.0    Hypertension I10    Head injury S09.90XA    Abdominal pain, epigastric R10.13    Leukocytosis D72.829    Tachyarrhythmia R00.0    Feeling anxious R45.89    Accelerated hypertension I10    Nausea and vomiting R11.2     PSVT      18-year-old female came with complaining of vomiting, right-sided tingling and numbness, high blood pressure and palpitation. EKG revealed narrow complex tachycardia. Paroxysmal junctional reciprocating tachycardia cannot be ruled out.     serial cardiac enzymes and negative. Echocardiogram revealed no wall motion abnormality and normal LVEF. Continue atenolol 100 mg by mouth twice a day. Continue current management. Plan discussed with patient and family member.     Advised about limited caffeine intake        Signed By: Matt Cobian MD     March 10, 2021

## 2021-03-10 NOTE — ROUTINE PROCESS
The documentation for this period is being entered following the guidelines as defined in the [facility] policy by Mumtaz Uribe RN.

## 2021-03-10 NOTE — PROGRESS NOTES
Hospitalist Progress Note    Patient: Tee Rankin MRN: [id number]  CSN: [id number]    YOB: 1955  Age: 77 y.o. Sex: female    DOA: 3/8/2021 LOS:  LOS: 0 days          Chief Complaint:          Assessment/Plan   1. Cardiac arrythmia  Medication adjusted per cardilogy   Troponin negative  2. choleystitis  Check hida scan  Advance diet as tolerated    Patient Active Problem List   Diagnosis Code    Choledocholithiasis K80.50    Hypomagnesemia E83.42    UTI (urinary tract infection) N39.0    Hypertension I10    Head injury S09.90XA    Abdominal pain, epigastric R10.13    Leukocytosis D72.829    Tachyarrhythmia R00.0    Feeling anxious R45.89    Accelerated hypertension I10    Nausea and vomiting R11.2       Subjective:    Nausea and vomitting resolved  Troponin negative  Await cardio input  ? choleystitis    Review of systems:    Constitutional: denies fevers, chills, myalgias  Respiratory: denies SOB, cough  Cardiovascular: denies chest pain, palpitations  Gastrointestinal: denies nausea, vomiting, diarrhea      Vital signs/Intake and Output:  Visit Vitals  /72 (BP 1 Location: Right arm, BP Patient Position: Sitting)   Pulse 64   Temp 97.8 °F (36.6 °C)   Resp 18   Ht 4' 11\" (1.499 m)   Wt 69.1 kg (152 lb 4.8 oz)   SpO2 99%   BMI 30.76 kg/m²     Current Shift:  No intake/output data recorded. Last three shifts:  No intake/output data recorded.     Exam:    General: Well developed, alert, NAD, OX3  Head/Neck: NCAT, supple, No masses, No lymphadenopathy  CVS:Regular rate and rhythm, no M/R/G, S1/S2 heard, no thrill  Lungs:Clear to auscultation bilaterally, no wheezes, rhonchi, or rales  Abdomen: Soft, Nontender, No distention, Normal Bowel sounds, No hepatomegaly  Extremities: No C/C/E, pulses palpable 2+  Skin:normal texture and turgor, no rashes, no lesions  Neuro:grossly normal , follows commands  Psych:appropriate                Labs: Results:       Chemistry Recent Labs 03/10/21  0340 03/09/21  0418 03/08/21  2250   * 106* 137*    142 141   K 4.0 3.4* 3.5    110 108   CO2 29 25 24   BUN 30* 19* 22*   CREA 1.45* 1.18 1.29   CA 9.1 8.9 8.9   AGAP 5 7 9   BUCR 21* 16 17   AP  --   --  84   TP  --   --  7.6   ALB  --   --  4.0   GLOB  --   --  3.6   AGRAT  --   --  1.1      CBC w/Diff Recent Labs     03/10/21  0340 03/09/21  1120 03/08/21  2220   WBC 7.1 12.2 11.9   RBC 3.29* 3.57* 3.78*   HGB 8.9* 10.1* 10.2*   HCT 29.2* 31.1* 32.6*    338 397   GRANS  --   --  83*   LYMPH  --   --  12*   EOS  --   --  0      Cardiac Enzymes Recent Labs     03/09/21 0418 03/08/21  2250   * 241*   CKND1 1.2 0.9      Coagulation No results for input(s): PTP, INR, APTT, INREXT in the last 72 hours. Lipid Panel Lab Results   Component Value Date/Time    Cholesterol, total 193 09/19/2018 05:00 AM    HDL Cholesterol 14 (L) 09/19/2018 05:00 AM    LDL, calculated 138.8 (H) 09/19/2018 05:00 AM    VLDL, calculated 40.2 09/19/2018 05:00 AM    Triglyceride 201 (H) 09/19/2018 05:00 AM    CHOL/HDL Ratio 13.8 (H) 09/19/2018 05:00 AM      BNP No results for input(s): BNPP in the last 72 hours.    Liver Enzymes Recent Labs     03/08/21  2250   TP 7.6   ALB 4.0   AP 84      Thyroid Studies Lab Results   Component Value Date/Time    TSH 2.76 12/03/2020 08:27 PM        Procedures/imaging: see electronic medical records for all procedures/Xrays and details which were not copied into this note but were reviewed prior to creation of Judith Patiño MD

## 2021-03-10 NOTE — ROUTINE PROCESS
Bedside and Verbal shift change report given to [address] RN  (oncoming nurse) by Mary Newman RN  (offgoing nurse). Report given with SBAR, Kardex, Intake/Output and Recent Results.

## 2021-03-10 NOTE — PROGRESS NOTES
Problem: Patient Education: Go to Patient Education Activity  Goal: Patient/Family Education  Outcome: Progressing Towards Goal     Problem: Falls - Risk of  Goal: *Absence of Falls  Description: Document Margot Salas Fall Risk and appropriate interventions in the flowsheet.   Outcome: Progressing Towards Goal  Note: Fall Risk Interventions:            Medication Interventions: Teach patient to arise slowly, Patient to call before getting OOB, Evaluate medications/consider consulting pharmacy                   Problem: Patient Education: Go to Patient Education Activity  Goal: Patient/Family Education  Outcome: Progressing Towards Goal

## 2021-03-10 NOTE — PROGRESS NOTES
[id number] Shift Summary: Pt rested well overnight with no complaints. No new clinical concerns noted.      Nightshift Chart Audit Completed

## 2021-03-11 VITALS
SYSTOLIC BLOOD PRESSURE: 157 MMHG | BODY MASS INDEX: 30.7 KG/M2 | DIASTOLIC BLOOD PRESSURE: 57 MMHG | WEIGHT: 152.3 LBS | HEART RATE: 69 BPM | HEIGHT: 59 IN | RESPIRATION RATE: 18 BRPM | TEMPERATURE: 98 F | OXYGEN SATURATION: 100 %

## 2021-03-11 PROBLEM — I49.9 ARRHYTHMIA: Status: ACTIVE | Noted: 2021-03-11

## 2021-03-11 LAB
ANION GAP SERPL CALC-SCNC: 6 MMOL/L (ref 3–18)
BUN SERPL-MCNC: 26 MG/DL (ref 7–18)
BUN/CREAT SERPL: 20 (ref 12–20)
CALCIUM SERPL-MCNC: 8.9 MG/DL (ref 8.5–10.1)
CHLORIDE SERPL-SCNC: 107 MMOL/L (ref 100–111)
CO2 SERPL-SCNC: 28 MMOL/L (ref 21–32)
CREAT SERPL-MCNC: 1.31 MG/DL (ref 0.6–1.3)
ERYTHROCYTE [DISTWIDTH] IN BLOOD BY AUTOMATED COUNT: 12.7 % (ref 11.6–14.5)
GLUCOSE SERPL-MCNC: 121 MG/DL (ref 74–99)
HCT VFR BLD AUTO: 29.3 % (ref 35–45)
HGB BLD-MCNC: 9.4 G/DL (ref 12–16)
MAGNESIUM SERPL-MCNC: 1.9 MG/DL (ref 1.6–2.6)
MCH RBC QN AUTO: 27.9 PG (ref 24–34)
MCHC RBC AUTO-ENTMCNC: 32.1 G/DL (ref 31–37)
MCV RBC AUTO: 86.9 FL (ref 74–97)
PLATELET # BLD AUTO: 288 K/UL (ref 135–420)
PMV BLD AUTO: 9.8 FL (ref 9.2–11.8)
POTASSIUM SERPL-SCNC: 4 MMOL/L (ref 3.5–5.5)
RBC # BLD AUTO: 3.37 M/UL (ref 4.2–5.3)
SODIUM SERPL-SCNC: 141 MMOL/L (ref 136–145)
WBC # BLD AUTO: 8.3 K/UL (ref 4.6–13.2)

## 2021-03-11 PROCEDURE — 74011250636 HC RX REV CODE- 250/636: Performed by: FAMILY MEDICINE

## 2021-03-11 PROCEDURE — 80048 BASIC METABOLIC PNL TOTAL CA: CPT

## 2021-03-11 PROCEDURE — 85027 COMPLETE CBC AUTOMATED: CPT

## 2021-03-11 PROCEDURE — 36415 COLL VENOUS BLD VENIPUNCTURE: CPT

## 2021-03-11 PROCEDURE — 83735 ASSAY OF MAGNESIUM: CPT

## 2021-03-11 PROCEDURE — 74011250637 HC RX REV CODE- 250/637: Performed by: FAMILY MEDICINE

## 2021-03-11 PROCEDURE — 96372 THER/PROPH/DIAG INJ SC/IM: CPT

## 2021-03-11 PROCEDURE — 74011250637 HC RX REV CODE- 250/637: Performed by: HOSPITALIST

## 2021-03-11 PROCEDURE — 65270000029 HC RM PRIVATE

## 2021-03-11 PROCEDURE — 99218 HC RM OBSERVATION: CPT

## 2021-03-11 RX ADMIN — ENOXAPARIN SODIUM 40 MG: 100 INJECTION SUBCUTANEOUS at 09:00

## 2021-03-11 RX ADMIN — LISINOPRIL 20 MG: 20 TABLET ORAL at 08:48

## 2021-03-11 RX ADMIN — ATORVASTATIN CALCIUM 40 MG: 20 TABLET, FILM COATED ORAL at 08:48

## 2021-03-11 RX ADMIN — AMLODIPINE BESYLATE 10 MG: 5 TABLET ORAL at 08:48

## 2021-03-11 RX ADMIN — ATENOLOL 100 MG: 50 TABLET ORAL at 08:49

## 2021-03-11 RX ADMIN — FAMOTIDINE 20 MG: 20 TABLET ORAL at 08:48

## 2021-03-11 RX ADMIN — SPIRONOLACTONE 25 MG: 25 TABLET ORAL at 08:48

## 2021-03-11 NOTE — ROUTINE PROCESS
Bedside and Verbal shift change report given to MICHELE Wright  (oncoming nurse) by Noreen Sommer  (offgoing nurse). Report included the following information SBAR, Kardex, Procedure Summary, Intake/Output, MAR and Recent Results.

## 2021-03-11 NOTE — PROGRESS NOTES
Hospitalist Progress Note    Patient: Obdulio Warren MRN: [id number]  CSN: [id number]    YOB: 1955  Age: 77 y.o.   Sex: female    DOA: 3/8/2021 LOS:  LOS: 0 days          See dc summary

## 2021-03-11 NOTE — PROGRESS NOTES
Bedside and Verbal shift change report given to MIRI Hopkins RN (oncoming nurse) by YE Alvarado RN (offgoing nurse). Report included the following information SBAR, Kardex, Intake/Output, MAR and Recent Results.

## 2021-03-11 NOTE — PROGRESS NOTES
0720: Assumed patient care from off going [address]: Shift assessment completed at this time patient resting in bed watching tv. Showing no signs of distress, call bell in reach and bed locked in lowest position.

## 2021-03-11 NOTE — DISCHARGE SUMMARY
[facility] SUMMARY    Name:  Mitch Freitas  MR#:   [id number]  :  1955  ACCOUNT #:  [de-identified]  ADMIT DATE:  2021  DISCHARGE DATE:  2021      DISCHARGE DIAGNOSES:  1. Cardiac arrhythmia. 2.  Nausea and vomiting. 3.  History of gallstones. 4.  Hypertension. CONSULTANTS:  Spencer Elena MD, Cardiology. PROCEDURES PERFORMED:  Abdominal ultrasound and HIDA scan. HOSPITAL SUMMARY:  This 24-year-old  female came in with complaints of nausea, vomiting, elevated blood pressure. She had no chest pain, shortness of breath, fevers, or significant abdominal pain. She was admitted to telemetry, blood pressure medications were adjusted. There is an abnormal EKG, but her troponins were negative. She had a normal EF on her echocardiogram.  Her electrolytes were repleted. She was seen in consultation by Cardiology, who recommended continuing her beta blocker, avoiding caffeine. She had a narrow complex tachycardia and a paroxysmal junctional reciprocating tachycardia that could not be ruled out, but her echo was normal, her cardiac enzymes were negative, and she was asymptomatic after admission. We did an ultrasound due to her history of having gallstones removed prior and that came back with findings equivocal for cholecystitis, although her abdominal exam was benign. We did a HIDA and it showed the filling of the gallbladder and cystic duct to be patent, and after morphine augmentation, she has had no further nausea, vomiting, or abdominal pain. Her daughter is in the room today translating for her and I discussed the fact that she most likely needs to go ahead and get her gallbladder removed as it may cause more issues for her, and her daughter is in agreement. They had planned for that. So she is going to talk to Dr. Janet Ardon about getting a surgical referral for an outpatient elective cholecystectomy.   Otherwise today, the patient is up and ambulatory in the room.  She has no issues.  No abdominal pain, chest pain, shortness of breath.  There are no arrhythmias noted.  Her white count within normal limits.  Her H and H are 9.4 and 29.3.  Her metabolic panel is stable.  Three sets of cardiac markers were normal on admission as were her LFTs and lipase.  She can discharge home today.  She has a normal exam.  She is happy.  She has eaten breakfast.  No cardiopulmonary abnormalities on exam.  She should discharge on the following medications which she takes at home any ways:  Lipitor 40 mg at night, Hyzaar 100/25 one a day, Aldactone 25 mg daily, hydralazine 25 mg three times daily, lisinopril 20 mg daily, atenolol 100 mg twice daily, and Norvasc 10 mg daily.  Follow up with Dr. Gunetr within the week and Dr. Stuart in 2 weeks.  40 minutes on discharge time.  Cardiac diet.  She is going home with daughter.      REGINO SLAUGHTER MD      [id number]  D:  03/11/2021 11:52  T:  03/11/2021 12:26  JOB #:  [id number]  CC:  Jerica Gunter MD

## 2021-03-11 NOTE — PROGRESS NOTES
Problem: Falls - Risk of  Goal: *Absence of Falls  Description: Document Starleen Freeze Fall Risk and appropriate interventions in the flowsheet.   Outcome: Resolved/Met  Note: Fall Risk Interventions:            Medication Interventions: Evaluate medications/consider consulting pharmacy

## 2021-04-30 ENCOUNTER — APPOINTMENT (OUTPATIENT)
Dept: GENERAL RADIOLOGY | Age: 66
End: 2021-04-30
Attending: EMERGENCY MEDICINE
Payer: MEDICARE

## 2021-04-30 ENCOUNTER — HOSPITAL ENCOUNTER (EMERGENCY)
Age: 66
Discharge: HOME | End: 2021-05-01
Attending: EMERGENCY MEDICINE
Payer: MEDICARE

## 2021-04-30 DIAGNOSIS — R74.8 ELEVATED LIPASE: ICD-10-CM

## 2021-04-30 DIAGNOSIS — I47.1 SVT (SUPRAVENTRICULAR TACHYCARDIA) (HCC): Primary | ICD-10-CM

## 2021-04-30 DIAGNOSIS — I10 ESSENTIAL HYPERTENSION: ICD-10-CM

## 2021-04-30 LAB
ALBUMIN SERPL-MCNC: 4.2 G/DL (ref 3.4–5)
ALBUMIN/GLOB SERPL: 1.1 {RATIO} (ref 0.8–1.7)
ALP SERPL-CCNC: 88 U/L (ref 45–117)
ALT SERPL-CCNC: 18 U/L (ref 13–56)
ANION GAP SERPL CALC-SCNC: 7 MMOL/L (ref 3–18)
AST SERPL-CCNC: 24 U/L (ref 10–38)
BASOPHILS # BLD: 0.1 K/UL (ref 0–0.1)
BASOPHILS NFR BLD: 0 % (ref 0–2)
BILIRUB SERPL-MCNC: 0.3 MG/DL (ref 0.2–1)
BNP SERPL-MCNC: 434 PG/ML (ref 0–900)
BUN SERPL-MCNC: 30 MG/DL (ref 7–18)
BUN/CREAT SERPL: 19 (ref 12–20)
CALCIUM SERPL-MCNC: 9.8 MG/DL (ref 8.5–10.1)
CHLORIDE SERPL-SCNC: 108 MMOL/L (ref 100–111)
CK MB CFR SERPL CALC: 0.8 % (ref 0–4)
CK MB SERPL-MCNC: 1.2 NG/ML (ref 5–25)
CK SERPL-CCNC: 156 U/L (ref 26–192)
CO2 SERPL-SCNC: 23 MMOL/L (ref 21–32)
CREAT SERPL-MCNC: 1.58 MG/DL (ref 0.6–1.3)
D DIMER PPP FEU-MCNC: 0.43 UG/ML(FEU)
DIFFERENTIAL METHOD BLD: ABNORMAL
EOSINOPHIL # BLD: 0.1 K/UL (ref 0–0.4)
EOSINOPHIL NFR BLD: 1 % (ref 0–5)
ERYTHROCYTE [DISTWIDTH] IN BLOOD BY AUTOMATED COUNT: 13.1 % (ref 11.6–14.5)
GLOBULIN SER CALC-MCNC: 3.9 G/DL (ref 2–4)
GLUCOSE SERPL-MCNC: 134 MG/DL (ref 74–99)
HCT VFR BLD AUTO: 35.4 % (ref 35–45)
HGB BLD-MCNC: 11.6 G/DL (ref 12–16)
INR PPP: 1 (ref 0.8–1.2)
LIPASE SERPL-CCNC: 588 U/L (ref 73–393)
LYMPHOCYTES # BLD: 2.3 K/UL (ref 0.9–3.6)
LYMPHOCYTES NFR BLD: 15 % (ref 21–52)
MAGNESIUM SERPL-MCNC: 1.9 MG/DL (ref 1.6–2.6)
MCH RBC QN AUTO: 28.2 PG (ref 24–34)
MCHC RBC AUTO-ENTMCNC: 32.8 G/DL (ref 31–37)
MCV RBC AUTO: 85.9 FL (ref 74–97)
MONOCYTES # BLD: 1 K/UL (ref 0.05–1.2)
MONOCYTES NFR BLD: 7 % (ref 3–10)
NEUTS SEG # BLD: 11.2 K/UL (ref 1.8–8)
NEUTS SEG NFR BLD: 77 % (ref 40–73)
PLATELET # BLD AUTO: 378 K/UL (ref 135–420)
PMV BLD AUTO: 10.1 FL (ref 9.2–11.8)
POTASSIUM SERPL-SCNC: 3.9 MMOL/L (ref 3.5–5.5)
PROT SERPL-MCNC: 8.1 G/DL (ref 6.4–8.2)
PROTHROMBIN TIME: 13.1 SEC (ref 11.5–15.2)
RBC # BLD AUTO: 4.12 M/UL (ref 4.2–5.3)
SODIUM SERPL-SCNC: 138 MMOL/L (ref 136–145)
TROPONIN I SERPL-MCNC: <0.02 NG/ML (ref 0–0.04)
WBC # BLD AUTO: 14.6 K/UL (ref 4.6–13.2)

## 2021-04-30 PROCEDURE — 83880 ASSAY OF NATRIURETIC PEPTIDE: CPT

## 2021-04-30 PROCEDURE — 83735 ASSAY OF MAGNESIUM: CPT

## 2021-04-30 PROCEDURE — 85025 COMPLETE CBC W/AUTO DIFF WBC: CPT

## 2021-04-30 PROCEDURE — 71045 X-RAY EXAM CHEST 1 VIEW: CPT

## 2021-04-30 PROCEDURE — 82553 CREATINE MB FRACTION: CPT

## 2021-04-30 PROCEDURE — 74011250637 HC RX REV CODE- 250/637: Performed by: EMERGENCY MEDICINE

## 2021-04-30 PROCEDURE — 85379 FIBRIN DEGRADATION QUANT: CPT

## 2021-04-30 PROCEDURE — 85610 PROTHROMBIN TIME: CPT

## 2021-04-30 PROCEDURE — 96375 TX/PRO/DX INJ NEW DRUG ADDON: CPT

## 2021-04-30 PROCEDURE — 74011250636 HC RX REV CODE- 250/636: Performed by: EMERGENCY MEDICINE

## 2021-04-30 PROCEDURE — 99285 EMERGENCY DEPT VISIT HI MDM: CPT

## 2021-04-30 PROCEDURE — 80053 COMPREHEN METABOLIC PANEL: CPT

## 2021-04-30 PROCEDURE — 93005 ELECTROCARDIOGRAM TRACING: CPT

## 2021-04-30 PROCEDURE — 83690 ASSAY OF LIPASE: CPT

## 2021-04-30 PROCEDURE — 96374 THER/PROPH/DIAG INJ IV PUSH: CPT

## 2021-04-30 RX ORDER — FAMOTIDINE 10 MG/ML
20 INJECTION INTRAVENOUS
Status: COMPLETED | OUTPATIENT
Start: 2021-04-30 | End: 2021-04-30

## 2021-04-30 RX ORDER — ADENOSINE 3 MG/ML
6 INJECTION, SOLUTION INTRAVENOUS
Status: COMPLETED | OUTPATIENT
Start: 2021-04-30 | End: 2021-04-30

## 2021-04-30 RX ORDER — AMLODIPINE BESYLATE 5 MG/1
10 TABLET ORAL
Status: COMPLETED | OUTPATIENT
Start: 2021-04-30 | End: 2021-04-30

## 2021-04-30 RX ADMIN — ADENOSINE 6 MG: 3 INJECTION INTRAVENOUS at 23:02

## 2021-04-30 RX ADMIN — AMLODIPINE BESYLATE 10 MG: 5 TABLET ORAL at 23:16

## 2021-04-30 RX ADMIN — FAMOTIDINE 20 MG: 10 INJECTION, SOLUTION INTRAVENOUS at 23:07

## 2021-05-01 VITALS
OXYGEN SATURATION: 99 % | HEIGHT: 59 IN | SYSTOLIC BLOOD PRESSURE: 162 MMHG | BODY MASS INDEX: 28.83 KG/M2 | DIASTOLIC BLOOD PRESSURE: 66 MMHG | HEART RATE: 81 BPM | WEIGHT: 143 LBS | TEMPERATURE: 97.3 F | RESPIRATION RATE: 20 BRPM

## 2021-05-01 LAB
ATRIAL RATE: 131 BPM
ATRIAL RATE: 133 BPM
ATRIAL RATE: 86 BPM
CALCULATED P AXIS, ECG09: 67 DEGREES
CALCULATED R AXIS, ECG10: 74 DEGREES
CALCULATED R AXIS, ECG10: 83 DEGREES
CALCULATED R AXIS, ECG10: 88 DEGREES
CALCULATED T AXIS, ECG11: -14 DEGREES
CALCULATED T AXIS, ECG11: -37 DEGREES
CALCULATED T AXIS, ECG11: 39 DEGREES
CK MB CFR SERPL CALC: 0.9 % (ref 0–4)
CK MB SERPL-MCNC: 1.3 NG/ML (ref 5–25)
CK SERPL-CCNC: 145 U/L (ref 26–192)
DIAGNOSIS, 93000: NORMAL
P-R INTERVAL, ECG05: 166 MS
Q-T INTERVAL, ECG07: 298 MS
Q-T INTERVAL, ECG07: 302 MS
Q-T INTERVAL, ECG07: 370 MS
QRS DURATION, ECG06: 74 MS
QRS DURATION, ECG06: 90 MS
QRS DURATION, ECG06: 96 MS
QTC CALCULATION (BEZET), ECG08: 442 MS
QTC CALCULATION (BEZET), ECG08: 449 MS
QTC CALCULATION (BEZET), ECG08: 449 MS
TROPONIN I SERPL-MCNC: <0.02 NG/ML (ref 0–0.04)
VENTRICULAR RATE, ECG03: 133 BPM
VENTRICULAR RATE, ECG03: 137 BPM
VENTRICULAR RATE, ECG03: 86 BPM

## 2021-05-01 PROCEDURE — 82553 CREATINE MB FRACTION: CPT

## 2021-05-01 PROCEDURE — 93005 ELECTROCARDIOGRAM TRACING: CPT

## 2021-05-01 NOTE — ED NOTES
I have reviewed discharge instructions with the patient. The patient verbalized understanding.     Patient armband removed and shredded'

## 2021-05-01 NOTE — ED PROVIDER NOTES
EMERGENCY DEPARTMENT HISTORY AND PHYSICAL EXAM    Date: 4/30/2021  Patient Name: Favian Bernal    History of Presenting Illness     Chief Complaint   Patient presents with    Hypertension         History Provided By: Patient, patient's daughter    Additional History (Context):   Favian Bernal is a 77 y.o. female with PMHX SVT, hypertension, hyperlipidemia, diabetes presents to the emergency department via private vehicle with her daughter C/O palpitations and substernal epigastric \"gassy\" pain that started earlier this afternoon. Patient's daughter reports that the patient is supposed to be on amlodipine however has not started it as they are waiting for the prescription from their pharmacy. Recently admitted for hypertension and is scheduled for a follow-up appointment with Dr. Jesus Magallon, her cardiologist in 5 days. History is translated by the patient's daughter as the patient is predominantly Macanese-speaking. Pt denies shortness of breath, nausea, vomiting, diarrhea, fever, cough, and any other sxs or complaints. No relieving or exacerbating factors identified. Denies alcohol, drug and tobacco use. PCP: Mili Magana MD    Current Outpatient Medications   Medication Sig Dispense Refill    atorvastatin (LIPITOR) 40 mg tablet Take 40 mg by mouth daily.  losartan-hydroCHLOROthiazide (HYZAAR) 100-25 mg per tablet Take 1 Tab by mouth daily.  hydrALAZINE (APRESOLINE) 25 mg tablet Take 1 Tab by mouth three (3) times daily. (Patient taking differently: Take 100 mg by mouth three (3) times daily.) 90 Tab 0    atenolol (TENORMIN) 100 mg tablet Take 1 Tab by mouth two (2) times a day. 30 Tab 0    spironolactone (ALDACTONE) 25 mg tablet Take 25 mg by mouth daily.  lisinopril (PRINIVIL, ZESTRIL) 20 mg tablet Take 1 Tab by mouth daily. 30 Tab 0    amLODIPine (NORVASC) 10 mg tablet Take 1 Tab by mouth daily.  30 Tab 0     Facility-Administered Medications Ordered in Other Encounters Medication Dose Route Frequency Provider Last Rate Last Admin    esmolol (BREVIBLOC) 100 mg/10 mL (10 mg/mL) injection   IntraVENous PRN [place] Maizes V, CRNA   10 mg at 09/19/18 1516    fentaNYL citrate (PF) injection   IntraVENous PRN [place] Maizes V, CRNA   25 mcg at 09/19/18 1518    midazolam (VERSED) injection   IntraVENous PRN James Maizes V, CRNA   1 mg at 09/19/18 1508    lidocaine (PF) (XYLOCAINE) 20 mg/mL (2 %) injection   IntraVENous PRN [place] Maizes V, CRNA   40 mg at 09/19/18 1528    propofol (DIPRIVAN) 10 mg/mL injection   IntraVENous PRN [place] Maizes V, CRNA   20 mg at 09/19/18 1623    rocuronium (ZEMURON) injection   IntraVENous PRN [place] Maizes V, CRNA   10 mg at 09/19/18 1552    PHENYLephrine (KELSEY-SYNEPHRINE) 10,000 mcg in 0.9% sodium chloride 100 mL infusion  10,000 mcg IntraVENous CONTINUOUS Tomas Randy, [place] V, CRNA   200 mcg at 09/19/18 1546    glucagon (GLUCAGEN) injection   IntraVENous PRN Rj Luu MD   1 mg at 09/19/18 1634    ondansetron (ZOFRAN) injection    PRN Rj Luu MD   4 mg at 09/19/18 1615    lactated Ringers infusion   IntraVENous CONTINUOUS Vijay Raymond, DO   New Bag at 09/19/18 1636    glycopyrrolate (ROBINUL) injection   IntraVENous PRN Vijay Andrade, DO   0.4 mg at 09/19/18 1641    neostigmine methylsulfate (PROSTIGMINE) 1 mg/mL syringe   IntraVENous PRN Vijay Garcíay, DO   2 mg at 09/19/18 1641    ketorolac (TORADOL) injection   IntraVENous PRN Vijay Raymond, DO   15 mg at 09/19/18 1645       Past History     Past Medical History:  Past Medical History:   Diagnosis Date    Hypertension        Past Surgical History:  No past surgical history on file. Family History:  No family history on file.     Social History:  Social History     Tobacco Use    Smoking status: Never Smoker    Smokeless tobacco: Never Used   Substance Use Topics    Alcohol use: No    Drug use: Never       Allergies:  No Known Allergies      Review of Systems   Review of Systems   Constitutional: Negative for chills and fever. HENT: Negative for congestion, ear pain, sinus pain and sore throat. Eyes: Negative for pain and visual disturbance. Respiratory: Negative for cough and shortness of breath. Cardiovascular: Positive for palpitations. Negative for chest pain and leg swelling. Gastrointestinal: Positive for abdominal pain. Negative for constipation, diarrhea, nausea and vomiting. Genitourinary: Negative for dysuria, hematuria, vaginal bleeding and vaginal discharge. Musculoskeletal: Negative for back pain and neck pain. Skin: Negative for rash and wound. Neurological: Negative for dizziness, tremors, weakness, light-headedness and numbness. All other systems reviewed and are negative. Physical Exam     Vitals:    04/30/21 2316 04/30/21 2330 05/01/21 0015 05/01/21 0030   BP: (!) 175/82 (!) 182/71 (!) 159/70 (!) 162/66   Pulse: 90 89 81 81   Resp:  19 16 20   Temp:       SpO2:  99% 99% 99%   Weight:       Height:         Physical Exam    Nursing note and vitals reviewed    Constitutional: Elderly  female, no acute distress, appears stated age  Head: Normocephalic, Atraumatic  Eyes: Pupils are equal, round, and reactive to light, EOMI, noninjected conjunctiva  Neck: Supple, non-tender, trachea midline, no JVD  Cardiovascular: Tachycardic, no murmurs, rubs, or gallops, + 2 radial pulses bilaterally  Chest: Normal work of breathing and symmetrical chest excursion bilaterally  Lungs: Clear to ausculation bilaterally, no wheezes, no rhonchi  Abdomen: Soft, non tender, non distended, normoactive bowel sounds  Back: No evidence of trauma or deformity  Extremities: No evidence of trauma or deformity, no LE edema.  No streaking erythema, vesicular lesions, ulcerations or bulla  Skin: Warm and dry, normal cap refill  Neuro: Alert and appropriate, CN intact, normal speech, moving all 4 extremities freely and symmetrically  Psychiatric: Normal mood and affect       Diagnostic Study Results     Labs -     Recent Results (from the past 12 hour(s))   EKG, 12 LEAD, INITIAL    Collection Time: 04/30/21 10:43 PM   Result Value Ref Range    Ventricular Rate 137 BPM    Atrial Rate 131 BPM    QRS Duration 74 ms    Q-T Interval 298 ms    QTC Calculation (Bezet) 449 ms    Calculated R Axis 88 degrees    Calculated T Axis -37 degrees    Diagnosis       Supraventricular tachycardia  Septal infarct , age undetermined  ST & T wave abnormality, consider inferior ischemia  Abnormal ECG  When compared with ECG of 09-MAR-2021 01:48,  T wave inversion now evident in Inferior leads     CBC WITH AUTOMATED DIFF    Collection Time: 04/30/21 10:50 PM   Result Value Ref Range    WBC 14.6 (H) 4.6 - 13.2 K/uL    RBC 4.12 (L) 4.20 - 5.30 M/uL    HGB 11.6 (L) 12.0 - 16.0 g/dL    HCT 35.4 35.0 - 45.0 %    MCV 85.9 74.0 - 97.0 FL    MCH 28.2 24.0 - 34.0 PG    MCHC 32.8 31.0 - 37.0 g/dL    RDW 13.1 11.6 - 14.5 %    PLATELET 489 966 - 068 K/uL    MPV 10.1 9.2 - 11.8 FL    NEUTROPHILS 77 (H) 40 - 73 %    LYMPHOCYTES 15 (L) 21 - 52 %    MONOCYTES 7 3 - 10 %    EOSINOPHILS 1 0 - 5 %    BASOPHILS 0 0 - 2 %    ABS. NEUTROPHILS 11.2 (H) 1.8 - 8.0 K/UL    ABS. LYMPHOCYTES 2.3 0.9 - 3.6 K/UL    ABS. MONOCYTES 1.0 0.05 - 1.2 K/UL    ABS. EOSINOPHILS 0.1 0.0 - 0.4 K/UL    ABS.  BASOPHILS 0.1 0.0 - 0.1 K/UL    DF AUTOMATED     METABOLIC PANEL, COMPREHENSIVE    Collection Time: 04/30/21 10:50 PM   Result Value Ref Range    Sodium 138 136 - 145 mmol/L    Potassium 3.9 3.5 - 5.5 mmol/L    Chloride 108 100 - 111 mmol/L    CO2 23 21 - 32 mmol/L    Anion gap 7 3.0 - 18 mmol/L    Glucose 134 (H) 74 - 99 mg/dL    BUN 30 (H) 7.0 - 18 MG/DL    Creatinine 1.58 (H) 0.6 - 1.3 MG/DL    BUN/Creatinine ratio 19 12 - 20      GFR est AA 40 (L) >60 ml/min/1.73m2    GFR est non-AA 33 (L) >60 ml/min/1.73m2    Calcium 9.8 8.5 - 10.1 MG/DL    Bilirubin, total 0.3 0.2 - 1.0 MG/DL    ALT (SGPT) 18 13 - 56 U/L    AST (SGOT) 24 10 - 38 U/L    Alk.  phosphatase 88 45 - 117 U/L    Protein, total 8.1 6.4 - 8.2 g/dL    Albumin 4.2 3.4 - 5.0 g/dL    Globulin 3.9 2.0 - 4.0 g/dL    A-G Ratio 1.1 0.8 - 1.7     NT-PRO BNP    Collection Time: 04/30/21 10:50 PM   Result Value Ref Range    NT pro- 0 - 900 PG/ML   PROTHROMBIN TIME + INR    Collection Time: 04/30/21 10:50 PM   Result Value Ref Range    Prothrombin time 13.1 11.5 - 15.2 sec    INR 1.0 0.8 - 1.2     CARDIAC PANEL,(CK, CKMB & TROPONIN)    Collection Time: 04/30/21 10:50 PM   Result Value Ref Range    CK - MB 1.2 <3.6 ng/ml    CK-MB Index 0.8 0.0 - 4.0 %     26 - 192 U/L    Troponin-I, QT <0.02 0.0 - 0.045 NG/ML   LIPASE    Collection Time: 04/30/21 10:50 PM   Result Value Ref Range    Lipase 588 (H) 73 - 393 U/L   MAGNESIUM    Collection Time: 04/30/21 10:50 PM   Result Value Ref Range    Magnesium 1.9 1.6 - 2.6 mg/dL   D DIMER    Collection Time: 04/30/21 10:50 PM   Result Value Ref Range    D DIMER 0.43 <0.46 ug/ml(FEU)   EKG, 12 LEAD, INITIAL    Collection Time: 04/30/21 10:57 PM   Result Value Ref Range    Ventricular Rate 133 BPM    Atrial Rate 133 BPM    QRS Duration 96 ms    Q-T Interval 302 ms    QTC Calculation (Bezet) 449 ms    Calculated R Axis 83 degrees    Calculated T Axis -14 degrees    Diagnosis       Supraventricular tachycardia  Incomplete right bundle branch block  Septal infarct (cited on or before 30-APR-2021)  Abnormal ECG  When compared with ECG of 30-APR-2021 22:43,  No significant change was found     EKG, 12 LEAD, SUBSEQUENT    Collection Time: 05/01/21 12:27 AM   Result Value Ref Range    Ventricular Rate 86 BPM    Atrial Rate 86 BPM    P-R Interval 166 ms    QRS Duration 90 ms    Q-T Interval 370 ms    QTC Calculation (Bezet) 442 ms    Calculated P Axis 67 degrees    Calculated R Axis 74 degrees    Calculated T Axis 39 degrees    Diagnosis       Normal sinus rhythm  Normal ECG  When compared with ECG of 30-APR-2021 23:02,  ST no longer depressed in Anterior leads     CARDIAC PANEL,(CK, CKMB & TROPONIN)    Collection Time: 05/01/21 12:30 AM   Result Value Ref Range    CK - MB 1.3 <3.6 ng/ml    CK-MB Index 0.9 0.0 - 4.0 %     26 - 192 U/L    Troponin-I, QT <0.02 0.0 - 0.045 NG/ML       Radiologic Studies -   XR CHEST PORT   Final Result      Borderline cardiomegaly with coarse interstitial markings bilaterally. No new   focal consolidation. CT Results  (Last 48 hours)    None        CXR Results  (Last 48 hours)               04/30/21 2305  XR CHEST PORT Final result    Impression:      Borderline cardiomegaly with coarse interstitial markings bilaterally. No new   focal consolidation. Narrative:  EXAM: CHEST RADIOGRAPH       CLINICAL INDICATION/HISTORY: CP     > Additional: Chest pain       COMPARISON: 3/8/2021       TECHNIQUE: Portable frontal view of the chest       _______________       FINDINGS:       SUPPORT DEVICES: None. HEART AND MEDIASTINUM: Heart is borderline enlarged. Atherosclerotic   calcifications in the aorta. LUNGS AND PLEURAL SPACES: Coarse interstitial markings bilaterally. No focal   consolidation, effusion, or pneumothorax. BONES AND SOFT TISSUES: Unremarkable.       _______________                   Medical Decision Making   I am the first provider for this patient. I reviewed the vital signs, available nursing notes, past medical history, past surgical history, family history and social history. Vital Signs-Reviewed the patient's vital signs. Pulse Oximetry Analysis -100% on room air    Cardiac Monitor:  Rate: 90 bpm  Rhythm: Regular    EKG interpretation: (Preliminary)  10:42 PM   SVT at 133 bpm.   ms. No acute ST elevation    EKG interpretation: (Preliminary)  11:48 PM   Normal sinus rhythm at 100 bpm.  CT interval 166 ms. QRS 82 ms. QTc 425 ms.   No acute ST elevation    EKG interpretation: (Preliminary)  12:39 AM   Normal sinus rhythm at 86 bpm.  W[place] interval 166 ms. QRS 90 ms. QTc 442 ms. No acute ST elevation      Records Reviewed: Nursing Notes and Old Medical Records    Provider Notes:   77 y.o. female with a history of SVT, hypertension, hyperlipidemia presenting with palpitations and substernal pain. On presentation, patient was tachycardic with heart rates in the 130s and SVT. However hemodynamic stable. Patient given adenosine with better rate control. Repeat EKG showing sinus rhythm. Will eval for any electrolyte derangement that may have triggered her SVT. Will ensure that there is no signs of any cardiac strain and check cardiac enzymes. As the patient is not anticoagulated, will rule out coagulopathy and check D-dimer. If patient continues to be rate controlled, will repeat EKG and troponin to rule out ACS. Procedures:  Procedures    ED Course:   10:42 PM   Initial assessment performed. The patients presenting problems have been discussed, and they are in agreement with the care plan formulated and outlined with them. I have encouraged them to ask questions as they arise throughout their visit. 1:05 AM  Patient has had 2 - delta troponins. Has had no episodes of further SVTs since being rate controlled with adenosine. D-dimer is within normal limits. Lipase slightly elevated at 588 however not meeting criteria for acute pancreatitis. However her gassy symptom station may be secondary to some inflammation of her pancreas. Discussed the lab work and findings with patient's daughter. Urged close follow-up with Dr. Villa Comes with her cardiologist and diet-controlled for her elevated lipase. Patient's daughter report  during her admission, noted to have signs of cholelithiasis and inflammation to her gallbladder. This may explain her slight elevation in her lipase. However LFTs are within normal limits. Scheduled for outpatient cholecystectomy with Dr. Ngoc Fields.   The patient otherwise has no right upper quadrant abdominal pain, afebrile, there is no indication for emergent surgical intervention. Diagnosis and Disposition       DISCHARGE NOTE:  1:05 AM    Sabino Soto's  results have been reviewed with her. She has been counseled regarding her diagnosis, treatment, and plan. She verbally conveys understanding and agreement of the signs, symptoms, diagnosis, treatment and prognosis and additionally agrees to follow up as discussed. She also agrees with the care-plan and conveys that all of her questions have been answered. I have also provided discharge instructions for her that include: educational information regarding their diagnosis and treatment, and list of reasons why they would want to return to the ED prior to their follow-up appointment, should her condition change. She has been provided with education for proper emergency department utilization. CLINICAL IMPRESSION:    1. SVT (supraventricular tachycardia) ([address]    2. Essential hypertension        PLAN:  1. D/C Home  2. Current Discharge Medication List        3. Follow-up Information     Follow up With Specialties Details Why Contact Info    Joe Stafford MD Family Medicine Schedule an appointment as soon as possible for a visit in 2 days  [address]  [address] [place] [address]      Neto Villarreal MD Cardiology Go to   [address] [place] [address] [address] [address]  [phone]      [facility] EMERGENCY DEPT Emergency Medicine  As needed if symptoms worsen [address] [address]  [phone]        ____________________________________     Please note that this dictation was completed with [employer], the computer voice recognition software. Quite often unanticipated grammatical, syntax, homophones, and other interpretive errors are inadvertently transcribed by the computer software. Please disregard these errors. Please excuse any errors that have escaped final proofreading.

## 2021-05-01 NOTE — ED TRIAGE NOTES
Patient reports htn with a systolic bp in the 946N. C/o \"gassiness to epigastric area. \" Patient started on amlodipine this Wednesday by pcp but hasn't started yet due to not being available at pharmacy

## 2021-08-03 PROBLEM — I10 HYPERTENSION: Status: RESOLVED | Noted: 2021-08-03 | Resolved: 2021-08-03

## 2021-08-13 ENCOUNTER — HOSPITAL ENCOUNTER (OUTPATIENT)
Dept: PREADMISSION TESTING | Age: 66
Discharge: HOME | End: 2021-08-13
Payer: MEDICARE

## 2021-08-13 LAB
ANION GAP SERPL CALC-SCNC: 3 MMOL/L (ref 3–18)
BASOPHILS # BLD: 0.1 K/UL (ref 0–0.1)
BASOPHILS NFR BLD: 1 % (ref 0–2)
BUN SERPL-MCNC: 27 MG/DL (ref 7–18)
BUN/CREAT SERPL: 17 (ref 12–20)
CALCIUM SERPL-MCNC: 9 MG/DL (ref 8.5–10.1)
CHLORIDE SERPL-SCNC: 110 MMOL/L (ref 100–111)
CO2 SERPL-SCNC: 27 MMOL/L (ref 21–32)
CREAT SERPL-MCNC: 1.56 MG/DL (ref 0.6–1.3)
DIFFERENTIAL METHOD BLD: ABNORMAL
EOSINOPHIL # BLD: 0.2 K/UL (ref 0–0.4)
EOSINOPHIL NFR BLD: 2 % (ref 0–5)
ERYTHROCYTE [DISTWIDTH] IN BLOOD BY AUTOMATED COUNT: 13.1 % (ref 11.6–14.5)
GLUCOSE SERPL-MCNC: 124 MG/DL (ref 74–99)
HCT VFR BLD AUTO: 30.5 % (ref 35–45)
HGB BLD-MCNC: 9.6 G/DL (ref 12–16)
LYMPHOCYTES # BLD: 2 K/UL (ref 0.9–3.6)
LYMPHOCYTES NFR BLD: 20 % (ref 21–52)
MCH RBC QN AUTO: 27.5 PG (ref 24–34)
MCHC RBC AUTO-ENTMCNC: 31.5 G/DL (ref 31–37)
MCV RBC AUTO: 87.4 FL (ref 74–97)
MONOCYTES # BLD: 0.7 K/UL (ref 0.05–1.2)
MONOCYTES NFR BLD: 8 % (ref 3–10)
NEUTS SEG # BLD: 6.8 K/UL (ref 1.8–8)
NEUTS SEG NFR BLD: 69 % (ref 40–73)
PLATELET # BLD AUTO: 368 K/UL (ref 135–420)
PMV BLD AUTO: 9.4 FL (ref 9.2–11.8)
POTASSIUM SERPL-SCNC: 4.7 MMOL/L (ref 3.5–5.5)
RBC # BLD AUTO: 3.49 M/UL (ref 4.2–5.3)
SODIUM SERPL-SCNC: 140 MMOL/L (ref 136–145)
WBC # BLD AUTO: 9.9 K/UL (ref 4.6–13.2)

## 2021-08-13 PROCEDURE — 85025 COMPLETE CBC W/AUTO DIFF WBC: CPT

## 2021-08-13 PROCEDURE — 93005 ELECTROCARDIOGRAM TRACING: CPT

## 2021-08-13 PROCEDURE — 36415 COLL VENOUS BLD VENIPUNCTURE: CPT

## 2021-08-13 PROCEDURE — 80048 BASIC METABOLIC PNL TOTAL CA: CPT

## 2021-08-14 LAB
ATRIAL RATE: 68 BPM
CALCULATED P AXIS, ECG09: 38 DEGREES
CALCULATED R AXIS, ECG10: 101 DEGREES
CALCULATED T AXIS, ECG11: 51 DEGREES
DIAGNOSIS, 93000: NORMAL
P-R INTERVAL, ECG05: 150 MS
Q-T INTERVAL, ECG07: 412 MS
QRS DURATION, ECG06: 74 MS
QTC CALCULATION (BEZET), ECG08: 438 MS
VENTRICULAR RATE, ECG03: 68 BPM

## 2021-08-23 ENCOUNTER — HOSPITAL ENCOUNTER (OUTPATIENT)
Dept: PREADMISSION TESTING | Age: 66
Discharge: HOME | End: 2021-08-23
Payer: MEDICARE

## 2021-08-23 PROCEDURE — U0003 INFECTIOUS AGENT DETECTION BY NUCLEIC ACID (DNA OR RNA); SEVERE ACUTE RESPIRATORY SYNDROME CORONAVIRUS 2 (SARS-COV-2) (CORONAVIRUS DISEASE [COVID-19]), AMPLIFIED PROBE TECHNIQUE, MAKING USE OF HIGH THROUGHPUT TECHNOLOGIES AS DESCRIBED BY CMS-2020-01-R: HCPCS

## 2021-08-24 LAB — SARS-COV-2, COV2NT: NOT DETECTED

## 2021-08-26 ENCOUNTER — HOSPITAL ENCOUNTER (OUTPATIENT)
Age: 66
Setting detail: OUTPATIENT SURGERY
Discharge: HOME | End: 2021-08-26
Attending: SURGERY | Admitting: SURGERY
Payer: MEDICARE

## 2021-08-26 ENCOUNTER — ANESTHESIA (OUTPATIENT)
Dept: SURGERY | Age: 66
End: 2021-08-26
Payer: MEDICARE

## 2021-08-26 ENCOUNTER — ANESTHESIA EVENT (OUTPATIENT)
Dept: SURGERY | Age: 66
End: 2021-08-26
Payer: MEDICARE

## 2021-08-26 VITALS
HEART RATE: 67 BPM | HEIGHT: 58 IN | RESPIRATION RATE: 16 BRPM | BODY MASS INDEX: 28.97 KG/M2 | OXYGEN SATURATION: 100 % | SYSTOLIC BLOOD PRESSURE: 124 MMHG | WEIGHT: 138 LBS | TEMPERATURE: 97.9 F | DIASTOLIC BLOOD PRESSURE: 56 MMHG

## 2021-08-26 DIAGNOSIS — Z90.49 S/P LAPAROSCOPIC CHOLECYSTECTOMY: Primary | ICD-10-CM

## 2021-08-26 PROCEDURE — 77030008683 HC TU ET CUF COVD -A: Performed by: ANESTHESIOLOGY

## 2021-08-26 PROCEDURE — 77030008516 HC TBNG INSUF ENDO S&N -B: Performed by: SURGERY

## 2021-08-26 PROCEDURE — 77030012770 HC TRCR OPT FX AMR -B: Performed by: SURGERY

## 2021-08-26 PROCEDURE — 74011000250 HC RX REV CODE- 250: Performed by: REGISTERED NURSE

## 2021-08-26 PROCEDURE — 77030020829: Performed by: SURGERY

## 2021-08-26 PROCEDURE — 77030006643: Performed by: ANESTHESIOLOGY

## 2021-08-26 PROCEDURE — 74011000250 HC RX REV CODE- 250: Performed by: SURGERY

## 2021-08-26 PROCEDURE — 76210000016 HC OR PH I REC 1 TO 1.5 HR: Performed by: SURGERY

## 2021-08-26 PROCEDURE — 77030009851 HC PCH RTVR ENDOSC AMR -B: Performed by: SURGERY

## 2021-08-26 PROCEDURE — 74011250636 HC RX REV CODE- 250/636: Performed by: SURGERY

## 2021-08-26 PROCEDURE — 77030009403 HC ELECTRD ENDO MEGA -B: Performed by: SURGERY

## 2021-08-26 PROCEDURE — 77030003578 HC NDL INSUF VERES AMR -B: Performed by: SURGERY

## 2021-08-26 PROCEDURE — 76210000022 HC REC RM PH II 1.5 TO 2 HR: Performed by: SURGERY

## 2021-08-26 PROCEDURE — 77030010030: Performed by: SURGERY

## 2021-08-26 PROCEDURE — 77030018875 HC APPL CLP LIG4 J&J -B: Performed by: SURGERY

## 2021-08-26 PROCEDURE — 77030008608 HC TRCR ENDOSC SMTH AMR -B: Performed by: SURGERY

## 2021-08-26 PROCEDURE — 2709999900 HC NON-CHARGEABLE SUPPLY: Performed by: SURGERY

## 2021-08-26 PROCEDURE — 77030031139 HC SUT VCRL2 J&J -A: Performed by: SURGERY

## 2021-08-26 PROCEDURE — 77030002933 HC SUT MCRYL J&J -A: Performed by: SURGERY

## 2021-08-26 PROCEDURE — 76010000149 HC OR TIME 1 TO 1.5 HR: Performed by: SURGERY

## 2021-08-26 PROCEDURE — 77030008574 HC TBNG SUC IRR STRY -B: Performed by: SURGERY

## 2021-08-26 PROCEDURE — 76060000033 HC ANESTHESIA 1 TO 1.5 HR: Performed by: SURGERY

## 2021-08-26 PROCEDURE — 88304 TISSUE EXAM BY PATHOLOGIST: CPT

## 2021-08-26 PROCEDURE — 74011250636 HC RX REV CODE- 250/636: Performed by: REGISTERED NURSE

## 2021-08-26 PROCEDURE — 74011000272 HC RX REV CODE- 272: Performed by: SURGERY

## 2021-08-26 PROCEDURE — 77030020782 HC GWN BAIR PAWS FLX 3M -B: Performed by: SURGERY

## 2021-08-26 PROCEDURE — 77030010507 HC ADH SKN DERMBND J&J -B: Performed by: SURGERY

## 2021-08-26 PROCEDURE — 77030008477 HC STYL SATN SLP COVD -A: Performed by: ANESTHESIOLOGY

## 2021-08-26 PROCEDURE — 74011250636 HC RX REV CODE- 250/636: Performed by: ANESTHESIOLOGY

## 2021-08-26 PROCEDURE — 77030013079 HC BLNKT BAIR HGGR 3M -A: Performed by: ANESTHESIOLOGY

## 2021-08-26 RX ORDER — GLYCOPYRROLATE 0.2 MG/ML
INJECTION INTRAMUSCULAR; INTRAVENOUS AS NEEDED
Status: DISCONTINUED | OUTPATIENT
Start: 2021-08-26 | End: 2021-08-26 | Stop reason: HOSPADM

## 2021-08-26 RX ORDER — EPHEDRINE SULFATE/0.9% NACL/PF 50 MG/5 ML
SYRINGE (ML) INTRAVENOUS AS NEEDED
Status: DISCONTINUED | OUTPATIENT
Start: 2021-08-26 | End: 2021-08-26 | Stop reason: HOSPADM

## 2021-08-26 RX ORDER — CEFAZOLIN SODIUM/WATER 2 G/20 ML
2 SYRINGE (ML) INTRAVENOUS ONCE
Status: COMPLETED | OUTPATIENT
Start: 2021-08-26 | End: 2021-08-26

## 2021-08-26 RX ORDER — INSULIN LISPRO 100 [IU]/ML
INJECTION, SOLUTION INTRAVENOUS; SUBCUTANEOUS ONCE
Status: DISCONTINUED | OUTPATIENT
Start: 2021-08-26 | End: 2021-08-26 | Stop reason: HOSPADM

## 2021-08-26 RX ORDER — BUPIVACAINE HYDROCHLORIDE AND EPINEPHRINE 2.5; 5 MG/ML; UG/ML
INJECTION, SOLUTION EPIDURAL; INFILTRATION; INTRACAUDAL; PERINEURAL AS NEEDED
Status: DISCONTINUED | OUTPATIENT
Start: 2021-08-26 | End: 2021-08-26 | Stop reason: HOSPADM

## 2021-08-26 RX ORDER — MAGNESIUM SULFATE 100 %
4 CRYSTALS MISCELLANEOUS AS NEEDED
Status: DISCONTINUED | OUTPATIENT
Start: 2021-08-26 | End: 2021-08-26 | Stop reason: HOSPADM

## 2021-08-26 RX ORDER — SODIUM CHLORIDE 0.9 % (FLUSH) 0.9 %
5-40 SYRINGE (ML) INJECTION AS NEEDED
Status: DISCONTINUED | OUTPATIENT
Start: 2021-08-26 | End: 2021-08-26 | Stop reason: HOSPADM

## 2021-08-26 RX ORDER — LIDOCAINE HYDROCHLORIDE 20 MG/ML
INJECTION, SOLUTION EPIDURAL; INFILTRATION; INTRACAUDAL; PERINEURAL AS NEEDED
Status: DISCONTINUED | OUTPATIENT
Start: 2021-08-26 | End: 2021-08-26 | Stop reason: HOSPADM

## 2021-08-26 RX ORDER — ONDANSETRON 2 MG/ML
INJECTION INTRAMUSCULAR; INTRAVENOUS AS NEEDED
Status: DISCONTINUED | OUTPATIENT
Start: 2021-08-26 | End: 2021-08-26 | Stop reason: HOSPADM

## 2021-08-26 RX ORDER — MIDAZOLAM HYDROCHLORIDE 1 MG/ML
INJECTION, SOLUTION INTRAMUSCULAR; INTRAVENOUS AS NEEDED
Status: DISCONTINUED | OUTPATIENT
Start: 2021-08-26 | End: 2021-08-26 | Stop reason: HOSPADM

## 2021-08-26 RX ORDER — ROCURONIUM BROMIDE 10 MG/ML
INJECTION, SOLUTION INTRAVENOUS AS NEEDED
Status: DISCONTINUED | OUTPATIENT
Start: 2021-08-26 | End: 2021-08-26 | Stop reason: HOSPADM

## 2021-08-26 RX ORDER — HYDROCODONE BITARTRATE AND ACETAMINOPHEN 5; 325 MG/1; MG/1
1 TABLET ORAL AS NEEDED
Status: DISCONTINUED | OUTPATIENT
Start: 2021-08-26 | End: 2021-08-26 | Stop reason: HOSPADM

## 2021-08-26 RX ORDER — SODIUM CHLORIDE, SODIUM LACTATE, POTASSIUM CHLORIDE, CALCIUM CHLORIDE 600; 310; 30; 20 MG/100ML; MG/100ML; MG/100ML; MG/100ML
125 INJECTION, SOLUTION INTRAVENOUS CONTINUOUS
Status: DISCONTINUED | OUTPATIENT
Start: 2021-08-26 | End: 2021-08-26 | Stop reason: HOSPADM

## 2021-08-26 RX ORDER — SODIUM CHLORIDE, SODIUM LACTATE, POTASSIUM CHLORIDE, CALCIUM CHLORIDE 600; 310; 30; 20 MG/100ML; MG/100ML; MG/100ML; MG/100ML
150 INJECTION, SOLUTION INTRAVENOUS CONTINUOUS
Status: DISCONTINUED | OUTPATIENT
Start: 2021-08-26 | End: 2021-08-26 | Stop reason: HOSPADM

## 2021-08-26 RX ORDER — HYDROMORPHONE HYDROCHLORIDE 1 MG/ML
0.2 INJECTION, SOLUTION INTRAMUSCULAR; INTRAVENOUS; SUBCUTANEOUS AS NEEDED
Status: DISCONTINUED | OUTPATIENT
Start: 2021-08-26 | End: 2021-08-26 | Stop reason: HOSPADM

## 2021-08-26 RX ORDER — OXYCODONE AND ACETAMINOPHEN 5; 325 MG/1; MG/1
1 TABLET ORAL
Qty: 18 TABLET | Refills: 0 | Status: SHIPPED | OUTPATIENT
Start: 2021-08-26 | End: 2021-08-29

## 2021-08-26 RX ORDER — FENTANYL CITRATE 50 UG/ML
25 INJECTION, SOLUTION INTRAMUSCULAR; INTRAVENOUS
Status: DISCONTINUED | OUTPATIENT
Start: 2021-08-26 | End: 2021-08-26 | Stop reason: HOSPADM

## 2021-08-26 RX ORDER — PROPOFOL 10 MG/ML
INJECTION, EMULSION INTRAVENOUS AS NEEDED
Status: DISCONTINUED | OUTPATIENT
Start: 2021-08-26 | End: 2021-08-26 | Stop reason: HOSPADM

## 2021-08-26 RX ORDER — DEXTROSE 50 % IN WATER (D50W) INTRAVENOUS SYRINGE
25-50 AS NEEDED
Status: DISCONTINUED | OUTPATIENT
Start: 2021-08-26 | End: 2021-08-26 | Stop reason: HOSPADM

## 2021-08-26 RX ORDER — ALBUTEROL SULFATE 0.83 MG/ML
2.5 SOLUTION RESPIRATORY (INHALATION) AS NEEDED
Status: DISCONTINUED | OUTPATIENT
Start: 2021-08-26 | End: 2021-08-26 | Stop reason: HOSPADM

## 2021-08-26 RX ORDER — FENTANYL CITRATE 50 UG/ML
INJECTION, SOLUTION INTRAMUSCULAR; INTRAVENOUS AS NEEDED
Status: DISCONTINUED | OUTPATIENT
Start: 2021-08-26 | End: 2021-08-26 | Stop reason: HOSPADM

## 2021-08-26 RX ORDER — KETAMINE HCL IN 0.9 % NACL 50 MG/5 ML
SYRINGE (ML) INTRAVENOUS AS NEEDED
Status: DISCONTINUED | OUTPATIENT
Start: 2021-08-26 | End: 2021-08-26 | Stop reason: HOSPADM

## 2021-08-26 RX ORDER — METOCLOPRAMIDE HYDROCHLORIDE 5 MG/ML
INJECTION INTRAMUSCULAR; INTRAVENOUS AS NEEDED
Status: DISCONTINUED | OUTPATIENT
Start: 2021-08-26 | End: 2021-08-26 | Stop reason: HOSPADM

## 2021-08-26 RX ORDER — NALOXONE HYDROCHLORIDE 0.4 MG/ML
0.1 INJECTION, SOLUTION INTRAMUSCULAR; INTRAVENOUS; SUBCUTANEOUS AS NEEDED
Status: DISCONTINUED | OUTPATIENT
Start: 2021-08-26 | End: 2021-08-26 | Stop reason: HOSPADM

## 2021-08-26 RX ORDER — SODIUM CHLORIDE 0.9 % (FLUSH) 0.9 %
5-40 SYRINGE (ML) INJECTION EVERY 8 HOURS
Status: DISCONTINUED | OUTPATIENT
Start: 2021-08-26 | End: 2021-08-26 | Stop reason: HOSPADM

## 2021-08-26 RX ORDER — ONDANSETRON 8 MG/1
8 TABLET, ORALLY DISINTEGRATING ORAL
Qty: 12 TABLET | Refills: 0 | Status: SHIPPED | OUTPATIENT
Start: 2021-08-26

## 2021-08-26 RX ORDER — DIPHENHYDRAMINE HYDROCHLORIDE 50 MG/ML
12.5 INJECTION, SOLUTION INTRAMUSCULAR; INTRAVENOUS
Status: DISCONTINUED | OUTPATIENT
Start: 2021-08-26 | End: 2021-08-26 | Stop reason: HOSPADM

## 2021-08-26 RX ORDER — DEXAMETHASONE SODIUM PHOSPHATE 4 MG/ML
INJECTION, SOLUTION INTRA-ARTICULAR; INTRALESIONAL; INTRAMUSCULAR; INTRAVENOUS; SOFT TISSUE AS NEEDED
Status: DISCONTINUED | OUTPATIENT
Start: 2021-08-26 | End: 2021-08-26 | Stop reason: HOSPADM

## 2021-08-26 RX ORDER — ONDANSETRON 2 MG/ML
4 INJECTION INTRAMUSCULAR; INTRAVENOUS ONCE
Status: COMPLETED | OUTPATIENT
Start: 2021-08-26 | End: 2021-08-26

## 2021-08-26 RX ADMIN — LIDOCAINE HYDROCHLORIDE 80 MG: 20 INJECTION, SOLUTION INTRAVENOUS at 11:50

## 2021-08-26 RX ADMIN — Medication 10 MG: at 12:03

## 2021-08-26 RX ADMIN — ONDANSETRON HYDROCHLORIDE 4 MG: 2 INJECTION INTRAMUSCULAR; INTRAVENOUS at 12:15

## 2021-08-26 RX ADMIN — GLYCOPYRROLATE 0.2 MG: 0.2 INJECTION INTRAMUSCULAR; INTRAVENOUS at 11:47

## 2021-08-26 RX ADMIN — ROCURONIUM BROMIDE 20 MG: 10 INJECTION, SOLUTION INTRAVENOUS at 12:16

## 2021-08-26 RX ADMIN — HYDROMORPHONE HYDROCHLORIDE 0.2 MG: 1 INJECTION, SOLUTION INTRAMUSCULAR; INTRAVENOUS; SUBCUTANEOUS at 14:03

## 2021-08-26 RX ADMIN — FENTANYL CITRATE 25 MCG: 50 INJECTION, SOLUTION INTRAMUSCULAR; INTRAVENOUS at 13:34

## 2021-08-26 RX ADMIN — SODIUM CHLORIDE, SODIUM LACTATE, POTASSIUM CHLORIDE, AND CALCIUM CHLORIDE 125 ML/HR: 600; 310; 30; 20 INJECTION, SOLUTION INTRAVENOUS at 10:58

## 2021-08-26 RX ADMIN — FENTANYL CITRATE 100 MCG: 50 INJECTION, SOLUTION INTRAMUSCULAR; INTRAVENOUS at 11:46

## 2021-08-26 RX ADMIN — FENTANYL CITRATE 25 MCG: 50 INJECTION, SOLUTION INTRAMUSCULAR; INTRAVENOUS at 13:44

## 2021-08-26 RX ADMIN — Medication 30 MG: at 11:51

## 2021-08-26 RX ADMIN — CEFAZOLIN 2 G: 1 INJECTION, POWDER, FOR SOLUTION INTRAVENOUS at 12:00

## 2021-08-26 RX ADMIN — Medication 20 MG: at 12:08

## 2021-08-26 RX ADMIN — MIDAZOLAM 2 MG: 1 INJECTION INTRAMUSCULAR; INTRAVENOUS at 11:45

## 2021-08-26 RX ADMIN — ROCURONIUM BROMIDE 30 MG: 10 INJECTION, SOLUTION INTRAVENOUS at 11:52

## 2021-08-26 RX ADMIN — DEXAMETHASONE SODIUM PHOSPHATE 4 MG: 4 INJECTION, SOLUTION INTRAMUSCULAR; INTRAVENOUS at 12:09

## 2021-08-26 RX ADMIN — SODIUM CHLORIDE, SODIUM LACTATE, POTASSIUM CHLORIDE, AND CALCIUM CHLORIDE: 600; 310; 30; 20 INJECTION, SOLUTION INTRAVENOUS at 12:15

## 2021-08-26 RX ADMIN — FENTANYL CITRATE 25 MCG: 50 INJECTION, SOLUTION INTRAMUSCULAR; INTRAVENOUS at 13:23

## 2021-08-26 RX ADMIN — SODIUM CHLORIDE, SODIUM LACTATE, POTASSIUM CHLORIDE, AND CALCIUM CHLORIDE 125 ML/HR: 600; 310; 30; 20 INJECTION, SOLUTION INTRAVENOUS at 07:40

## 2021-08-26 RX ADMIN — FENTANYL CITRATE 25 MCG: 50 INJECTION, SOLUTION INTRAMUSCULAR; INTRAVENOUS at 13:54

## 2021-08-26 RX ADMIN — ONDANSETRON 4 MG: 2 INJECTION INTRAMUSCULAR; INTRAVENOUS at 13:37

## 2021-08-26 RX ADMIN — PROPOFOL 150 MG: 10 INJECTION, EMULSION INTRAVENOUS at 11:50

## 2021-08-26 RX ADMIN — SUGAMMADEX 200 MG: 100 INJECTION, SOLUTION INTRAVENOUS at 12:40

## 2021-08-26 RX ADMIN — METOCLOPRAMIDE 10 MG: 5 INJECTION, SOLUTION INTRAMUSCULAR; INTRAVENOUS at 11:46

## 2021-08-26 NOTE — DISCHARGE INSTRUCTIONS
Drink plenty of fluids  Ambulate in house multiple times daily   Light diet  Avoid spicy greasy foods  Shower tomorrow - only soap and water on abdomen  Take prescription as directed  Follow up with Dr. Nancy Ambrosio in 3 - 4 weeks       Gallbladder Removal Surgery: What to Expect at [address] [place] [address]  After your surgery, you will likely feel weak and tired for several days after you return home. Your belly may be swollen. If you had laparoscopic surgery, you may also have pain in your shoulder for about 24 hours. You may have gas or need to burp a lot at first. A few people get diarrhea. The diarrhea usually goes away in 2 to 4 weeks, but it may last longer. How quickly you recover depends on whether you had a laparoscopic or open surgery. · For a laparoscopic surgery, most people can go back to work or their normal routine in 1 to 2 weeks. But it may take longer, depending on the type of work you do. ·   This care sheet gives you a general idea about how long it will take for you to recover. However, each person recovers at a different pace. Follow the steps below to get better as quickly as possible. How can you care for yourself at home? Activity    · Rest when you feel tired. Getting enough sleep will help you recover.     · Try to walk each day. Start out by walking a little more than you did the day before. Gradually increase the amount you walk. Walking boosts blood flow and helps prevent pneumonia and constipation.     · For about 2 to 4 weeks, avoid lifting anything that would make you strain. This may include a child, heavy grocery bags and milk containers, a heavy briefcase or backpack, cat litter or dog food bags, or a vacuum .     · Avoid strenuous activities, such as biking, jogging, weightlifting, and aerobic exercise, until your doctor says it is okay.     · You may shower 24 to 48 hours after surgery, if your doctor okays it. Pat the cut (incision) dry.  Do not take a bath for the first 2 weeks, or until your doctor tells you it is okay.     · You may drive when you are no longer taking pain medicine and can quickly move your foot from the gas pedal to the brake. You must also be able to sit comfortably for a long period of time, even if you do not plan to go far. You might get caught in traffic.     · For a laparoscopic surgery, most people can go back to work or their normal routine in 1 to 2 weeks, but it may take longer.     · Your doctor will tell you when you can have sex again. Diet    · Eat smaller meals more often instead of fewer larger meals. You can eat a normal diet, but avoid eating fatty foods for about 1 month. Fatty foods include hamburger, whole milk, cheese, and many snack foods. If your stomach is upset, try bland, low-fat foods like plain rice, broiled chicken, toast, and yogurt.     · Drink plenty of fluids (unless your doctor tells you not to).   · If you have diarrhea, try avoiding spicy foods, dairy products, fatty foods, and alcohol. You can also watch to see if specific foods cause it, and stop eating them. If the diarrhea continues for more than 2 weeks, talk to your doctor.     · You may notice that your bowel movements are not regular right after your surgery. This is common. Try to avoid constipation and straining with bowel movements. You may want to take a fiber supplement every day. If you have not had a bowel movement after a couple of days, ask your doctor about taking a mild laxative. Medicines    · Your doctor will tell you if and when you can restart your medicines. He or she will also give you instructions about taking any new medicines.     · If you take aspirin or some other blood thinner, ask your doctor if and when to start taking it again. Make sure that you understand exactly what your doctor wants you to do.     · Take pain medicines exactly as directed. ? If the doctor gave you a prescription medicine for pain, take it as prescribed.   ? If you are not taking a prescription pain medicine, take an over-the-counter medicine such as acetaminophen (Tylenol), ibuprofen (Advil, Motrin), or naproxen (Aleve). Read and follow all instructions on the label. ? Do not take two or more pain medicines at the same time unless the doctor told you to. Many pain medicines contain acetaminophen, which is Tylenol. Too much Tylenol can be harmful.     · If you think your pain medicine is making you sick to your stomach:  ? Take your medicine after meals (unless your doctor tells you not to). ? Ask your doctor for a different pain medicine.     · If your doctor prescribed antibiotics, take them as directed. Do not stop taking them just because you feel better. You need to take the full course of antibiotics. Incision care    · If you have strips of tape on the incision, or cut, leave the tape on for a week or until it falls off.     · After 24 to 48 hours, wash the area daily with warm, soapy water, and pat it dry.     · You may have staples to hold the cut together. Keep them dry until your doctor takes them out. This is usually in 7 to 10 days.     · Keep the area clean and dry. You may cover it with a gauze bandage if it weeps or rubs against clothing. Change the bandage every day. Ice    · To reduce swelling and pain, put ice or a cold pack on your belly for 10 to 20 minutes at a time. Do this every 1 to 2 hours. Put a thin cloth between the ice and your skin. Follow-up care is a key part of your treatment and safety. Be sure to make and go to all appointments, and call your doctor if you are having problems. It's also a good idea to know your test results and keep a list of the medicines you take. When should you call for help? Call 911 anytime you think you may need emergency care. For example, call if:    · You passed out (lost consciousness).     · You are short of breath. .   Call your doctor now or seek immediate medical care if:    · You are sick to your stomach and cannot drink fluids.     · You have pain that does not get better when you take your pain medicine.     · You cannot pass stools or gas.     · You have signs of infection, such as:  ? Increased pain, swelling, warmth, or redness. ? Red streaks leading from the incision. ? Pus draining from the incision. ? A fever.     · Bright red blood has soaked through the bandage over your incision.     · You have loose stitches, or your incision comes open.     · You have signs of a blood clot in your leg (called a deep vein thrombosis), such as:  ? Pain in your calf, back of knee, thigh, or groin. ? Redness and swelling in your leg or groin. Watch closely for any changes in your health, and be sure to contact your doctor if you have any problems. Where can you learn more? Go to http://www.gray.com/  Enter F357 in the search box to learn more about \"Gallbladder Removal Surgery: What to Expect at Home. \"  Current as of: April 15, 2020               Content Version: 12.8  © 8956-8346 [employer]. Care instructions adapted under license by [employer] (which disclaims liability or warranty for this information). If you have questions about a medical condition or this instruction, always ask your healthcare professional. [address] any warranty or liability for your use of this information. DISCHARGE SUMMARY from Nurse    PATIENT INSTRUCTIONS:    After general anesthesia or intravenous sedation, for 24 hours or while taking prescription Narcotics:  · Limit your activities  · Do not drive and operate hazardous machinery  · Do not make important personal or business decisions  · Do  not drink alcoholic beverages  · If you have not urinated within 8 hours after discharge, please contact your surgeon on call.     Report the following to your surgeon:  · Excessive pain, swelling, redness or odor of or around the surgical area  · Temperature over 100.5  · Nausea and vomiting lasting longer than 4 hours or if unable to take medications  · Any signs of decreased circulation or nerve impairment to extremity: change in color, persistent  numbness, tingling, coldness or increase pain  · Any questions    What to do at Home:  Recommended activity: Ambulate in house and No lifting, Driving, or Strenuous exercise until advised,     If you experience any of the following symptoms fever, chills, uncontrollable pain , active bleeding , nausea, vomiting, difficulty urinating, please follow up with Dr. Mehul Stokes.    *  Please give a list of your current medications to your Primary Care Provider. *  Please update this list whenever your medications are discontinued, doses are      changed, or new medications (including over-the-counter products) are added. *  Please carry medication information at all times in case of emergency situations. These are general instructions for a healthy lifestyle:    No smoking/ No tobacco products/ Avoid exposure to second hand smoke  Surgeon General's Warning:  Quitting smoking now greatly reduces serious risk to your health. Obesity, smoking, and sedentary lifestyle greatly increases your risk for illness    A healthy diet, regular physical exercise & weight monitoring are important for maintaining a healthy lifestyle    You may be retaining fluid if you have a history of heart failure or if you experience any of the following symptoms:  Weight gain of 3 pounds or more overnight or 5 pounds in a week, increased swelling in our hands or feet or shortness of breath while lying flat in bed. Please call your doctor as soon as you notice any of these symptoms; do not wait until your next office visit. Patient armband removed and shredded    The discharge information has been reviewed with the patient and caregiver. The patient and caregiver verbalized understanding.   Discharge medications reviewed with the patient and caregiver and appropriate educational materials and side effects teaching were provided. Learning About COVID-19 and Social Distancing  What is it? Social distancing means putting space between yourself and other people. The recommended distance is 6 feet, or about 2 meters. This also means staying away from any place where people may gather, such as hayes or other public gathering places. Why is it important? Social distancing is the best way to reduce the spread of COVID-19. This virus seems to spread from person to person through droplets from coughing and sneezing. So if you keep your distance from others, you're less likely to get it or spread it. And social distancing is important for everyone, not just those who are at high risk of infection, like older people. You might have the virus but not have symptoms. You could then give the infection to someone you come into contact with. How is it done? Putting 6 feet, or about 2 meters, between you and other people is the recommended distance. Also stay away from any place where people may gather, such as hayes or other public gathering places. So if possible:  · Work from home, and keep your kids at home. · Don't travel if you don't have to. And avoid public transportation, ride-shares, and taxis unless you have no choice. · Limit shopping to essentials, like food and medicines. · Wear a cloth face cover if you have to go to a public place like the grocery store or pharmacy. · Don't eat in restaurants. (You can still get takeout or food deliveries.)  · Avoid crowds and busy places. Follow stay-at-home orders or other directions for your area. Where can you learn more? Go to http://www.gray.com/  Enter A133 in the search box to learn more about \"Learning About COVID-19 and Social Distancing. \"  Current as of: December 18, 2020               Content Version: 12.8  © 8432-9353 [employer].    Care instructions adapted under license by [employer] (which disclaims liability or warranty for this information). If you have questions about a medical condition or this instruction, always ask your healthcare professional. [address] any warranty or liability for your use of this information.     ___________________________________________________________________________________________________________________________________

## 2021-08-26 NOTE — PERIOP NOTES
Discharge instructions reviewed with patient and family. (daughter ) Opportunity for questions and answers given. No further questions at this time.  Pt states feeling lightheaded - will continue to observe

## 2021-08-26 NOTE — BRIEF OP NOTE
Brief Postoperative Note    Patient: Garett Biggs  YOB: 1955  MRN: [id number]    Date of Procedure: 8/26/2021     Pre-Op Diagnosis: CHOLELITHIASIS, LIKELY CHRONIC CHOLEYSTITIS    Post-Op Diagnosis: Same as preoperative diagnosis.       Procedure(s):  LAPAROSCOPIC CHOLECYSTECTOMY    Surgeon(s):  Luis Ramey MD    Surgical Assistant: Surg Asst-1: Myranda Green    Anesthesia: General     Estimated Blood Loss (mL): Minimal    Complications: None    Specimens:   ID Type Source Tests Collected by Time Destination   1 : GALLBLADDER AND CONTENTS Preservative Gallbladder  Luis Ramey MD 8/26/2021 1210 Pathology        Implants: * No implants in log *    Drains: * No LDAs found *    Findings: CHOLECYSTITIS    Electronically Signed by Cami Hutchison MD on 8/26/2021 at 12:58 PM    Op note dictated #[id number]  RP

## 2021-08-26 NOTE — INTERVAL H&P NOTE
Update History & Physical    The Patient's History and Physical of August 26, 2021 was reviewed with the patient and I examined the patient. There was no change. The surgical site was confirmed by the patient and me. Plan:  The risk, benefits, expected outcome, and alternative to the recommended procedure have been discussed with the patient. Patient understands and wants to proceed with the procedure.     Electronically signed by Reginald Maxwell MD on 8/26/2021 at 11:02 AM

## 2021-08-26 NOTE — OP NOTES
[facility]  OPERATIVE REPORT    Name:  Portillo Hurtado  MR#:   [id number]  :  1955  ACCOUNT #:  [de-identified]  DATE OF SERVICE:  2021    PREOPERATIVE DIAGNOSIS:  Cholelithiasis, likely chronic cholecystitis. POSTOPERATIVE DIAGNOSIS:  Cholelithiasis, likely chronic cholecystitis. PROCEDURE PERFORMED:  Laparoscopic cholecystectomy. SURGEON:  Ami Mathis MD    ASSISTANT:  None. ANESTHESIA:  General.    COMPLICATIONS:  None. SPECIMENS REMOVED:  Gallbladder and contents for permanent pathology. IMPLANTS:  None. ESTIMATED BLOOD LOSS:  Minimal.    DRAINS:  None. PROCEDURE:  The patient is a 76-year-old  female referred to me from primary care for clinical and radiologic findings consistent with chronic cholecystitis secondary to cholelithiasis. She wished to proceed with surgery. Because of her history of supraventricular tachycardia, she was seen and evaluated by Cardiology who cleared her preoperatively. She was taken to the OR on 2021. She was met and identified in the preoperative holding area. She was then brought to the operating room. Preoperative intravenous Ancef 2 g was given per protocol. She was positioned on the table. All pressure points were appropriately padded. Sequential compression devices were applied. General anesthesia was administered with monitors and oxygen in place. The area was prepped and draped in usual sterile fashion. After a surgical pause, surgical sites were locally anesthetized with 0.25% Marcaine with epinephrine. We started with a 5-mm transverse infraumbilical incision with [id number] through skin and dermis. The underlying subcutaneous tissue was bluntly dissected down to the midline fascia.   Then while elevating the adjacent umbilicus using penetrating towel clamp, Veress needle was inserted through the midline fascia, confirmed to be in the correct intra-abdominal position using saline aspiration followed by injection. Once this position was confirmed, Veress needle was used to establish pneumoperitoneum to a pressure of 15 mmHg. Once this pressure was achieved, the Veress needle was removed and a 5-mm bladeless trocar was inserted while elevating the adjacent umbilicus using penetrating towel clamp. A 30-degree laparoscope was inserted. There was no evidence of any Veress needle or trocar-induced trauma to the underlying abdominal viscera. This area was again visualized at the end of the procedure from the subxiphoid location. There were no adhesions in this area and again no evidence of trauma to the underlying abdominal viscera was present. Next, a 12-mm transverse subxiphoid incision was made. Through this, a 12-mm bladeless trocar was inserted just to the right lateral side of the falciform ligament under laparoscopic vision. Next, right upper quadrant right flank 5-mm subcostal incisions were made. Through these, 5-mm bladeless trocars were inserted both under laparoscopic vision. The patient was positioned appropriately, grasped the fundus and retracted towards the anterior abdominal wall in a cephalad retraction. There were some filmy adhesions between the infundibulum and the adjacent omentum. These were taken down using gentle and blunt [place] and peanut dissection. Once the adhesions were clear, it allowed more lateral inferior retraction of the infundibulum. Peritoneum over Calot's triangle was dissected down using blunt dissection. Once Calot's triangle was cleared and the \"critical view\" was achieved including visualization of gallbladder cystic duct junction, the cystic duct was clipped with two clips on the patient's side, one clip on the specimen side, and divided with Endo conner. Cystic artery was then dissected out, isolated, clipped, and divided with three clips on the patient's side, one clip on the specimen side, and divided with Endo conner.   Gallbladder was dissected free from the gallbladder fossa using L-hook cauterization. It was placed into an Endo Catch bag, removed through the subxiphoid incision, and sent for permanent pathology. The gallbladder fossa was irrigated. Irrigation was evacuated as completely as possible. On final inspection, the gallbladder fossa was clean and dry with good hemostasis. No evidence of any biliary leakage. Cystic duct and cystic artery clips were all in good position with no evidence of leakage from any of the sites. Subxiphoid trocar was removed. The fascial defect here was closed with a figure-of-eight 0 Vicryl suture placed using sharp suture passer under laparoscopic vision. The patient was placed in level position. Upper abdominal trocars were removed. Hemostasis was satisfactory at all sites. Pneumoperitoneum was evacuated through the umbilical trocar before it too was removed. Hemostasis was satisfactory at this site as well. All incisions were irrigated. Subxiphoid incision was closed in layers with interrupted 3-0 Vicryl dermal suture and a running 4-0 Monocryl subcuticular suture to close skin. The 5-mm trocar sites were all closed with 4-0 Monocryl subcuticular suture. Incisions were then cleaned, dried, and dressed with Dermabond. The patient tolerated the entire procedure without incident. She was extubated in the OR and taken to recovery room postoperatively in stable condition.       Katia Stoddard MD      [id number]  D:  08/26/2021 12:58  T:  08/26/2021 13:39  JOB #:  [id number]  CC:  Erskin Barthel, MD

## 2021-08-26 NOTE — PERIOP NOTES
Vomited large amount of emesis - pt' daughter states \" she says she feels better - only sleepy\" - vss - P02 on RA - 94 - 98 %

## 2021-08-26 NOTE — ANESTHESIA POSTPROCEDURE EVALUATION
Procedure(s):  LAPAROSCOPIC CHOLECYSTECTOMY. general    Anesthesia Post Evaluation        Comments: Post-Anesthesia Evaluation and Assessment    Cardiovascular Function/Vital Signs  BP (!) 124/56   Pulse 69   Temp 36.2 °C (97.2 °F)   Resp 17   Ht 4' 10\" (1.473 m)   Wt 62.6 kg (138 lb)   SpO2 96%   BMI 28.84 kg/m²     Patient is status post Procedure(s):  LAPAROSCOPIC CHOLECYSTECTOMY. Nausea/Vomiting: Controlled. Postoperative hydration reviewed and adequate. Pain:  Pain Scale 1: FLACC (08/26/21 1408)  Pain Intensity 1: 3 (08/26/21 1408)   Managed. Neurological Status:   Neuro (WDL): Exceptions to WDL (08/26/21 1355)   At baseline. Mental Status and Level of Consciousness: Arousable. Pulmonary Status:   O2 Device: Non-rebreather mask (08/26/21 1302)   Adequate oxygenation and airway patent. Complications related to anesthesia: None    Post-anesthesia assessment completed. No concerns. Patient has met all discharge requirements. Signed By: Stephania Calderon MD    August 26, 2021                   INITIAL Post-op Vital signs:   Vitals Value Taken Time   /57 08/26/21 1410   Temp 36.2 °C (97.2 °F) 08/26/21 1407   Pulse 69 08/26/21 1411   Resp 15 08/26/21 1411   SpO2 96 % 08/26/21 1411   Vitals shown include unvalidated device data.

## 2021-08-26 NOTE — ANESTHESIA PREPROCEDURE EVALUATION
Relevant Problems   CARDIOVASCULAR   (+) Accelerated hypertension   (+) Arrhythmia   (+) Tachyarrhythmia       Anesthetic History     PONV          Review of Systems / Medical History  Patient summary reviewed, nursing notes reviewed and pertinent labs reviewed    Pulmonary  Within defined limits                 Neuro/Psych         Psychiatric history (anxiety)     Cardiovascular    Hypertension        Dysrhythmias       Exercise tolerance: >4 METS     GI/Hepatic/Renal  Within defined limits              Endo/Other  Within defined limits           Other Findings              Physical Exam    Airway  Mallampati: II    Neck ROM: normal range of motion   Mouth opening: Normal     Cardiovascular  Regular rate and rhythm,  S1 and S2 normal,  no murmur, click, rub, or gallop  Rhythm: regular  Rate: normal         Dental    Dentition: Edentulous     Pulmonary  Breath sounds clear to auscultation               Abdominal  GI exam deferred       Other Findings            Anesthetic Plan    ASA: 2  Anesthesia type: general          Induction: Intravenous  Anesthetic plan and risks discussed with: Patient and Son / Daughter      Macedonian speaking, daughter with her to interpret.

## 2021-08-26 NOTE — PERIOP NOTES
Reviewed PTA medication list with patient/caregiver and patient/caregiver denies any additional medications. Patient admits to having a responsible adult care for them at home for at least 24 hours after surgery. Patient encouraged to use gown warming system and informed that using said warming gown to regulate body temperature prior to a procedure has been shown to help reduce the risks of blood clots and infection. Patient's pharmacy of choice verified and documented in PTA medication section. Dual skin assessment & fall risk band verification completed with Phil Fall RN.

## 2021-08-26 NOTE — PERIOP NOTES
Notified Sneha Pierre RN that patient's daughter Chandler Muniz is translating for patient. Patient offered  services through [facility] and declined. Patient wishes to have daughter translate for her. Translation form translated by daughter.

## 2021-12-31 NOTE — ED NOTES
Encounter addended by: Annalisa Kimble MD on: 12/30/2021 8:53 PM   Actions taken: Clinical Note Signed Pt hourly rounding competed. Safety   Pt () resting on stretcher with side rails up and call bell in reach. () in chair    () in parents arms. Toileting   Pt offered ()Bedpan     ()Assistance to Restroom     ()Urinal  Ongoing Updates  Updated on plan of care and status of test results.   Pain Management  Inquired as to comfort and offered comfort measures:    (x) warm blankets   (x) dimmed lights

## 2022-03-18 PROBLEM — S09.90XA HEAD INJURY: Status: ACTIVE | Noted: 2019-10-07

## 2022-03-18 PROBLEM — R11.2 NAUSEA AND VOMITING: Status: ACTIVE | Noted: 2021-03-09

## 2022-03-19 PROBLEM — N39.0 UTI (URINARY TRACT INFECTION): Status: ACTIVE | Noted: 2018-09-18

## 2022-03-19 PROBLEM — R00.0 TACHYARRHYTHMIA: Status: ACTIVE | Noted: 2019-10-08

## 2022-03-19 PROBLEM — R10.13 ABDOMINAL PAIN, EPIGASTRIC: Status: ACTIVE | Noted: 2019-10-07

## 2022-03-19 PROBLEM — K80.50 CHOLEDOCHOLITHIASIS: Status: ACTIVE | Noted: 2018-09-18

## 2022-03-19 PROBLEM — I49.9 ARRHYTHMIA: Status: ACTIVE | Noted: 2021-03-11

## 2022-03-19 PROBLEM — I10 ACCELERATED HYPERTENSION: Status: ACTIVE | Noted: 2021-03-09

## 2022-03-19 PROBLEM — D72.829 LEUKOCYTOSIS: Status: ACTIVE | Noted: 2019-10-07

## 2022-03-19 PROBLEM — R45.89 FEELING ANXIOUS: Status: ACTIVE | Noted: 2019-10-08

## 2022-03-20 PROBLEM — E83.42 HYPOMAGNESEMIA: Status: ACTIVE | Noted: 2018-09-18

## 2023-01-02 ENCOUNTER — HOSPITAL ENCOUNTER (EMERGENCY)
Age: 68
Discharge: HOME | End: 2023-01-03
Attending: EMERGENCY MEDICINE
Payer: MEDICARE

## 2023-01-02 ENCOUNTER — APPOINTMENT (OUTPATIENT)
Dept: GENERAL RADIOLOGY | Age: 68
End: 2023-01-02
Attending: EMERGENCY MEDICINE
Payer: MEDICARE

## 2023-01-02 DIAGNOSIS — J20.9 ACUTE BRONCHITIS, UNSPECIFIED ORGANISM: Primary | ICD-10-CM

## 2023-01-02 LAB
ALBUMIN SERPL-MCNC: 3.6 G/DL (ref 3.4–5)
ALBUMIN/GLOB SERPL: 0.8 {RATIO} (ref 0.8–1.7)
ALP SERPL-CCNC: 115 U/L (ref 45–117)
ALT SERPL-CCNC: 18 U/L (ref 13–56)
ANION GAP SERPL CALC-SCNC: 6 MMOL/L (ref 3–18)
AST SERPL-CCNC: 23 U/L (ref 10–38)
BASOPHILS # BLD: 0 K/UL (ref 0–0.1)
BASOPHILS NFR BLD: 0 % (ref 0–2)
BILIRUB SERPL-MCNC: 0.4 MG/DL (ref 0.2–1)
BNP SERPL-MCNC: 748 PG/ML (ref 0–900)
BUN SERPL-MCNC: 42 MG/DL (ref 7–18)
BUN/CREAT SERPL: 20 (ref 12–20)
CALCIUM SERPL-MCNC: 9.6 MG/DL (ref 8.5–10.1)
CHLORIDE SERPL-SCNC: 111 MMOL/L (ref 100–111)
CK SERPL-CCNC: 53 U/L (ref 26–192)
CO2 SERPL-SCNC: 19 MMOL/L (ref 21–32)
CREAT SERPL-MCNC: 2.06 MG/DL (ref 0.6–1.3)
DIFFERENTIAL METHOD BLD: ABNORMAL
EOSINOPHIL # BLD: 0 K/UL (ref 0–0.4)
EOSINOPHIL NFR BLD: 1 % (ref 0–5)
ERYTHROCYTE [DISTWIDTH] IN BLOOD BY AUTOMATED COUNT: 15.4 % (ref 11.6–14.5)
FLUAV RNA SPEC QL NAA+PROBE: NOT DETECTED
FLUBV RNA SPEC QL NAA+PROBE: NOT DETECTED
GLOBULIN SER CALC-MCNC: 4.4 G/DL (ref 2–4)
GLUCOSE SERPL-MCNC: 135 MG/DL (ref 74–99)
HCT VFR BLD AUTO: 27.5 % (ref 35–45)
HGB BLD-MCNC: 8.7 G/DL (ref 12–16)
IMM GRANULOCYTES # BLD AUTO: 0 K/UL (ref 0–0.04)
IMM GRANULOCYTES NFR BLD AUTO: 0 % (ref 0–0.5)
LYMPHOCYTES # BLD: 0.8 K/UL (ref 0.9–3.6)
LYMPHOCYTES NFR BLD: 16 % (ref 21–52)
MAGNESIUM SERPL-MCNC: 1.6 MG/DL (ref 1.6–2.6)
MCH RBC QN AUTO: 25.6 PG (ref 24–34)
MCHC RBC AUTO-ENTMCNC: 31.6 G/DL (ref 31–37)
MCV RBC AUTO: 80.9 FL (ref 78–100)
MONOCYTES # BLD: 0.2 K/UL (ref 0.05–1.2)
MONOCYTES NFR BLD: 5 % (ref 3–10)
NEUTS SEG # BLD: 3.8 K/UL (ref 1.8–8)
NEUTS SEG NFR BLD: 78 % (ref 40–73)
NRBC # BLD: 0 K/UL (ref 0–0.01)
NRBC BLD-RTO: 0 PER 100 WBC
PLATELET # BLD AUTO: 312 K/UL (ref 135–420)
PMV BLD AUTO: 10.1 FL (ref 9.2–11.8)
POTASSIUM SERPL-SCNC: 4.9 MMOL/L (ref 3.5–5.5)
PROT SERPL-MCNC: 8 G/DL (ref 6.4–8.2)
RBC # BLD AUTO: 3.4 M/UL (ref 4.2–5.3)
SARS-COV-2, COV2: NOT DETECTED
SODIUM SERPL-SCNC: 136 MMOL/L (ref 136–145)
TROPONIN-HIGH SENSITIVITY: 7 NG/L (ref 0–54)
WBC # BLD AUTO: 4.9 K/UL (ref 4.6–13.2)

## 2023-01-02 PROCEDURE — 74011250637 HC RX REV CODE- 250/637: Performed by: EMERGENCY MEDICINE

## 2023-01-02 PROCEDURE — A9270 NON-COVERED ITEM OR SERVICE: HCPCS | Performed by: EMERGENCY MEDICINE

## 2023-01-02 PROCEDURE — 71045 X-RAY EXAM CHEST 1 VIEW: CPT

## 2023-01-02 PROCEDURE — 93005 ELECTROCARDIOGRAM TRACING: CPT

## 2023-01-02 PROCEDURE — 84484 ASSAY OF TROPONIN QUANT: CPT

## 2023-01-02 PROCEDURE — 85025 COMPLETE CBC W/AUTO DIFF WBC: CPT

## 2023-01-02 PROCEDURE — 83735 ASSAY OF MAGNESIUM: CPT

## 2023-01-02 PROCEDURE — 80053 COMPREHEN METABOLIC PANEL: CPT

## 2023-01-02 PROCEDURE — 99285 EMERGENCY DEPT VISIT HI MDM: CPT

## 2023-01-02 PROCEDURE — 74011250636 HC RX REV CODE- 250/636: Performed by: EMERGENCY MEDICINE

## 2023-01-02 PROCEDURE — 83880 ASSAY OF NATRIURETIC PEPTIDE: CPT

## 2023-01-02 PROCEDURE — 74011636637 HC RX REV CODE- 636/637: Performed by: EMERGENCY MEDICINE

## 2023-01-02 PROCEDURE — 82550 ASSAY OF CK (CPK): CPT

## 2023-01-02 PROCEDURE — 87636 SARSCOV2 & INF A&B AMP PRB: CPT

## 2023-01-02 RX ORDER — PREDNISONE 20 MG/1
60 TABLET ORAL
Status: COMPLETED | OUTPATIENT
Start: 2023-01-02 | End: 2023-01-02

## 2023-01-02 RX ORDER — AZITHROMYCIN 250 MG/1
500 TABLET, FILM COATED ORAL
Status: COMPLETED | OUTPATIENT
Start: 2023-01-02 | End: 2023-01-02

## 2023-01-02 RX ADMIN — AZITHROMYCIN MONOHYDRATE 500 MG: 250 TABLET ORAL at 22:26

## 2023-01-02 RX ADMIN — PREDNISONE 60 MG: 20 TABLET ORAL at 22:26

## 2023-01-02 RX ADMIN — SODIUM CHLORIDE 500 ML: 9 INJECTION, SOLUTION INTRAVENOUS at 22:26

## 2023-01-03 VITALS
RESPIRATION RATE: 16 BRPM | TEMPERATURE: 98.3 F | WEIGHT: 127 LBS | SYSTOLIC BLOOD PRESSURE: 124 MMHG | BODY MASS INDEX: 25.6 KG/M2 | HEIGHT: 59 IN | OXYGEN SATURATION: 98 % | DIASTOLIC BLOOD PRESSURE: 47 MMHG | HEART RATE: 62 BPM

## 2023-01-03 LAB
ATRIAL RATE: 65 BPM
CALCULATED P AXIS, ECG09: 80 DEGREES
CALCULATED R AXIS, ECG10: 92 DEGREES
CALCULATED T AXIS, ECG11: 70 DEGREES
DIAGNOSIS, 93000: NORMAL
P-R INTERVAL, ECG05: 154 MS
Q-T INTERVAL, ECG07: 416 MS
QRS DURATION, ECG06: 92 MS
QTC CALCULATION (BEZET), ECG08: 432 MS
VENTRICULAR RATE, ECG03: 65 BPM

## 2023-01-03 RX ORDER — PREDNISONE 20 MG/1
40 TABLET ORAL DAILY
Qty: 10 TABLET | Refills: 0 | Status: SHIPPED | OUTPATIENT
Start: 2023-01-03 | End: 2023-01-08

## 2023-01-03 RX ORDER — AZITHROMYCIN 250 MG/1
TABLET, FILM COATED ORAL
Qty: 4 TABLET | Refills: 0 | Status: SHIPPED | OUTPATIENT
Start: 2023-01-03

## 2023-01-03 RX ORDER — BENZONATATE 100 MG/1
100 CAPSULE ORAL
Qty: 30 CAPSULE | Refills: 0 | Status: SHIPPED | OUTPATIENT
Start: 2023-01-03 | End: 2023-01-10

## 2023-01-03 NOTE — ED NOTES
Patient revaluated after lab and x-ray result condition stable then discharge home to  prescription, left in company of daughter instructions given

## 2023-01-06 NOTE — ED PROVIDER NOTES
EMERGENCY DEPARTMENT HISTORY AND PHYSICAL EXAM    Date: 1/2/2023  Patient Name: Mg Rasheed    History of Presenting Illness     Chief Complaint   Patient presents with    Fatigue         History Provided By: Patient    Additional History (Context):   7:45 PM  Mg Rasheed is a 69 y.o. female with PMHX of SVT, hypertension, and mixed hyperlipidemia, anxiety who presents to the emergency department C/O cough and generalized weakness. Patient states she has had symptoms going on for a week which included nonproductive cough and generalized weakness and fatigue. She denies any chest pain palpitations. She has a mild headache. She is uncertain exposures to other sick or ill. There is no vomiting or diarrhea. She has a history of previous SVT but has not experienced racing or pounding of her chest.  She does have generalized body aches. She is vaccinated against COVID. Social History  Denies tobacco or alcohol use    Family History  Negative for premature heart disease or asthma. PCP: Renee Ayala MD    Current Outpatient Medications   Medication Sig Dispense Refill    azithromycin (Zithromax Z-Reyes) 250 mg tablet Take 1st dose 24 hours after initial dose that was given in the ER. Then take 1 tablet daily until finished. 4 Tablet 0    predniSONE (DELTASONE) 20 mg tablet Take 2 Tablets by mouth daily for 5 days. With Breakfast 10 Tablet 0    benzonatate (Tessalon Perles) 100 mg capsule Take 1 Capsule by mouth three (3) times daily as needed for Cough for up to 7 days. 30 Capsule 0    ondansetron (ZOFRAN ODT) 8 mg disintegrating tablet Take 1 Tablet by mouth every eight (8) hours as needed for Nausea or Vomiting. Indications: prevent nausea and vomiting after surgery 12 Tablet 0    omeprazole (PRILOSEC) 40 mg capsule Take 40 mg by mouth daily. LORazepam (ATIVAN) 1 mg tablet Take 1 mg by mouth every four (4) hours as needed for Anxiety.       atorvastatin (LIPITOR) 40 mg tablet Take 40 mg by mouth nightly. losartan-hydroCHLOROthiazide (HYZAAR) 100-25 mg per tablet Take 1 Tab by mouth daily. spironolactone (ALDACTONE) 25 mg tablet Take 25 mg by mouth daily. hydrALAZINE (APRESOLINE) 25 mg tablet Take 1 Tab by mouth three (3) times daily. (Patient taking differently: Take 100 mg by mouth three (3) times daily.) 90 Tab 0    atenolol (TENORMIN) 100 mg tablet Take 1 Tab by mouth two (2) times a day. 30 Tab 0    amLODIPine (NORVASC) 10 mg tablet Take 1 Tab by mouth daily.  (Patient taking differently: Take 10 mg by mouth nightly.) 30 Tab 0     Facility-Administered Medications Ordered in Other Encounters   Medication Dose Route Frequency Provider Last Rate Last Admin    esmolol (BREVIBLOC) 100 mg/10 mL (10 mg/mL) injection   IntraVENous PRN Familia Farah V, CRNA   10 mg at 09/19/18 1516    fentaNYL citrate (PF) injection   IntraVENous PRN Char Kang, CRNA   25 mcg at 09/19/18 1518    midazolam (VERSED) injection   IntraVENous PRN Sheryl Chi V, CRNA   1 mg at 09/19/18 1508    lidocaine (PF) (XYLOCAINE) 20 mg/mL (2 %) injection   IntraVENous PRN Sheryl Chi V, CRNA   40 mg at 09/19/18 1528    rocuronium (ZEMURON) injection   IntraVENous PRN Sheryl Chi V, CRNA   10 mg at 09/19/18 1552    PHENYLephrine (KELSEY-SYNEPHRINE) 10,000 mcg in 0.9% sodium chloride 100 mL infusion  10,000 mcg IntraVENous CONTINUOUS Familia Farah V, CRNA   200 mcg at 09/19/18 1546    glucagon (GLUCAGEN) injection   IntraVENous PRN Eufemia Jaramillo MD   1 mg at 09/19/18 1634    ondansetron (ZOFRAN) injection    PRN Eufemia Jaramillo MD   4 mg at 09/19/18 1615    lactated Ringers infusion   IntraVENous CONTINUOUS LothesMyrtie Ramming, DO   New Bag at 09/19/18 1636    glycopyrrolate (ROBINUL) injection   IntraVENous PRN Joelene Favor, DO   0.4 mg at 09/19/18 1641    ketorolac (TORADOL) injection   IntraVENous PRN Joelene Favor, DO   15 mg at 09/19/18 4248       Past History     Past Medical History:  Past Medical History:   Diagnosis Date    Arrhythmia     \"my heart beats fast\" \" I am awaiting results from Cardiologist\"    Hypertension     Psychiatric disorder     anxiety       Past Surgical History:  No past surgical history on file. Family History:  History reviewed. No pertinent family history. Social History:  Social History     Tobacco Use    Smoking status: Never    Smokeless tobacco: Never   Vaping Use    Vaping Use: Never used   Substance Use Topics    Alcohol use: No    Drug use: Never       Allergies:  No Known Allergies      Review of Systems   Review of Systems   Constitutional:  Positive for fatigue. Respiratory:  Positive for cough. Musculoskeletal:  Positive for myalgias. Neurological:  Positive for weakness (Generalized). Physical Exam     Vitals:    01/02/23 2223 01/02/23 2230 01/02/23 2301 01/03/23 0045   BP:  (!) 119/45 (!) 131/48 (!) 124/47   Pulse: 62 65 69 62   Resp: 22 19 20 16   Temp:       SpO2: 99%  99% 98%   Weight:       Height:         Physical Exam  Vitals and nursing note reviewed. Constitutional:       General: She is not in acute distress. Appearance: She is well-developed. She is not diaphoretic. HENT:      Head: Normocephalic and atraumatic. Eyes:      General: No scleral icterus. Extraocular Movements:      Right eye: Normal extraocular motion. Left eye: Normal extraocular motion. Conjunctiva/sclera: Conjunctivae normal.      Pupils: Pupils are equal, round, and reactive to light. Neck:      Trachea: No tracheal deviation. Cardiovascular:      Rate and Rhythm: Normal rate and regular rhythm. Heart sounds: Normal heart sounds. Pulmonary:      Effort: Pulmonary effort is normal. No respiratory distress. Breath sounds: Normal breath sounds. No stridor. Abdominal:      General: Bowel sounds are normal. There is no distension. Palpations: Abdomen is soft. Tenderness: There is no abdominal tenderness.  There is no rebound. Musculoskeletal:         General: No tenderness. Normal range of motion. Cervical back: Normal range of motion and neck supple. Comments: Grossly unremarkable without abnormalities   Skin:     General: Skin is warm and dry. Capillary Refill: Capillary refill takes less than 2 seconds. Findings: No erythema or rash. Neurological:      Mental Status: She is alert and oriented to person, place, and time. GCS: GCS eye subscore is 4. GCS verbal subscore is 5. GCS motor subscore is 6. Cranial Nerves: No cranial nerve deficit. Motor: No weakness. Coordination: Coordination is intact. Psychiatric:         Mood and Affect: Mood normal.         Behavior: Behavior normal.         Thought Content:  Thought content normal.         Judgment: Judgment normal.     Diagnostic Study Results     Labs -  Lab Results         Contains abnormal data CBC WITH AUTOMATED DIFF (Final result)   Component (Lab Inquiry)  Collection Time Result Time WBC RBC HGB HCT MCV MCH MCHC RDW PLT MPLV   01/02/23 20:00:00 01/02/23 20:19:24 4.9 3.40 Low  8.7 Low  27.5 Low  80.9 25.6 31.6 15.4 High  312 10.1   Previous Results   08/13/21 14:45:00 08/13/21 15:25:16 9.9 3.49 Low  9.6 Low  30.5 Low  87.4 27.5 31.5 13.1 368 9.4   04/30/21 22:50:00 04/30/21 23:16:36 14.6 High  4.12 Low  11.6 Low  35.4 85.9 28.2 32.8 13.1 378 10.1   03/11/21 01:25:00 03/11/21 03:54:34 8.3 3.37 Low  9.4 Low  29.3 Low  86.9 27.9 32.1 12.7 288 9.8   03/10/21 03:40:00 03/10/21 04:14:14 7.1 3.29 Low  8.9 Low  29.2 Low  88.8 27.1 30.5 Low  13.1 259 9.6   03/09/21 11:20:00 03/09/21 11:43:32 12.2 3.57 Low  10.1 Low  31.1 Low  87.1 28.3 32.5 13.0 338 9.8       Collection Time Result Time NRBC ANRBC GRANS LYMPH MONOS EOS BASOS IG ABG ABL   01/02/23 20:00:00 01/02/23 20:19:24 0.0 0.00 78 High  16 Low  5 1 0 0 3.8 0.8 Low    Previous Results   08/13/21 14:45:00 08/13/21 15:25:16   69 20 Low  8 2 1  6.8 2.0   04/30/21 22:50:00 04/30/21 23:16:36   77 High  15 Low  7 1 0  11.2 High  2.3   03/11/21 01:25:00 03/11/21 03:54:34             03/10/21 03:40:00 03/10/21 04:14:14             03/09/21 11:20:00 03/09/21 11:43:32                 Collection Time Result Time ABM VALDEZ ABB AIG DF   01/02/23 20:00:00 01/02/23 20:19:24 0.2 0.0 0.0 0.0 AUTOMATED   Previous Results   08/13/21 14:45:00 08/13/21 15:25:16 0.7 0.2 0.1  AUTOMATED   04/30/21 22:50:00 04/30/21 23:16:36 1.0 0.1 0.1  AUTOMATED   03/11/21 01:25:00 03/11/21 03:54:34        03/10/21 03:40:00 03/10/21 04:14:14        03/09/21 11:20:00 03/09/21 11:43:32              Final result                           Contains abnormal data METABOLIC PANEL, COMPREHENSIVE (Final result)   Component (Lab Inquiry)  Collection Time Result Time NA K CL CO2 AGAP GLU BUN CREA BUCR EGFR   01/02/23 20:00:00 01/02/23 20:31:29 136 4.9 111 19 Low  6 135 High  42 High  2.06 High  20 26        Pediatric calcula. ..  Low    Previous Results   08/13/21 14:45:00 08/13/21 15:35:56 140 4.7 110 27 3 124 High  27 High  1.56 High  17    04/30/21 22:50:00 04/30/21 23:36:01 138 3.9 108 23 7 134 High  30 High  1.58 High  19    03/11/21 01:25:00 03/11/21 04:07:48 141 4.0 107 28 6 121 High  26 High  1.31 High  20    03/10/21 03:40:00 03/10/21 04:25:48 142 4.0 108 29 5 121 High  30 High  1.45 High  21 High     03/09/21 04:18:00 03/09/21 04:43:50 142 3.4 Low  110 25 7 106 High  19 High  1.18 16        Collection Time Result Time CA TBIL GPT SGOT AP TP ALB GLOB AGRAT   01/02/23 20:00:00 01/02/23 20:31:29 9.6 0.4 18 23 115 8.0 3.6 4.4 High  0.8   Previous Results   08/13/21 14:45:00 08/13/21 15:35:56 9.0           04/30/21 22:50:00 04/30/21 23:36:01 9.8 0.3 18 24 88 8.1 4.2 3.9 1.1   03/11/21 01:25:00 03/11/21 04:07:48 8.9           03/10/21 03:40:00 03/10/21 04:25:48 9.1           03/09/21 04:18:00 03/09/21 04:43:50 8.9                 Final result                          TROPONIN-HIGH SENSITIVITY (Final result)   Component (Lab Inquiry)  Collection Time Result Time TROPHS   01/02/23 20:00:00 01/02/23 20:30:39 7   A HS troponin value ch. .. Final result                          NT-PRO BNP (Final result)   Component (Lab Inquiry)  Collection Time Result Time PBNP   01/02/23 20:00:00 01/02/23 20:31:29 748              For patients. .. Previous Results   04/30/21 22:50:00 04/30/21 23:36:01 434              For patients. ..   12/03/20 20:27:00 12/03/20 21:11:04 479              For patients. .. Final result                          MAGNESIUM (Final result)   Component (Lab Inquiry)  Collection Time Result Time MG   01/02/23 20:00:00 01/02/23 20:27:38 1.6   Previous Results   04/30/21 22:50:00 04/30/21 23:35:43 1.9   03/11/21 01:25:00 03/11/21 04:07:48 1.9   03/10/21 03:40:00 03/10/21 04:25:48 2.2   03/09/21 04:18:00 03/09/21 04:43:50 1.8   03/08/21 22:50:00 03/08/21 23:18:56 1.8         Final result                          CK (Final result)   Component (Lab Inquiry)  Collection Time Result Time CPK   01/02/23 20:00:00 01/02/23 20:31:28 53   Previous Results   05/01/21 00:30:00 05/01/21 01:03:09 145   04/30/21 22:50:00 04/30/21 23:27:20 156   03/09/21 04:18:00 03/09/21 04:48:43 249 High    03/08/21 22:50:00 03/08/21 23:20:47 241 High    12/03/20 22:00:00 12/03/20 22:40:01 194 High          Final result                          COVID-19 WITH INFLUENZA A/B (Final result)   Component (Lab Inquiry)  Collection Time Result Time SARS-CoV-2 INFAPT INFBPT   01/02/23 18:50:00 01/02/23 19:36:36 Not detected   Not Detected results d. .. Not detected Not detected   Testing was performed . .. Final result              Radiologic Studies -   Preliminary findings  Portable chest x-ray  No visible abnormalities  Final radiology report pending    XR CHEST PORT   Final Result      No active cardiopulmonary disease or significant change.          CT Results  (Last 48 hours)      None          CXR Results  (Last 48 hours)      None Medications given in the ED-  Medications   sodium chloride 0.9 % bolus infusion 500 mL (0 mL IntraVENous Stopped 1/3/23 0046)   predniSONE (DELTASONE) tablet 60 mg (60 mg Oral Given 1/2/23 2226)   azithromycin (ZITHROMAX) tablet 500 mg (500 mg Oral Given 1/2/23 2226)         Medical Decision Making   I am the first provider for this patient. I reviewed the vital signs, available nursing notes, past medical history, past surgical history, family history and social history. Vital Signs-Reviewed the patient's vital signs. Pulse Oximetry Analysis - 98% on room air    Cardiac Monitor:  Rate: 67 bpm  Rhythm: Sinus rhythm    EKG interpretation: (Preliminary)  6:37 PM   EKG read as no STEMI by my colleagues  7:45 PM  Normal sinus rhythm with respiratory variability, incomplete right bundle branch block, no significant ST segment abnormality, QTC is 432. EKG read by Shay Rojo MD      Records Reviewed: NURSING NOTES AND PREVIOUS MEDICAL RECORDS    Provider Notes (Medical Decision Making):   Patient negative for COVID and flu with ongoing cough lasting a week. Chest x-ray shows no particular abnormality. She does have advanced age and history of cardiac disease therefore will cover for atypical bacterial pneumonia or walking pneumonia. Unlikely this is CHF. Given PERC rules and gestalt I do not feel this is pulmonary embolism although she is 69years of age. We discussed risks and benefits of presumptive treatment. Her kidneys do show more strain with a drop in GFR and would avoid CTA because of this. Electrolytes came back unremarkable including mag which is borderline low, troponin proBNP unremarkable for cardiac strain or injury. Given worsening of her renal condition will be difficult to assess for PE. Still I do not think this is her primary issue. She is anemic but does not qualify for blood transfusion.   Kidneys and anemia are likely tied into 1 each other.    Procedures:  Procedures    ED Course:   7:45 PM : Initial assessment performed. The patients presenting problems have been discussed, and they are in agreement with the care plan formulated and outlined with them. I have encouraged them to ask questions as they arise throughout their visit. Diagnosis and Disposition       DISCHARGE NOTE:  12:24 AM  Ani Soto's  results have been reviewed with her. She has been counseled regarding her diagnosis, treatment, and plan. She verbally conveys understanding and agreement of the signs, symptoms, diagnosis, treatment and prognosis and additionally agrees to follow up as discussed. She also agrees with the care-plan and conveys that all of her questions have been answered. I have also provided discharge instructions for her that include: educational information regarding their diagnosis and treatment, and list of reasons why they would want to return to the ED prior to their follow-up appointment, should her condition change. She has been provided with education for proper emergency department utilization. CLINICAL IMPRESSION:    1. Acute bronchitis, unspecified organism        PLAN:  1. D/C Home  2. Discharge Medication List as of 1/3/2023 12:25 AM        START taking these medications    Details   azithromycin (Zithromax Z-Reyes) 250 mg tablet Take 1st dose 24 hours after initial dose that was given in the ER. Then take 1 tablet daily until finished., Normal, Disp-4 Tablet, R-0      predniSONE (DELTASONE) 20 mg tablet Take 2 Tablets by mouth daily for 5 days. With Breakfast, Normal, Disp-10 Tablet, R-0      benzonatate (Tessalon Perles) 100 mg capsule Take 1 Capsule by mouth three (3) times daily as needed for Cough for up to 7 days. , Normal, Disp-30 Capsule, R-0           CONTINUE these medications which have NOT CHANGED    Details   ondansetron (ZOFRAN ODT) 8 mg disintegrating tablet Take 1 Tablet by mouth every eight (8) hours as needed for Nausea or Vomiting. Indications: prevent nausea and vomiting after surgery, Normal, Disp-12 Tablet, R-0      omeprazole (PRILOSEC) 40 mg capsule Take 40 mg by mouth daily. , Historical Med      LORazepam (ATIVAN) 1 mg tablet Take 1 mg by mouth every four (4) hours as needed for Anxiety. , Historical Med      atorvastatin (LIPITOR) 40 mg tablet Take 40 mg by mouth nightly., Historical Med      losartan-hydroCHLOROthiazide (HYZAAR) 100-25 mg per tablet Take 1 Tab by mouth daily. , Historical Med      spironolactone (ALDACTONE) 25 mg tablet Take 25 mg by mouth daily. , Historical Med      hydrALAZINE (APRESOLINE) 25 mg tablet Take 1 Tab by mouth three (3) times daily. , Print, Disp-90 Tab, R-0      atenolol (TENORMIN) 100 mg tablet Take 1 Tab by mouth two (2) times a day., Print, Disp-30 Tab, R-0      amLODIPine (NORVASC) 10 mg tablet Take 1 Tab by mouth daily. , Print, Disp-30 Tab, R-0           3. Follow-up Information       Follow up With Specialties Details Why Contact Burgess Amie MD Family Medicine In 1 week  [address] [place] [address]  [phone]            _______________________________    This note was partially transcribed via voice recognition software. Although efforts have been made to catch any discrepancies, it may contain sound alike words, grammatical errors, or nonsensical words.

## 2023-01-30 ENCOUNTER — APPOINTMENT (OUTPATIENT)
Dept: CT IMAGING | Age: 68
End: 2023-01-30
Attending: PHYSICIAN ASSISTANT
Payer: MEDICARE

## 2023-01-30 ENCOUNTER — HOSPITAL ENCOUNTER (INPATIENT)
Age: 68
LOS: 3 days | Discharge: HOME | End: 2023-02-02
Attending: EMERGENCY MEDICINE | Admitting: FAMILY MEDICINE
Payer: MEDICARE

## 2023-01-30 DIAGNOSIS — R19.00 PELVIC MASS: ICD-10-CM

## 2023-01-30 DIAGNOSIS — N17.9 AKI (ACUTE KIDNEY INJURY) (HCC): ICD-10-CM

## 2023-01-30 DIAGNOSIS — D64.9 SYMPTOMATIC ANEMIA: Primary | ICD-10-CM

## 2023-01-30 LAB
ALBUMIN SERPL-MCNC: 3.5 G/DL (ref 3.4–5)
ALBUMIN/GLOB SERPL: 0.8 (ref 0.8–1.7)
ALP SERPL-CCNC: 84 U/L (ref 45–117)
ALT SERPL-CCNC: 15 U/L (ref 13–56)
ANION GAP SERPL CALC-SCNC: 8 MMOL/L (ref 3–18)
AST SERPL-CCNC: 11 U/L (ref 10–38)
BASOPHILS # BLD: 0 K/UL (ref 0–0.1)
BASOPHILS NFR BLD: 0 % (ref 0–2)
BILIRUB SERPL-MCNC: 0.3 MG/DL (ref 0.2–1)
BUN SERPL-MCNC: 45 MG/DL (ref 7–18)
BUN/CREAT SERPL: 23 (ref 12–20)
CALCIUM SERPL-MCNC: 9.4 MG/DL (ref 8.5–10.1)
CHLORIDE SERPL-SCNC: 111 MMOL/L (ref 100–111)
CO2 SERPL-SCNC: 17 MMOL/L (ref 21–32)
CREAT SERPL-MCNC: 1.98 MG/DL (ref 0.6–1.3)
DIFFERENTIAL METHOD BLD: ABNORMAL
EOSINOPHIL # BLD: 0 K/UL (ref 0–0.4)
EOSINOPHIL NFR BLD: 0 % (ref 0–5)
ERYTHROCYTE [DISTWIDTH] IN BLOOD BY AUTOMATED COUNT: 16.4 % (ref 11.6–14.5)
GLOBULIN SER CALC-MCNC: 4.6 G/DL (ref 2–4)
GLUCOSE SERPL-MCNC: 260 MG/DL (ref 74–99)
HCT VFR BLD AUTO: 24.3 % (ref 35–45)
HEMOCCULT STL QL: NEGATIVE
HGB BLD-MCNC: 7.4 G/DL (ref 12–16)
IMM GRANULOCYTES # BLD AUTO: 0.1 K/UL (ref 0–0.04)
IMM GRANULOCYTES NFR BLD AUTO: 1 % (ref 0–0.5)
LYMPHOCYTES # BLD: 0.6 K/UL (ref 0.9–3.6)
LYMPHOCYTES NFR BLD: 10 % (ref 21–52)
MCH RBC QN AUTO: 25.5 PG (ref 24–34)
MCHC RBC AUTO-ENTMCNC: 30.5 G/DL (ref 31–37)
MCV RBC AUTO: 83.8 FL (ref 78–100)
MONOCYTES # BLD: 0.2 K/UL (ref 0.05–1.2)
MONOCYTES NFR BLD: 4 % (ref 3–10)
NEUTS SEG # BLD: 5.2 K/UL (ref 1.8–8)
NEUTS SEG NFR BLD: 85 % (ref 40–73)
NRBC # BLD: 0 K/UL (ref 0–0.01)
NRBC BLD-RTO: 0 PER 100 WBC
PLATELET # BLD AUTO: 476 K/UL (ref 135–420)
PMV BLD AUTO: 9.3 FL (ref 9.2–11.8)
POTASSIUM SERPL-SCNC: 5.9 MMOL/L (ref 3.5–5.5)
PROT SERPL-MCNC: 8.1 G/DL (ref 6.4–8.2)
RBC # BLD AUTO: 2.9 M/UL (ref 4.2–5.3)
SODIUM SERPL-SCNC: 136 MMOL/L (ref 136–145)
TROPONIN-HIGH SENSITIVITY: 24 NG/L (ref 0–54)
WBC # BLD AUTO: 6.1 K/UL (ref 4.6–13.2)

## 2023-01-30 PROCEDURE — 84484 ASSAY OF TROPONIN QUANT: CPT

## 2023-01-30 PROCEDURE — 93005 ELECTROCARDIOGRAM TRACING: CPT

## 2023-01-30 PROCEDURE — 65270000029 HC RM PRIVATE

## 2023-01-30 PROCEDURE — 74011250637 HC RX REV CODE- 250/637: Performed by: PHYSICIAN ASSISTANT

## 2023-01-30 PROCEDURE — 86900 BLOOD TYPING SEROLOGIC ABO: CPT

## 2023-01-30 PROCEDURE — 85025 COMPLETE CBC W/AUTO DIFF WBC: CPT

## 2023-01-30 PROCEDURE — 80053 COMPREHEN METABOLIC PANEL: CPT

## 2023-01-30 PROCEDURE — 86923 COMPATIBILITY TEST ELECTRIC: CPT

## 2023-01-30 PROCEDURE — 82272 OCCULT BLD FECES 1-3 TESTS: CPT

## 2023-01-30 PROCEDURE — 99285 EMERGENCY DEPT VISIT HI MDM: CPT

## 2023-01-30 PROCEDURE — 74176 CT ABD & PELVIS W/O CONTRAST: CPT

## 2023-01-30 RX ORDER — SODIUM POLYSTYRENE SULFONATE 15 G/60ML
30 SUSPENSION ORAL; RECTAL
Status: COMPLETED | OUTPATIENT
Start: 2023-01-30 | End: 2023-01-30

## 2023-01-30 RX ADMIN — SODIUM POLYSTYRENE SULFONATE 30 G: 15 SUSPENSION ORAL; RECTAL at 23:27

## 2023-01-30 NOTE — Clinical Note
Status[de-identified] INPATIENT [101]   Type of Bed: Medical [8]   Cardiac Monitoring Required?: No   Inpatient Hospitalization Certified Necessary for the Following Reasons: 3.  Patient receiving treatment that can only be provided in an inpatient setting (further clarification in H&P documentation)   Admitting Diagnosis: Symptomatic anemia [9853025]   Admitting Physician: Tiffany Espinosa [[id number]]   Attending Physician: Tiffany Espinosa [[id number]]   Estimated Length of Stay: 2 Midnights   Discharge Plan[de-identified] Home with Office Follow-up

## 2023-01-31 PROBLEM — I10 HTN (HYPERTENSION): Status: ACTIVE | Noted: 2023-01-31

## 2023-01-31 PROBLEM — N17.9 AKI (ACUTE KIDNEY INJURY) (HCC): Status: ACTIVE | Noted: 2023-01-31

## 2023-01-31 PROBLEM — R19.00 MASS OF PELVIS: Status: ACTIVE | Noted: 2023-01-31

## 2023-01-31 LAB
ANION GAP SERPL CALC-SCNC: 9 MMOL/L (ref 3–18)
BUN SERPL-MCNC: 39 MG/DL (ref 7–18)
BUN/CREAT SERPL: 21 (ref 12–20)
CALCIUM SERPL-MCNC: 10.1 MG/DL (ref 8.5–10.1)
CALCIUM SERPL-MCNC: 9.7 MG/DL (ref 8.5–10.1)
CHLORIDE SERPL-SCNC: 116 MMOL/L (ref 100–111)
CO2 SERPL-SCNC: 17 MMOL/L (ref 21–32)
CREAT SERPL-MCNC: 1.82 MG/DL (ref 0.6–1.3)
ERYTHROCYTE [DISTWIDTH] IN BLOOD BY AUTOMATED COUNT: 16.2 % (ref 11.6–14.5)
FERRITIN SERPL-MCNC: 56 NG/ML (ref 8–388)
FOLATE SERPL-MCNC: >20 NG/ML (ref 3.1–17.5)
GLUCOSE SERPL-MCNC: 95 MG/DL (ref 74–99)
HCT VFR BLD AUTO: 23.6 % (ref 35–45)
HGB BLD-MCNC: 7.2 G/DL (ref 12–16)
IRON SATN MFR SERPL: 9 % (ref 20–50)
IRON SERPL-MCNC: 29 UG/DL (ref 50–175)
MCH RBC QN AUTO: 25.4 PG (ref 24–34)
MCHC RBC AUTO-ENTMCNC: 30.5 G/DL (ref 31–37)
MCV RBC AUTO: 83.1 FL (ref 78–100)
NRBC # BLD: 0 K/UL (ref 0–0.01)
NRBC BLD-RTO: 0 PER 100 WBC
PLATELET # BLD AUTO: 412 K/UL (ref 135–420)
PMV BLD AUTO: 9.4 FL (ref 9.2–11.8)
POTASSIUM SERPL-SCNC: 5.2 MMOL/L (ref 3.5–5.5)
PTH-INTACT SERPL-MCNC: 48.7 PG/ML (ref 18.4–88)
RBC # BLD AUTO: 2.84 M/UL (ref 4.2–5.3)
RETICS/RBC NFR AUTO: 3.2 % (ref 0.5–2.5)
SODIUM SERPL-SCNC: 142 MMOL/L (ref 136–145)
TIBC SERPL-MCNC: 318 UG/DL (ref 250–450)
VIT B12 SERPL-MCNC: 308 PG/ML (ref 211–911)
WBC # BLD AUTO: 4.8 K/UL (ref 4.6–13.2)

## 2023-01-31 PROCEDURE — 82728 ASSAY OF FERRITIN: CPT

## 2023-01-31 PROCEDURE — 83970 ASSAY OF PARATHORMONE: CPT

## 2023-01-31 PROCEDURE — 97530 THERAPEUTIC ACTIVITIES: CPT

## 2023-01-31 PROCEDURE — 74011250636 HC RX REV CODE- 250/636: Performed by: FAMILY MEDICINE

## 2023-01-31 PROCEDURE — 97161 PT EVAL LOW COMPLEX 20 MIN: CPT

## 2023-01-31 PROCEDURE — 74011250637 HC RX REV CODE- 250/637: Performed by: FAMILY MEDICINE

## 2023-01-31 PROCEDURE — 82607 VITAMIN B-12: CPT

## 2023-01-31 PROCEDURE — 83540 ASSAY OF IRON: CPT

## 2023-01-31 PROCEDURE — 97165 OT EVAL LOW COMPLEX 30 MIN: CPT

## 2023-01-31 PROCEDURE — 65270000029 HC RM PRIVATE

## 2023-01-31 PROCEDURE — 80048 BASIC METABOLIC PNL TOTAL CA: CPT

## 2023-01-31 PROCEDURE — 36415 COLL VENOUS BLD VENIPUNCTURE: CPT

## 2023-01-31 PROCEDURE — 85027 COMPLETE CBC AUTOMATED: CPT

## 2023-01-31 PROCEDURE — 85045 AUTOMATED RETICULOCYTE COUNT: CPT

## 2023-01-31 PROCEDURE — 74011250636 HC RX REV CODE- 250/636: Performed by: STUDENT IN AN ORGANIZED HEALTH CARE EDUCATION/TRAINING PROGRAM

## 2023-01-31 PROCEDURE — 74011000250 HC RX REV CODE- 250: Performed by: FAMILY MEDICINE

## 2023-01-31 PROCEDURE — 74011250637 HC RX REV CODE- 250/637: Performed by: STUDENT IN AN ORGANIZED HEALTH CARE EDUCATION/TRAINING PROGRAM

## 2023-01-31 RX ORDER — SODIUM BICARBONATE 650 MG/1
1300 TABLET ORAL 2 TIMES DAILY
Status: DISCONTINUED | OUTPATIENT
Start: 2023-01-31 | End: 2023-02-02

## 2023-01-31 RX ORDER — SODIUM CHLORIDE 0.9 % (FLUSH) 0.9 %
5-40 SYRINGE (ML) INJECTION EVERY 8 HOURS
Status: DISCONTINUED | OUTPATIENT
Start: 2023-01-31 | End: 2023-02-02 | Stop reason: HOSPADM

## 2023-01-31 RX ORDER — ONDANSETRON 4 MG/1
4 TABLET, ORALLY DISINTEGRATING ORAL
Status: DISCONTINUED | OUTPATIENT
Start: 2023-01-31 | End: 2023-02-02 | Stop reason: HOSPADM

## 2023-01-31 RX ORDER — SODIUM CHLORIDE 0.9 % (FLUSH) 0.9 %
5-40 SYRINGE (ML) INJECTION AS NEEDED
Status: DISCONTINUED | OUTPATIENT
Start: 2023-01-31 | End: 2023-02-02 | Stop reason: HOSPADM

## 2023-01-31 RX ORDER — ACETAMINOPHEN 325 MG/1
650 TABLET ORAL
Status: DISCONTINUED | OUTPATIENT
Start: 2023-01-31 | End: 2023-02-02 | Stop reason: HOSPADM

## 2023-01-31 RX ORDER — HEPARIN SODIUM 5000 [USP'U]/ML
5000 INJECTION, SOLUTION INTRAVENOUS; SUBCUTANEOUS EVERY 8 HOURS
Status: DISCONTINUED | OUTPATIENT
Start: 2023-01-31 | End: 2023-02-02 | Stop reason: HOSPADM

## 2023-01-31 RX ORDER — ONDANSETRON 2 MG/ML
4 INJECTION INTRAMUSCULAR; INTRAVENOUS
Status: DISCONTINUED | OUTPATIENT
Start: 2023-01-31 | End: 2023-02-02 | Stop reason: HOSPADM

## 2023-01-31 RX ORDER — FAMOTIDINE 20 MG/1
20 TABLET, FILM COATED ORAL DAILY
Status: DISCONTINUED | OUTPATIENT
Start: 2023-02-01 | End: 2023-02-02 | Stop reason: HOSPADM

## 2023-01-31 RX ORDER — POLYETHYLENE GLYCOL 3350 17 G/17G
17 POWDER, FOR SOLUTION ORAL DAILY PRN
Status: DISCONTINUED | OUTPATIENT
Start: 2023-01-31 | End: 2023-02-02 | Stop reason: HOSPADM

## 2023-01-31 RX ORDER — ACETAMINOPHEN 650 MG/1
650 SUPPOSITORY RECTAL
Status: DISCONTINUED | OUTPATIENT
Start: 2023-01-31 | End: 2023-02-02 | Stop reason: HOSPADM

## 2023-01-31 RX ORDER — FAMOTIDINE 20 MG/1
20 TABLET, FILM COATED ORAL 2 TIMES DAILY
Status: DISCONTINUED | OUTPATIENT
Start: 2023-01-31 | End: 2023-01-31 | Stop reason: DRUGHIGH

## 2023-01-31 RX ADMIN — IRON SUCROSE 200 MG: 20 INJECTION, SOLUTION INTRAVENOUS at 14:00

## 2023-01-31 RX ADMIN — FAMOTIDINE 20 MG: 20 TABLET, FILM COATED ORAL at 08:30

## 2023-01-31 RX ADMIN — SODIUM BICARBONATE 1300 MG: 650 TABLET ORAL at 14:01

## 2023-01-31 RX ADMIN — SODIUM CHLORIDE, PRESERVATIVE FREE 10 ML: 5 INJECTION INTRAVENOUS at 06:11

## 2023-01-31 RX ADMIN — HEPARIN SODIUM 5000 UNITS: 5000 INJECTION INTRAVENOUS; SUBCUTANEOUS at 21:53

## 2023-01-31 RX ADMIN — HEPARIN SODIUM 5000 UNITS: 5000 INJECTION INTRAVENOUS; SUBCUTANEOUS at 06:11

## 2023-01-31 RX ADMIN — SODIUM CHLORIDE, PRESERVATIVE FREE 10 ML: 5 INJECTION INTRAVENOUS at 14:01

## 2023-01-31 RX ADMIN — SODIUM BICARBONATE 1300 MG: 650 TABLET ORAL at 21:53

## 2023-01-31 RX ADMIN — SODIUM CHLORIDE, PRESERVATIVE FREE 10 ML: 5 INJECTION INTRAVENOUS at 21:54

## 2023-01-31 RX ADMIN — HEPARIN SODIUM 5000 UNITS: 5000 INJECTION INTRAVENOUS; SUBCUTANEOUS at 14:00

## 2023-01-31 NOTE — PROGRESS NOTES
Spoke with patient and family at the bedside in regards to home meds. They were concerned about the patient not receiving he BP meds. Reviewed home meds with patient and family. Paged Dr. Deann Block at [phone] to ask about the patient meds being ordered. No call received. Harry Florian RN spoke with him shortly after in the [place] and he stated that he would review the meds.

## 2023-01-31 NOTE — PROGRESS NOTES
Pharmacy Renal Dosing Service:       Famotidine was automatically dose-adjusted per [facility] P[facility]T Committee Protocol, with respect to renal function. Consult provided for this   69 y.o. , female , for the indication of SUP. Dose adjusted to: Famotidine 20 mg PO daily     Pt Weight:   Wt Readings from Last 1 Encounters:   01/30/23 55.3 kg (122 lb)       Previous Regimen   Famotidine 20 mg po twice daily    Serum Creatinine Lab Results   Component Value Date/Time    Creatinine 1.82 (H) 01/31/2023 02:45 AM       Creatinine Clearance Estimated Creatinine Clearance: 22.5 mL/min (A) (based on SCr of 1.82 mg/dL (H)). BUN Lab Results   Component Value Date/Time    BUN 39 (H) 01/31/2023 02:45 AM         Pharmacy to continue to monitor patient daily. Will make dosage adjustments based upon changing renal function.   Signed Bryson Torres information: [phone]

## 2023-01-31 NOTE — ED TRIAGE NOTES
Pt arrived to ED as a referral from her PCP. Pt states the doctor wanted her seen in the ED for anemia and elevated kidney function. Pt is alert and oriented x4. Vital signs are stable.

## 2023-01-31 NOTE — PROGRESS NOTES
Problem: Falls - Risk of  Goal: *Absence of Falls  Description: Document Kuldeep Medina Fall Risk and appropriate interventions in the flowsheet.   Outcome: Progressing Towards Goal  Note: Fall Risk Interventions:                                Problem: Patient Education: Go to Patient Education Activity  Goal: Patient/Family Education  Outcome: Progressing Towards Goal     Problem: Anemia Care Plan (Adult and Pediatric)  Goal: *Labs within defined limits  Outcome: Progressing Towards Goal  Goal: *Tolerates increased activity  Outcome: Progressing Towards Goal     Problem: Patient Education: Go to Patient Education Activity  Goal: Patient/Family Education  Outcome: Progressing Towards Goal

## 2023-01-31 NOTE — CONSULTS
RENAL CONSULT  2023    Patient:  Chencho Jordan  :  1955  Gender:  female  MRN #:  [id number]    Reason for Consult: QUINN on CKD and anemia     History of Present Illness:    Chencho Jordan is a 69y.o. year old female was sent by PCP as latest lab work demonstrated anemia and elevated creatinine   Reportedly she had hemoglobin of 6.9 mg/dl and creatinine 2.2 mg/dl in outpatient labs   She has fatigue and generalized weakness . ED work up showed : creatinine 1.98 mg/dl    Hemoglobin 7.4 gram/dl   She is [place] Islander speaking , she did not want me to use official [place]er , prefers her friends to translate for me. Says she is better that yesterday but still has generalized weakness . No shortness of breath and chest pain. Denied blood in stool or urine . Was not taking NSIADS      Past Medical History:   Diagnosis Date    Arrhythmia     \"my heart beats fast\" \" I am awaiting results from Cardiologist\"    Hypertension     Psychiatric disorder     anxiety     No past surgical history on file. No family history on file.   No Known Allergies  Current Facility-Administered Medications   Medication Dose Route Frequency Provider Last Rate Last Admin    sodium chloride (NS) flush 5-40 mL  5-40 mL IntraVENous Q8H Aman Giles MD   10 mL at 23 1569    sodium chloride (NS) flush 5-40 mL  5-40 mL IntraVENous PRN Sanjay Giles MD        acetaminophen (TYLENOL) tablet 650 mg  650 mg Oral Q6H PRN Sanjay Giles MD        Or    acetaminophen (TYLENOL) suppository 650 mg  650 mg Rectal Q6H PRN Aman Giles MD        polyethylene glycol (MIRALAX) packet 17 g  17 g Oral DAILY PRN Aman Giles MD        ondansetron (ZOFRAN ODT) tablet 4 mg  4 mg Oral Q8H PRN Aman Giles MD        Or    ondansetron (ZOFRAN) injection 4 mg  4 mg IntraVENous Q6H PRN Aman Giles MD        famotidine (PEPCID) tablet 20 mg  20 mg Oral BID Pattie Giles MD   20 mg at 01/31/23 0830    heparin (porcine) injection 5,000 Units  5,000 Units SubCUTAneous Q8H Pattie Giles MD   5,000 Units at 01/31/23 3546     Facility-Administered Medications Ordered in Other Encounters   Medication Dose Route Frequency Provider Last Rate Last Admin    esmolol (BREVIBLOC) 100 mg/10 mL (10 mg/mL) injection   IntraVENous PRN More Pummel V, CRNA   10 mg at 09/19/18 1516    fentaNYL citrate (PF) injection   IntraVENous PRN Babak Brown CRNA   25 mcg at 09/19/18 1518    midazolam (VERSED) injection   IntraVENous PRN More Pummel V, CRNA   1 mg at 09/19/18 1508    lidocaine (PF) (XYLOCAINE) 20 mg/mL (2 %) injection   IntraVENous PRN More Pummel V, CRNA   40 mg at 09/19/18 1528    rocuronium (ZEMURON) injection   IntraVENous PRN More Pummel V, CRNA   10 mg at 09/19/18 1552    PHENYLephrine (KELSEY-SYNEPHRINE) 10,000 mcg in 0.9% sodium chloride 100 mL infusion  10,000 mcg IntraVENous CONTINUOUS Rolley Bi, [place] V, CRNA   200 mcg at 09/19/18 1546    glucagon (GLUCAGEN) injection   IntraVENous PRN Tanna Kamara MD   1 mg at 09/19/18 1634    ondansetron (ZOFRAN) injection    PRN Tanna Kamara MD   4 mg at 09/19/18 1615    lactated Ringers infusion   IntraVENous CONTINUOUS Royanne Snipe, DO   New Bag at 09/19/18 1636    glycopyrrolate (ROBINUL) injection   IntraVENous PRN Royanne Snipe, DO   0.4 mg at 09/19/18 1641    ketorolac (TORADOL) injection   IntraVENous PRN Royanne Snipe, DO   15 mg at 09/19/18 1645           Review of Symptoms:     Consitutional Symptoms: No fever, weight loss, weight gain and fatigue  Eyes:- No change in vision , no itching   Ears, Nose, Mouth, Throat:  No neck pain , swelling ,hearing loss and nose bleed. Pulmonary: No cough and shortness of breath .   CVS: No  Chest pain , palpitation and orthopnea  GI: No nausea, vomiting, abdominal pain, and blood in stool   - No burning, frequency ,urgency  and difficulty voiding . Neurological:No dizzy ness, syncope, focal weakness and numbness . Skin : No rash and erythema   Endocrine: No feeling of excessive cold or warmth, hot flushes  Psychiatric: Denied feeling depressed    Objective:    Visit Vitals  /82   Pulse 65   Temp 97.6 °F (36.4 °C)   Resp 17   Ht 4' 11\" (1.499 m)   Wt 55.3 kg (122 lb)   SpO2 99%   Breastfeeding No   BMI 24.64 kg/m²       Physical Exam:    Pt awake,  alert and comfortable  HEENT: No JVD, anicteric sclera, no neck swelling  Lung: clear to auscultation, no crackles and wheeze  Heart: s1s2 regualr no rubs or murmur  Abdomen: soft, non tender, no guarding, normal bowel sounds.   Ext: no edema and pulsation intact  Skin: No rash and erythema  CNS- Oriented to time , place and person     Laboratory Data:    Lab Results   Component Value Date    BUN 39 (H) 01/31/2023    BUN 45 (H) 01/30/2023    BUN 42 (H) 01/02/2023     01/31/2023     01/30/2023     01/02/2023    CO2 17 (L) 01/31/2023    CO2 17 (L) 01/30/2023    CO2 19 (L) 01/02/2023     Lab Results   Component Value Date    WBC 4.8 01/31/2023    HGB 7.2 (L) 01/31/2023    HCT 23.6 (L) 01/31/2023     No components found for: CALCIUM, PHOSPHORUS, MAGNESIUM  Lab Results   Component Value Date    HDL 14 (L) 09/19/2018     No results found for: SPECIMENTYP, TURBIDITY, UGLU    Imaging Reveiwed:    CT abdomen: KIDNEYS/URETERS: No mass, calculus, or hydronephrosis  Uterine mass    Assessment:      CKD stage 3GA/3G - creatinine was 2.06 mg/dl on 1/2/23  and it was 2.8 mg/dl on 1/26/23   which was 1.98 mg/dl on admission, in absence of blood work up in 2022 , it is unable to predict if this is QUINN , as creatinine is improving compared to 4 weeks ago  and 4 days prior , most likely this represent CKD with fluctuation in GFR/creatinine due to hemodynamics and anemia      -Anemia  -Hypertension   -Metabolic acidosis   -Hyperkalemia   - Pelvis mass     Plan:     - encourage oral water intake, no need for volume expansion as creatinine is trending down  Urine electrolytes ,UPC, PTH   Strict intake and output recording , especially urine output   Ensure that patient does not retain urine   Bladder scan daily prn    Avoid NSAIDS, contrast , nephrotoxin   Dose all medicine for current eGFR   No clinical and metabolic indication of dialysis     Hypertension: stable    Anemia- low ferritin  Will give iv venofer first   Needs further work up for pelvic mass, consult Gyn   Transfusion prn to keep hemoglobin above 7 gram/dl     Metabolic Acidosis- start sodium bicarbonate 1300 mg BID     Bone mineral disease: reviewed  Check phos and Roney Teixeira MD, MD  [facility].- Nephrology

## 2023-01-31 NOTE — ED PROVIDER NOTES
[facility] EMERGENCY DEPT  EMERGENCY DEPARTMENT ENCOUNTER       Pt Name: Ashwin Larose  MRN: [id number]  [place] 1955  Date of evaluation: 1/30/2023  Provider: BELA Flaherty   PCP: Brennon Ridley MD  Note Started: 7:20 PM 1/30/23     CHIEF COMPLAINT       Chief Complaint   Patient presents with    Abnormal Lab Results        HISTORY OF PRESENT ILLNESS: 1 or more elements      History From: Patient  HPI Limitations : Patient speaks Finnish, daughter here as  as they declined a      Ashwin Larose is a 69 y.o. female with past medical history of SVT, hypertension, who presents to ED c/o evaluation for anemia and abnormal kidney function. Patient was seen at her PCP office a couple days ago and was called and sent to the ER due to low hemoglobin of 6.9 and abnormal creatinine of 2.8. She also had normal iron studies at that time and was referred to nephrology with first appointment with Dr. Edd Pringle this Thursday 2/2/23. Currently complains of generalized fatigue and intermittent right lower back pain. She denies any chest pain, shortness of breath, abdominal pain, vomiting, bloody or black stools, fever. Past surgical history of laparoscopic cholecystectomy in August 2021     Nursing Notes were all reviewed and agreed with or any disagreements were addressed in the HPI. REVIEW OF SYSTEMS     Positives and Pertinent negatives as per HPI. PAST HISTORY     Past Medical History:  Past Medical History:   Diagnosis Date    Arrhythmia     \"my heart beats fast\" \" I am awaiting results from Cardiologist\"    Hypertension     Psychiatric disorder     anxiety       Past Surgical History:  No past surgical history on file. Family History:  No family history on file.     Social History:  Social History     Tobacco Use    Smoking status: Never    Smokeless tobacco: Never   Vaping Use    Vaping Use: Never used   Substance Use Topics    Alcohol use: No    Drug use: Never Allergies:  No Known Allergies    CURRENT MEDICATIONS      Previous Medications    AMLODIPINE (NORVASC) 10 MG TABLET    Take 1 Tab by mouth daily. ATENOLOL (TENORMIN) 100 MG TABLET    Take 1 Tab by mouth two (2) times a day. ATORVASTATIN (LIPITOR) 40 MG TABLET    Take 40 mg by mouth nightly. AZITHROMYCIN (ZITHROMAX Z-JARED) 250 MG TABLET    Take 1st dose 24 hours after initial dose that was given in the ER. Then take 1 tablet daily until finished. HYDRALAZINE (APRESOLINE) 25 MG TABLET    Take 1 Tab by mouth three (3) times daily. LORAZEPAM (ATIVAN) 1 MG TABLET    Take 1 mg by mouth every four (4) hours as needed for Anxiety. LOSARTAN-HYDROCHLOROTHIAZIDE (HYZAAR) 100-25 MG PER TABLET    Take 1 Tab by mouth daily. OMEPRAZOLE (PRILOSEC) 40 MG CAPSULE    Take 40 mg by mouth daily. ONDANSETRON (ZOFRAN ODT) 8 MG DISINTEGRATING TABLET    Take 1 Tablet by mouth every eight (8) hours as needed for Nausea or Vomiting. Indications: prevent nausea and vomiting after surgery    SPIRONOLACTONE (ALDACTONE) 25 MG TABLET    Take 25 mg by mouth daily. SCREENINGS               No data recorded         PHYSICAL EXAM      ED Triage Vitals   ED Encounter Vitals Group      BP 01/30/23 1904 (!) 134/53      Pulse (Heart Rate) 01/30/23 1903 61      Resp Rate 01/30/23 1903 16      Temp 01/30/23 1903 97.3 °F (36.3 °C)      Temp src --       O2 Sat (%) 01/30/23 1903 98 %      Weight 01/30/23 1903 122 lb      Height 01/30/23 1903 4' 11\"        Physical Exam  Vital signs and nursing notes reviewed. CONSTITUTIONAL: Alert. Well-appearing; well-nourished; in no apparent distress. HEAD: Normocephalic; atraumatic. EYES: Conjunctiva clear. CV: Normal S1, S2; no murmurs, rubs, or gallops. No chest wall tenderness. RESPIRATORY: Normal chest excursion with respiration; breath sounds clear and equal bilaterally; no wheezes, rhonchi, or rales.   GI: Normal bowel sounds; non-distended; non-tender; no guarding or rigidity; no palpable organomegaly. No CVA tenderness. SKIN: Normal for age and race; warm; dry; good turgor; no apparent lesions or exudate. NEURO: A & O x3. Cranial nerves 2-12 intact. Motor 5/5 bilaterally. Sensation intact. PSYCH:  Mood and affect appropriate. DIAGNOSTIC RESULTS   LABS:     Recent Results (from the past 12 hour(s))   EKG, 12 LEAD, INITIAL    Collection Time: 01/30/23  7:11 PM   Result Value Ref Range    Ventricular Rate 71 BPM    Atrial Rate 71 BPM    P-R Interval 170 ms    QRS Duration 90 ms    Q-T Interval 390 ms    QTC Calculation (Bezet) 423 ms    Calculated P Axis 47 degrees    Calculated R Axis 99 degrees    Calculated T Axis 50 degrees    Diagnosis       Normal sinus rhythm  Rightward axis  RSR' or QR pattern in V1 suggests right ventricular conduction delay  Borderline ECG  When compared with ECG of 02-JAN-2023 18:37,  No significant change was found     CBC WITH AUTOMATED DIFF    Collection Time: 01/30/23  7:22 PM   Result Value Ref Range    WBC 6.1 4.6 - 13.2 K/uL    RBC 2.90 (L) 4.20 - 5.30 M/uL    HGB 7.4 (L) 12.0 - 16.0 g/dL    HCT 24.3 (L) 35.0 - 45.0 %    MCV 83.8 78.0 - 100.0 FL    MCH 25.5 24.0 - 34.0 PG    MCHC 30.5 (L) 31.0 - 37.0 g/dL    RDW 16.4 (H) 11.6 - 14.5 %    PLATELET 685 (H) 540 - 420 K/uL    MPV 9.3 9.2 - 11.8 FL    NRBC 0.0 0  WBC    ABSOLUTE NRBC 0.00 0.00 - 0.01 K/uL    NEUTROPHILS 85 (H) 40 - 73 %    LYMPHOCYTES 10 (L) 21 - 52 %    MONOCYTES 4 3 - 10 %    EOSINOPHILS 0 0 - 5 %    BASOPHILS 0 0 - 2 %    IMMATURE GRANULOCYTES 1 (H) 0.0 - 0.5 %    ABS. NEUTROPHILS 5.2 1.8 - 8.0 K/UL    ABS. LYMPHOCYTES 0.6 (L) 0.9 - 3.6 K/UL    ABS. MONOCYTES 0.2 0.05 - 1.2 K/UL    ABS. EOSINOPHILS 0.0 0.0 - 0.4 K/UL    ABS. BASOPHILS 0.0 0.0 - 0.1 K/UL    ABS. IMM.  GRANS. 0.1 (H) 0.00 - 0.04 K/UL    DF AUTOMATED     METABOLIC PANEL, COMPREHENSIVE    Collection Time: 01/30/23  7:22 PM   Result Value Ref Range    Sodium 136 136 - 145 mmol/L    Potassium 5.9 (H) 3.5 - 5.5 mmol/L    Chloride 111 100 - 111 mmol/L    CO2 17 (L) 21 - 32 mmol/L    Anion gap 8 3.0 - 18 mmol/L    Glucose 260 (H) 74 - 99 mg/dL    BUN 45 (H) 7.0 - 18 MG/DL    Creatinine 1.98 (H) 0.6 - 1.3 MG/DL    BUN/Creatinine ratio 23 (H) 12 - 20      eGFR 27 (L) >60 ml/min/1.73m2    Calcium 9.4 8.5 - 10.1 MG/DL    Bilirubin, total 0.3 0.2 - 1.0 MG/DL    ALT (SGPT) 15 13 - 56 U/L    AST (SGOT) 11 10 - 38 U/L    Alk. phosphatase 84 45 - 117 U/L    Protein, total 8.1 6.4 - 8.2 g/dL    Albumin 3.5 3.4 - 5.0 g/dL    Globulin 4.6 (H) 2.0 - 4.0 g/dL    A-G Ratio 0.8 0.8 - 1.7     TYPE & SCREEN    Collection Time: 01/30/23  7:22 PM   Result Value Ref Range    Crossmatch Expiration 02/02/2023,2359     ABO/Rh(D) A POSITIVE     Antibody screen NEG    TROPONIN-HIGH SENSITIVITY    Collection Time: 01/30/23  7:22 PM   Result Value Ref Range    Troponin-High Sensitivity 24 0 - 54 ng/L   OCCULT BLOOD, STOOL    Collection Time: 01/30/23  9:21 PM   Result Value Ref Range    Occult blood, stool Negative NEG          EKG: When ordered, EKG's are interpreted by the Emergency Department Provider in the absence of a cardiologist.  Please see their note for interpretation of EKG. 19 11 PM  Normal sinus rhythm, rate 71, RBBB, no STEMI or change from previous     RADIOLOGY:  Non-plain film images such as CT, Ultrasound and MRI are read by the radiologist. Plain radiographic images are visualized and preliminarily interpreted by the ED Provider with the below findings:        Interpretation per the Radiologist below, if available at the time of this note:     CT ABD PELV WO CONT    Result Date: 1/30/2023  INDICATION: Acute kidney injury and anemia. Right lower back pain COMPARISON: 10/7/2019 TECHNIQUE: Thin axial images were obtained through the abdomen and pelvis. Coronal and sagittal reconstructions were generated. Oral contrast was not administered.  CT dose reduction was achieved through use of a standardized protocol tailored for this examination and automatic exposure control for dose modulation. The absence of intravenous contrast material reduces the sensitivity for evaluation of the solid parenchymal organs of the abdomen. FINDINGS: LUNG BASES: Clear. INCIDENTALLY IMAGED HEART AND MEDIASTINUM: Small hiatal hernia LIVER: No mass or fatty infiltration. GALLBLADDER: Surgically absent. Pneumobilia. Question previous sphincterotomy SPLEEN: Not enlarged. PANCREAS: Not enlarged or inflamed. ADRENALS: 9 mm left adrenal nodule unchanged. Right adrenal gland is not enlarged KIDNEYS/URETERS: No mass, calculus, or hydronephrosis. STOMACH: Unremarkable. BOWEL: No dilatation or wall thickening. APPENDIX: Not identified PERITONEUM: No ascites or pneumoperitoneum. RETROPERITONEUM: No lymphadenopathy or aortic aneurysm. REPRODUCTIVE ORGANS: 3.1 x 1.3 x 1.2 cm fatty lesion within the posterior uterus. This is unchanged. At the dome of the bladder is 9.3 cm uniform hypodense mass. Uncertain if this is uterine or ovarian in origin. It previously measured 7.5 cm URINARY BLADDER: No mass or calculus. BONES: No destructive bone lesion. ADDITIONAL COMMENTS: N/A     1. Round low-density mass within the pelvis has increased in size slightly now measuring 9.3 cm. Uncertain if this is ovarian or uterine origin. Low-grade neoplasm, physiologic cyst or endometrioma could have this appearance.  Nonemergent follow-up with GYN is recommended       PROCEDURES   Unless otherwise noted below, none  Procedures     CRITICAL CARE TIME   none    EMERGENCY DEPARTMENT COURSE and DIFFERENTIAL DIAGNOSIS/MDM   Vitals:    Vitals:    01/30/23 1903 01/30/23 1904 01/30/23 1929 01/30/23 2002   BP:  (!) 134/53 135/61 (!) 129/49   Pulse: 61  71 67   Resp: 16      Temp: 97.3 °F (36.3 °C)      SpO2: 98%  100% 99%   Weight: 55.3 kg (122 lb)      Height: 4' 11\" (1.499 m)           Patient was given the following medications:  Medications   sodium polystyrene (KAYEXALATE) 15 gram/60 mL oral suspension 30 g (has no administration in time range)       CONSULTS: (Who and What was discussed)  IP CONSULT TO HOSPITALIST  IP CONSULT TO NEPHROLOGY    Chronic Conditions: HTN    Social Determinants affecting Dx or Tx: None    Records Reviewed (source and summary): None    ED Course  ED Course as of 01/30/23 2254   Mon Jan 30, 2023 2250 Case discussed with nephrologist on-call Dr. Theresa Goodell, who will consult. [MD]      ED Course User Index  [MD] BELA Oconnor   10:45 PM consult note  Case discussed with hospitalist Dr. Navarro Jurado, who will admit to medical floor. Medical Decision Making/Disposition Considerations: 68-year-old male with history of hypertension presents with fatigue, worse with exertion and outpatient labs with hemoglobin of 6.9. Creatinine 2.2. She has no other complaints and some mild intermittent low back pain. She is hemodynamically stable and otherwise well-appearing, similar H&H and creatinine here but no signs of acute bleeding and her fecal Hemoccult test was negative. Hemoglobin here is 7.2 so we will hold off on transfusion at this time. CT abdomen pelvis shows no acute abnormalities but she does have a pelvic mass which will require further GYN evaluation. Nephrology consulted for evaluation of her acute on chronic kidney injury. Admitted to hospitalist for further work-up and management. FINAL IMPRESSION     1. Symptomatic anemia    2. QUINN (acute kidney injury) ([facility] [address]    3. Pelvic mass          DISPOSITION/PLAN   Admitted    Admit Note: Pt is being admitted by Dr. Navarro Jurado. The results of their tests and reason(s) for their admission have been discussed with pt and/or available family. They convey agreement and understanding for the need to be admitted and for the admission diagnosis. I am the Primary Clinician of Record. (Please note that parts of this dictation were completed with voice recognition software.  Quite often unanticipated grammatical, syntax, homophones, and other interpretive errors are inadvertently transcribed by the computer software. Please disregards these errors.  Please excuse any errors that have escaped final proofreading.)

## 2023-01-31 NOTE — ED NOTES
Pt denies any SOB or dizziness at this time. Lab results reviewed from primary office. Provider notified of results. Repeat blood work sent to lab.

## 2023-01-31 NOTE — H&P
History & Physical    Patient: Pancho Thomas MRN: [id number]  CSN: [id number]    YOB: 1955  Age: 69 y.o. Sex: female      DOA: 1/30/2023  Primary Care Provider:  Moira Raygoza MD      Assessment/Plan   Pancho Thomas is a 69 y.o. female with history of hypertension presents with fatigue that is worse with exertion and generalized malaise and sent to ED by PCP because of abnormal outpatient lab. Admitted for symptomatic anemia, QUINN and pelvic mass.      Symptomatic anemia -  Check iron panel, b12 and folate   Trend CBC  Transfuse if Hgb <7  Has associated pelvic mass     QUINN -  Serum creatinine on admission 1.98 and potassium slightly elevated at 5.9  Repeat lab work shows that potassium improved after intervention in ED  Emergency department team consulted nephrology, Dr. Young Ro aware  Defer management and evaluation to Nephrology   Avoid nephrotoxic agents and renally dose medications as appropriate  BMP daily    Pelvic mass-  Gynecology oncology service not available at [facility]   patient will need to follow-up as outpatient    Hypertension-  Monitor blood pressure  Resume home medications as appropriate    Cardiac arrhythmia -  We will add remote telemetry  Resume home medications as appropriate    DVT and GI prophylaxis ordered      Patient Active Problem List   Diagnosis Code    Choledocholithiasis K80.50    Hypomagnesemia E83.42    UTI (urinary tract infection) N39.0    Head injury S09.90XA    Abdominal pain, epigastric R10.13    Leukocytosis D72.829    Tachyarrhythmia R00.0    Feeling anxious R45.89    Accelerated hypertension I10    Nausea and vomiting R11.2    Arrhythmia I49.9    Symptomatic anemia D64.9    QUINN (acute kidney injury) ([facility] [address] N17.9    Mass of pelvis R19.00    HTN (hypertension) I10     Estimated length of stay : 3-4 days     CC: Malaise, fatigue and PRUITT          HPI:     Pancho Thomas is a 69 y.o. female with history of hypertension presents with fatigue that is worse with exertion and generalized malaise and sent to ED by PCP because of abnormal outpatient lab. At PCPs office labwork obtained showed a hemoglobin of 6.9 and creatinine of 2.2. Denies blood in stools or urine and denies passing black tarry stools. Fecal hemoccult test in the emergency room was negative. Hemoglobin in the emergency room today was 7.4 and her creatinine is 1.98.  CT abdomen and pelvis was obtained in the emergency room and it showed a pelvic mass. This mass was present in previous CT abd in 10/2019 but was smaller. Past Medical History:   Diagnosis Date    Arrhythmia     \"my heart beats fast\" \" I am awaiting results from Cardiologist\"    Hypertension     Psychiatric disorder     anxiety       No past surgical history on file. No family history on file. Social History     Socioeconomic History    Marital status:    Tobacco Use    Smoking status: Never    Smokeless tobacco: Never   Vaping Use    Vaping Use: Never used   Substance and Sexual Activity    Alcohol use: No    Drug use: Never       Prior to Admission medications    Medication Sig Start Date End Date Taking? Authorizing Provider   ondansetron (ZOFRAN ODT) 8 mg disintegrating tablet Take 1 Tablet by mouth every eight (8) hours as needed for Nausea or Vomiting. Indications: prevent nausea and vomiting after surgery 8/26/21  Yes Kevin Hart MD   LORazepam (ATIVAN) 1 mg tablet Take 1 mg by mouth every four (4) hours as needed for Anxiety. Yes Provider, Historical   atorvastatin (LIPITOR) 40 mg tablet Take 40 mg by mouth nightly. Yes Provider, Historical   losartan-hydroCHLOROthiazide (HYZAAR) 100-25 mg per tablet Take 1 Tab by mouth daily. Yes Provider, Historical   hydrALAZINE (APRESOLINE) 25 mg tablet Take 1 Tab by mouth three (3) times daily. Patient taking differently: Take 100 mg by mouth three (3) times daily.  9/23/18  Yes Nigel Quintanilla MD   atenolol (TENORMIN) 100 mg tablet Take 1 Tab by mouth two (2) times a day. 18  Yes Renetta Quintanilla MD   amLODIPine (NORVASC) 10 mg tablet Take 1 Tab by mouth daily. Patient taking differently: Take 10 mg by mouth nightly. 18  Yes Renetta Quintanilla MD   azithromycin (Zithromax Z-Reyes) 250 mg tablet Take 1st dose 24 hours after initial dose that was given in the ER. Then take 1 tablet daily until finished. Patient not taking: Reported on 2023 1/3/23   Carlo Iniguez MD   omeprazole (PRILOSEC) 40 mg capsule Take 40 mg by mouth daily. Patient not taking: Reported on 2023    Provider, Historical   spironolactone (ALDACTONE) 25 mg tablet Take 25 mg by mouth daily. Patient not taking: Reported on 2023    Provider, Historical       No Known Allergies    Review of Systems  Gen: No fever, chills, malaise, weight loss/gain. Heent: No headache, rhinorrhea, epistaxis, ear pain, hearing loss, sinus pain, neck pain/stiffness, sore throat. Heart: No chest pain, palpitations, PRUITT, pnd, or orthopnea. Resp: No cough, hemoptysis, wheezing and shortness of breath. GI: No nausea, vomiting, diarrhea, constipation, melena or hematochezia. : No urinary obstruction, dysuria or hematuria. Derm: No rash, new skin lesion or pruritis. Musc/skeletal: no bone or joint complains. Vasc: No edema, cyanosis or claudication. Endo: No heat/cold intolerance, no polyuria,polydipsia or polyphagia. Neuro: No unilateral weakness, numbness, tingling. No seizures. Heme: No easy bruising or bleeding. Physical Exam:     Physical Exam:  Visit Vitals  BP (!) 118/38   Pulse 68   Temp 98.6 °F (37 °C)   Resp 16   Ht 4' 11\" (1.499 m)   Wt 55.3 kg (122 lb)   SpO2 100%   Breastfeeding No   BMI 24.64 kg/m²      O2 Device: None (Room air)    Temp (24hrs), Av.2 °F (36.8 °C), Min:97.3 °F (36.3 °C), Max:98.7 °F (37.1 °C)    No intake/output data recorded. No intake/output data recorded. General:  Awake, cooperative, no distress.    Head: Normocephalic, without obvious abnormality, atraumatic. Eyes:  Conjunctivae/corneas clear, sclera anicteric, PERRL, EOMs intact. Nose: Nares normal. No drainage or sinus tenderness. Throat: Lips, mucosa, and tongue normal.    Neck: Supple, symmetrical, trachea midline, no adenopathy. Lungs:   Clear to auscultation bilaterally. Heart:  Regular rate and rhythm, S1, S2 normal, no murmur, click, rub or gallop. Abdomen: Soft, non-tender. Bowel sounds normal. No masses,  No organomegaly. Extremities: Extremities normal, atraumatic, no cyanosis or edema. Capillary refill normal.   Pulses: 2+ and symmetric all extremities. Skin: Skin color pink/pale/mottled/flushed, turgor normal. No rashes or lesions   Neurologic: CNII-XII intact. No focal motor or sensory deficit.        Labs Reviewed:    BMP:   Lab Results   Component Value Date/Time     01/31/2023 02:45 AM    K 5.2 01/31/2023 02:45 AM     (H) 01/31/2023 02:45 AM    CO2 17 (L) 01/31/2023 02:45 AM    AGAP 9 01/31/2023 02:45 AM    GLU 95 01/31/2023 02:45 AM    BUN 39 (H) 01/31/2023 02:45 AM    CREA 1.82 (H) 01/31/2023 02:45 AM     CMP:   Lab Results   Component Value Date/Time     01/31/2023 02:45 AM    K 5.2 01/31/2023 02:45 AM     (H) 01/31/2023 02:45 AM    CO2 17 (L) 01/31/2023 02:45 AM    AGAP 9 01/31/2023 02:45 AM    GLU 95 01/31/2023 02:45 AM    BUN 39 (H) 01/31/2023 02:45 AM    CREA 1.82 (H) 01/31/2023 02:45 AM    CA 9.7 01/31/2023 02:45 AM    ALB 3.5 01/30/2023 07:22 PM    TP 8.1 01/30/2023 07:22 PM    GLOB 4.6 (H) 01/30/2023 07:22 PM    AGRAT 0.8 01/30/2023 07:22 PM    ALT 15 01/30/2023 07:22 PM     CBC:   Lab Results   Component Value Date/Time    WBC 4.8 01/31/2023 02:45 AM    HGB 7.2 (L) 01/31/2023 02:45 AM    HCT 23.6 (L) 01/31/2023 02:45 AM     01/31/2023 02:45 AM     Recent Results (from the past 24 hour(s))   EKG, 12 LEAD, INITIAL    Collection Time: 01/30/23  7:11 PM   Result Value Ref Range Ventricular Rate 71 BPM    Atrial Rate 71 BPM    P-R Interval 170 ms    QRS Duration 90 ms    Q-T Interval 390 ms    QTC Calculation (Bezet) 423 ms    Calculated P Axis 47 degrees    Calculated R Axis 99 degrees    Calculated T Axis 50 degrees    Diagnosis       Normal sinus rhythm  Rightward axis  RSR' or QR pattern in V1 suggests right ventricular conduction delay  Borderline ECG  When compared with ECG of 02-JAN-2023 18:37,  No significant change was found     CBC WITH AUTOMATED DIFF    Collection Time: 01/30/23  7:22 PM   Result Value Ref Range    WBC 6.1 4.6 - 13.2 K/uL    RBC 2.90 (L) 4.20 - 5.30 M/uL    HGB 7.4 (L) 12.0 - 16.0 g/dL    HCT 24.3 (L) 35.0 - 45.0 %    MCV 83.8 78.0 - 100.0 FL    MCH 25.5 24.0 - 34.0 PG    MCHC 30.5 (L) 31.0 - 37.0 g/dL    RDW 16.4 (H) 11.6 - 14.5 %    PLATELET 769 (H) 741 - 420 K/uL    MPV 9.3 9.2 - 11.8 FL    NRBC 0.0 0  WBC    ABSOLUTE NRBC 0.00 0.00 - 0.01 K/uL    NEUTROPHILS 85 (H) 40 - 73 %    LYMPHOCYTES 10 (L) 21 - 52 %    MONOCYTES 4 3 - 10 %    EOSINOPHILS 0 0 - 5 %    BASOPHILS 0 0 - 2 %    IMMATURE GRANULOCYTES 1 (H) 0.0 - 0.5 %    ABS. NEUTROPHILS 5.2 1.8 - 8.0 K/UL    ABS. LYMPHOCYTES 0.6 (L) 0.9 - 3.6 K/UL    ABS. MONOCYTES 0.2 0.05 - 1.2 K/UL    ABS. EOSINOPHILS 0.0 0.0 - 0.4 K/UL    ABS. BASOPHILS 0.0 0.0 - 0.1 K/UL    ABS. IMM. GRANS. 0.1 (H) 0.00 - 0.04 K/UL    DF AUTOMATED     METABOLIC PANEL, COMPREHENSIVE    Collection Time: 01/30/23  7:22 PM   Result Value Ref Range    Sodium 136 136 - 145 mmol/L    Potassium 5.9 (H) 3.5 - 5.5 mmol/L    Chloride 111 100 - 111 mmol/L    CO2 17 (L) 21 - 32 mmol/L    Anion gap 8 3.0 - 18 mmol/L    Glucose 260 (H) 74 - 99 mg/dL    BUN 45 (H) 7.0 - 18 MG/DL    Creatinine 1.98 (H) 0.6 - 1.3 MG/DL    BUN/Creatinine ratio 23 (H) 12 - 20      eGFR 27 (L) >60 ml/min/1.73m2    Calcium 9.4 8.5 - 10.1 MG/DL    Bilirubin, total 0.3 0.2 - 1.0 MG/DL    ALT (SGPT) 15 13 - 56 U/L    AST (SGOT) 11 10 - 38 U/L    Alk.  phosphatase 84 45 - 117 U/L    Protein, total 8.1 6.4 - 8.2 g/dL    Albumin 3.5 3.4 - 5.0 g/dL    Globulin 4.6 (H) 2.0 - 4.0 g/dL    A-G Ratio 0.8 0.8 - 1.7     TYPE & SCREEN    Collection Time: 01/30/23  7:22 PM   Result Value Ref Range    Crossmatch Expiration 02/02/2023,2359     ABO/Rh(D) A POSITIVE     Antibody screen NEG    TROPONIN-HIGH SENSITIVITY    Collection Time: 01/30/23  7:22 PM   Result Value Ref Range    Troponin-High Sensitivity 24 0 - 54 ng/L   OCCULT BLOOD, STOOL    Collection Time: 01/30/23  9:21 PM   Result Value Ref Range    Occult blood, stool Negative NEG     METABOLIC PANEL, BASIC    Collection Time: 01/31/23  2:45 AM   Result Value Ref Range    Sodium 142 136 - 145 mmol/L    Potassium 5.2 3.5 - 5.5 mmol/L    Chloride 116 (H) 100 - 111 mmol/L    CO2 17 (L) 21 - 32 mmol/L    Anion gap 9 3.0 - 18 mmol/L    Glucose 95 74 - 99 mg/dL    BUN 39 (H) 7.0 - 18 MG/DL    Creatinine 1.82 (H) 0.6 - 1.3 MG/DL    BUN/Creatinine ratio 21 (H) 12 - 20      eGFR 30 (L) >60 ml/min/1.73m2    Calcium 9.7 8.5 - 10.1 MG/DL   CBC W/O DIFF    Collection Time: 01/31/23  2:45 AM   Result Value Ref Range    WBC 4.8 4.6 - 13.2 K/uL    RBC 2.84 (L) 4.20 - 5.30 M/uL    HGB 7.2 (L) 12.0 - 16.0 g/dL    HCT 23.6 (L) 35.0 - 45.0 %    MCV 83.1 78.0 - 100.0 FL    MCH 25.4 24.0 - 34.0 PG    MCHC 30.5 (L) 31.0 - 37.0 g/dL    RDW 16.2 (H) 11.6 - 14.5 %    PLATELET 469 205 - 054 K/uL    MPV 9.4 9.2 - 11.8 FL    NRBC 0.0 0  WBC    ABSOLUTE NRBC 0.00 0.00 - 0.01 K/uL       Procedures/imaging: see electronic medical records for all procedures/Xrays and details which were not copied into this note but were reviewed prior to creation of Plan      CC: Irina Glaser MD

## 2023-01-31 NOTE — PROGRESS NOTES
Assumed patient care. Patient is alert and oriented resting on bed. Bed at low position wheels locked, call light within reach.

## 2023-01-31 NOTE — ROUTINE PROCESS
TRANSFER - IN REPORT:    Verbal report received from Hugo RN(name) on Dorothea Suarez  being received from ED(unit) for routine progression of care      Report consisted of patients Situation, Background, Assessment and   Recommendations(SBAR). Information from the following report(s) SBAR, Kardex, ED Summary, and Recent Results was reviewed with the receiving nurse. Opportunity for questions and clarification was provided. Assessment completed upon patients arrival to unit and care assumed.

## 2023-01-31 NOTE — PROGRESS NOTES
Reason for Admission:  Malaise, fatigue and PRUITT                      RUR Score:   low; 13%                  Plan for utilizing home health:      pending clinical progression     PCP: First and Last name:  Haley Otto MD     Name of Practice:    Are you a current patient: Yes/No:    Approximate date of last visit:    Can you participate in a virtual visit with your PCP:                     Current Advanced Directive/Advance Care Plan: Full Code      Healthcare Decision Maker:   Click here to complete [address] including selection of the Healthcare Decision Maker Relationship (ie \"Primary\")             Primary Decision Maker: Nandini Schreiber - [phone]                  Transition of Care Plan:      Home with physician follow up vs [facility] with physician follow up pending clinical progression              Chart reviewed. Per H&P \"Kath Aguilera is a 69 y.o. female with history of hypertension presents with fatigue that is worse with exertion and generalized malaise and sent to ED by PCP because of abnormal outpatient lab. At PCPs office labwork obtained showed a hemoglobin of 6.9 and creatinine of 2.2. Denies blood in stools or urine and denies passing black tarry stools. Fecal hemoccult test in the emergency room was negative. Hemoglobin in the emergency room today was 7.4 and her creatinine is 1.98.  CT abdomen and pelvis was obtained in the emergency room and it showed a pelvic mass. This mass was present in previous CT abd in 10/2019 but was smaller. \"      CM met with pt at bedside to discuss care transition. Pt requested. CM attempted to contact pt's daughter, Aurora November ([phone]). CM unable to leave a message. Pt with therapy consults pending. CM to await outcome of therapy evaluation to further assist with care transition. CM to continue to follow and assist.    Care Management Interventions  Mode of Transport at Discharge:  Other (see comment) (Family)  Transition of Care Consult (CM Consult): Discharge Planning  Health Maintenance Reviewed: Yes  Physical Therapy Consult: Yes  Occupational Therapy Consult: Yes  Support Systems: Child(ty), Other Family Member(s)  The Plan for Transition of Care is Related to the Following Treatment Goals : Home with physician follow up vs [facility] with physician follow up pending clinical progression  The Patient and/or Patient Representative was Provided with a Choice of Provider and Agrees with the Discharge Plan?: Yes  Name of the Patient Representative Who was Provided with a Choice of Provider and Agrees with the Discharge Plan: family  Freedom of Choice List was Provided with Basic Dialogue that Supports the Patient's Individualized Plan of Care/Goals, Treatment Preferences and Shares the Quality Data Associated with the Providers?: Yes  Discharge Location  Patient Expects to be Discharged to[de-identified] Home with home health

## 2023-02-01 LAB
ANION GAP SERPL CALC-SCNC: 8 MMOL/L (ref 3–18)
BUN SERPL-MCNC: 34 MG/DL (ref 7–18)
BUN/CREAT SERPL: 22 (ref 12–20)
CALCIUM SERPL-MCNC: 8.6 MG/DL (ref 8.5–10.1)
CHLORIDE SERPL-SCNC: 111 MMOL/L (ref 100–111)
CO2 SERPL-SCNC: 20 MMOL/L (ref 21–32)
CREAT SERPL-MCNC: 1.58 MG/DL (ref 0.6–1.3)
ERYTHROCYTE [DISTWIDTH] IN BLOOD BY AUTOMATED COUNT: 16.3 % (ref 11.6–14.5)
GLUCOSE SERPL-MCNC: 101 MG/DL (ref 74–99)
HCT VFR BLD AUTO: 21.1 % (ref 35–45)
HGB BLD-MCNC: 6.4 G/DL (ref 12–16)
HISTORY CHECKED?,CKHIST: NORMAL
MCH RBC QN AUTO: 25.3 PG (ref 24–34)
MCHC RBC AUTO-ENTMCNC: 30.3 G/DL (ref 31–37)
MCV RBC AUTO: 83.4 FL (ref 78–100)
NRBC # BLD: 0 K/UL (ref 0–0.01)
NRBC BLD-RTO: 0 PER 100 WBC
PLATELET # BLD AUTO: 317 K/UL (ref 135–420)
PMV BLD AUTO: 9.5 FL (ref 9.2–11.8)
POTASSIUM SERPL-SCNC: 4.7 MMOL/L (ref 3.5–5.5)
RBC # BLD AUTO: 2.53 M/UL (ref 4.2–5.3)
SODIUM SERPL-SCNC: 139 MMOL/L (ref 136–145)
WBC # BLD AUTO: 4.2 K/UL (ref 4.6–13.2)

## 2023-02-01 PROCEDURE — 74011250636 HC RX REV CODE- 250/636: Performed by: FAMILY MEDICINE

## 2023-02-01 PROCEDURE — 36430 TRANSFUSION BLD/BLD COMPNT: CPT

## 2023-02-01 PROCEDURE — 74011250637 HC RX REV CODE- 250/637: Performed by: HOSPITALIST

## 2023-02-01 PROCEDURE — 85027 COMPLETE CBC AUTOMATED: CPT

## 2023-02-01 PROCEDURE — 74011000250 HC RX REV CODE- 250: Performed by: FAMILY MEDICINE

## 2023-02-01 PROCEDURE — 36415 COLL VENOUS BLD VENIPUNCTURE: CPT

## 2023-02-01 PROCEDURE — 80048 BASIC METABOLIC PNL TOTAL CA: CPT

## 2023-02-01 PROCEDURE — 51798 US URINE CAPACITY MEASURE: CPT

## 2023-02-01 PROCEDURE — 74011250636 HC RX REV CODE- 250/636: Performed by: HOSPITALIST

## 2023-02-01 PROCEDURE — 74011250637 HC RX REV CODE- 250/637: Performed by: FAMILY MEDICINE

## 2023-02-01 PROCEDURE — 65270000029 HC RM PRIVATE

## 2023-02-01 PROCEDURE — 74011250637 HC RX REV CODE- 250/637: Performed by: STUDENT IN AN ORGANIZED HEALTH CARE EDUCATION/TRAINING PROGRAM

## 2023-02-01 PROCEDURE — 30233N1 TRANSFUSION OF NONAUTOLOGOUS RED BLOOD CELLS INTO PERIPHERAL VEIN, PERCUTANEOUS APPROACH: ICD-10-PCS | Performed by: FAMILY MEDICINE

## 2023-02-01 PROCEDURE — P9016 RBC LEUKOCYTES REDUCED: HCPCS

## 2023-02-01 PROCEDURE — 74011250636 HC RX REV CODE- 250/636: Performed by: STUDENT IN AN ORGANIZED HEALTH CARE EDUCATION/TRAINING PROGRAM

## 2023-02-01 RX ORDER — ATENOLOL 50 MG/1
100 TABLET ORAL 2 TIMES DAILY
Status: DISCONTINUED | OUTPATIENT
Start: 2023-02-01 | End: 2023-02-02

## 2023-02-01 RX ORDER — LORAZEPAM 1 MG/1
1 TABLET ORAL
Status: DISCONTINUED | OUTPATIENT
Start: 2023-02-01 | End: 2023-02-02 | Stop reason: HOSPADM

## 2023-02-01 RX ORDER — HYDRALAZINE HYDROCHLORIDE 50 MG/1
100 TABLET, FILM COATED ORAL 3 TIMES DAILY
Status: DISCONTINUED | OUTPATIENT
Start: 2023-02-01 | End: 2023-02-02

## 2023-02-01 RX ORDER — AMLODIPINE BESYLATE 5 MG/1
10 TABLET ORAL DAILY
Status: DISCONTINUED | OUTPATIENT
Start: 2023-02-02 | End: 2023-02-02

## 2023-02-01 RX ORDER — ATORVASTATIN CALCIUM 20 MG/1
40 TABLET, FILM COATED ORAL
Status: DISCONTINUED | OUTPATIENT
Start: 2023-02-01 | End: 2023-02-02 | Stop reason: HOSPADM

## 2023-02-01 RX ORDER — SODIUM CHLORIDE 9 MG/ML
50 INJECTION, SOLUTION INTRAVENOUS CONTINUOUS
Status: DISCONTINUED | OUTPATIENT
Start: 2023-02-01 | End: 2023-02-02

## 2023-02-01 RX ORDER — CARVEDILOL 3.12 MG/1
3.12 TABLET ORAL 2 TIMES DAILY WITH MEALS
Status: DISCONTINUED | OUTPATIENT
Start: 2023-02-01 | End: 2023-02-02 | Stop reason: HOSPADM

## 2023-02-01 RX ORDER — LANOLIN ALCOHOL/MO/W.PET/CERES
400 CREAM (GRAM) TOPICAL 2 TIMES DAILY
Status: DISCONTINUED | OUTPATIENT
Start: 2023-02-01 | End: 2023-02-02 | Stop reason: HOSPADM

## 2023-02-01 RX ORDER — SODIUM CHLORIDE 9 MG/ML
250 INJECTION, SOLUTION INTRAVENOUS AS NEEDED
Status: DISCONTINUED | OUTPATIENT
Start: 2023-02-01 | End: 2023-02-02 | Stop reason: HOSPADM

## 2023-02-01 RX ADMIN — SODIUM CHLORIDE 50 ML/HR: 9 INJECTION, SOLUTION INTRAVENOUS at 16:54

## 2023-02-01 RX ADMIN — Medication 400 MG: at 13:24

## 2023-02-01 RX ADMIN — SODIUM CHLORIDE, PRESERVATIVE FREE 5 ML: 5 INJECTION INTRAVENOUS at 14:00

## 2023-02-01 RX ADMIN — ACETAMINOPHEN 650 MG: 325 TABLET ORAL at 09:09

## 2023-02-01 RX ADMIN — SODIUM CHLORIDE, PRESERVATIVE FREE 10 ML: 5 INJECTION INTRAVENOUS at 06:04

## 2023-02-01 RX ADMIN — SODIUM CHLORIDE, PRESERVATIVE FREE 10 ML: 5 INJECTION INTRAVENOUS at 22:09

## 2023-02-01 RX ADMIN — HEPARIN SODIUM 5000 UNITS: 5000 INJECTION INTRAVENOUS; SUBCUTANEOUS at 13:24

## 2023-02-01 RX ADMIN — ATORVASTATIN CALCIUM 40 MG: 20 TABLET, FILM COATED ORAL at 22:08

## 2023-02-01 RX ADMIN — SODIUM BICARBONATE 1300 MG: 650 TABLET ORAL at 22:08

## 2023-02-01 RX ADMIN — Medication 400 MG: at 22:11

## 2023-02-01 RX ADMIN — HEPARIN SODIUM 5000 UNITS: 5000 INJECTION INTRAVENOUS; SUBCUTANEOUS at 22:08

## 2023-02-01 RX ADMIN — IRON SUCROSE 200 MG: 20 INJECTION, SOLUTION INTRAVENOUS at 10:52

## 2023-02-01 RX ADMIN — FAMOTIDINE 20 MG: 20 TABLET, FILM COATED ORAL at 09:09

## 2023-02-01 RX ADMIN — SODIUM BICARBONATE 1300 MG: 650 TABLET ORAL at 09:09

## 2023-02-01 RX ADMIN — CARVEDILOL 3.12 MG: 3.12 TABLET, FILM COATED ORAL at 16:57

## 2023-02-01 NOTE — PROGRESS NOTES
Hospitalist Progress Note    Patient: Brian Olvera MRN: [id number]  CSN: [id number]    YOB: 1955  Age: 69 y.o. Sex: female    DOA: 1/30/2023 LOS:  LOS: 2 days                Assessment/Plan     Patient Active Problem List   Diagnosis Code    Hypomagnesemia E83.42    Arrhythmia I49.9    Symptomatic anemia D64.9    QUINN (acute kidney injury) ([address] N17.9    Mass of pelvis R19.00    HTN (hypertension) I10        Chief complaint :  Fatigue  69 y.o. female with history of hypertension presents with fatigue that is worse with exertion and generalized malaise and sent to ED by PCP because of abnormal outpatient lab. Admitted for symptomatic anemia, QUINN and pelvic mass. Symptomatic anemia -  Iron panel with low iron   b12 and folate in normal range  Trend CBC  Transfuse to keep Hb >7  On venofer     QUINN on CKD-  No urinary obstruction on CT scan. Gentle IVF  Avoid nephrotoxic agents. Nephrology following     Pelvic mass-  Gynecology oncology service not available at [facility]   patient will need to follow-up with Gyn/onc as outpatient     Hypertension-  On multiple home meds,   BP slightly elevated  Started on coreg. DVT prophylaxis with heparin. Discussed with daughter and daughter in law. Explained hospitalization, anemia, QUINN on CKD, pelvic mass. Importance of follow up with Gyn/onc as outpatient. All questions answered. Disposition : 1-2 days    Review of systems  General: No fevers or chills. Cardiovascular: No chest pain or pressure. No palpitations. Pulmonary: No shortness of breath. Gastrointestinal: No nausea, vomiting. Physical Exam:  General: Awake, cooperative, no acute distress    HEENT: NC, Atraumatic. PERRLA, anicteric sclerae. Lungs: CTA Bilaterally. No Wheezing/Rhonchi/Rales. Heart:  S1 S2,  No murmur, No Rubs, No Gallops  Abdomen: Soft, Non distended, Non tender. +Bowel sounds,   Extremities: No c/c/e  Psych:   Not anxious or agitated.   Neurologic:  No acute neurological deficit. Vital signs/Intake and Output:  Visit Vitals  BP (!) 148/68   Pulse (!) 118   Temp 97.6 °F (36.4 °C)   Resp 18   Ht 4' 11\" (1.499 m)   Wt 59.6 kg (131 lb 6.3 oz)   SpO2 97%   Breastfeeding No   BMI 26.54 kg/m²     Current Shift:  No intake/output data recorded. Last three shifts:  01/30 1901 - 02/01 0700  In: -   Out: 400 [Urine:400]            Labs: Results:       Chemistry Recent Labs     02/01/23 0114 01/31/23 0245 01/30/23 1922   * 95 260*    142 136   K 4.7 5.2 5.9*    116* 111   CO2 20* 17* 17*   BUN 34* 39* 45*   CREA 1.58* 1.82* 1.98*   CA 8.6 10.1  9.7 9.4   AGAP 8 9 8   BUCR 22* 21* 23*   AP  --   --  84   TP  --   --  8.1   ALB  --   --  3.5   GLOB  --   --  4.6*   AGRAT  --   --  0.8      CBC w/Diff Recent Labs     02/01/23 0114 01/31/23 0245 01/30/23 1922   WBC 4.2* 4.8 6.1   RBC 2.53* 2.84* 2.90*   HGB 6.4* 7.2* 7.4*   HCT 21.1* 23.6* 24.3*    412 476*   GRANS  --   --  85*   LYMPH  --   --  10*   EOS  --   --  0      Cardiac Enzymes No results for input(s): CPK, CKND1, ASHELY in the last 72 hours. No lab exists for component: CKRMB, TROIP   Coagulation No results for input(s): PTP, INR, APTT, INREXT, INREXT in the last 72 hours. Lipid Panel Lab Results   Component Value Date/Time    Cholesterol, total 193 09/19/2018 05:00 AM    HDL Cholesterol 14 (L) 09/19/2018 05:00 AM    LDL, calculated 138.8 (H) 09/19/2018 05:00 AM    VLDL, calculated 40.2 09/19/2018 05:00 AM    Triglyceride 201 (H) 09/19/2018 05:00 AM    CHOL/HDL Ratio 13.8 (H) 09/19/2018 05:00 AM      BNP No results for input(s): BNPP in the last 72 hours.    Liver Enzymes Recent Labs     01/30/23 1922   TP 8.1   ALB 3.5   AP 84      Thyroid Studies Lab Results   Component Value Date/Time    TSH 2.76 12/03/2020 08:27 PM        Procedures/imaging: see electronic medical records for all procedures/Xrays and details which were not copied into this note but were reviewed prior to creation of Plan

## 2023-02-01 NOTE — PROGRESS NOTES
OCCUPATIONAL THERAPY EVALUATION/DISCHARGE    Patient: Sharri Messer (80 y.o. female)  Date: 1/31/2023  Primary Diagnosis: Symptomatic anemia [D64.9]       Precautions:      PLOF: independent with ADLs and transfers     ASSESSMENT AND RECOMMENDATIONS:  Based on the objective data described below, the patient presents to be at her baseline for carryover of ADLs and transfers and is independent for the same following above mentioned medical diagnosis. Pt presented supine in bed and agrees to participate with therapy. Pt performed bed mobility, supine<>sit, sit<>Stand transfers, toilet transfers, toileting, grooming and LB dressing with independence during this session. Pt was left supine in bed at the end of session in NAD. Pt's call bell and room telephone left within reach. Pt's daughter present in the room and interprets for the patient during this session as pt defers utilizing [employer] interpreting services at this time. Education: Pt education on safety with transfers, fall prevention and to call for assistance was provided. Pt verbalized understanding for the same. Skilled occupational therapy is not indicated at this time. Discharge Recommendations: None  Further Equipment Recommendations for Discharge: N/A    AMPAC: 24/24    This AMPAC score should be considered in conjunction with interdisciplinary team recommendations to determine the most appropriate discharge setting. Patient's social support, diagnosis, medical stability, and prior level of function should also be taken into consideration. SUBJECTIVE:   Patient stated Okay.     OBJECTIVE DATA SUMMARY:     Past Medical History:   Diagnosis Date    Arrhythmia     \"my heart beats fast\" \" I am awaiting results from Cardiologist\"    Hypertension     Psychiatric disorder     anxiety   No past surgical history on file.   Barriers to Learning/Limitations: yes;  language  Compensate with: visual, verbal, tactile, kinesthetic cues/model    Home Situation:   Home Situation  Home Environment: Private residence  # Steps to Enter: 0  One/Two Story Residence: Two story  # of Interior Steps: 8  Interior Rails: Both  Lift Chair Available: No  Living Alone: No  Support Systems: Child(ty)  Patient Expects to be Discharged to[de-identified] Home  Current DME Used/Available at Home: None  Tub or Shower Type: Tub/Shower combination  []     Right hand dominant   []     Left hand dominant    Cognitive/Behavioral Status:  Neurologic State: Alert  Orientation Level: Oriented X4  Cognition: Appropriate for age attention/concentration  Safety/Judgement: Fall prevention    Skin: intact  Edema: none    Vision/Perceptual:    Tracking: Able to track stimulus in all quadrants w/o difficulty    Coordination: BUE  Coordination: Within functional limits  Fine Motor Skills-Upper: Left Intact; Right Intact    Gross Motor Skills-Upper: Left Intact; Right Intact  Balance:  Sitting: Intact  Standing: Intact; Without support  Strength: BUE  Strength: Generally decreased, functional  Tone & Sensation: BUE  Sensation: Intact  Range of Motion: BUE  AROM: Generally decreased, functional  Functional Mobility and Transfers for ADLs:  Bed Mobility:  Rolling: Independent  Supine to Sit: Independent  Sit to Supine: Independent  Scooting: Independent  Transfers:  Sit to Stand: Independent  Stand to Sit: Independent   Toilet Transfer : Independent  ADL Assessment:  Feeding: Independent    Oral Facial Hygiene/Grooming: Independent    Upper Body Dressing: Independent    Lower Body Dressing: Independent    Toileting: Independent  ADL Intervention:  Grooming  Grooming Assistance: Independent  Position Performed: Standing  Washing Hands: Independent    Lower Body Dressing Assistance  Dressing Assistance: Independent  Socks: Independent  Leg Crossed Method Used: Yes  Position Performed: Seated edge of bed  Cues: [place]; [address]  Toileting Assistance: Independent  Bladder Hygiene: Independent  Clothing Management: Independent    Cognitive Retraining  Safety/Judgement: Fall prevention  Pain:  Pain level pre-treatment: 0/10   Pain level post-treatment: 0/10   Pain Intervention(s): Medication (see MAR); Rest, Ice, Repositioning   Response to intervention: Nurse notified, See doc flow    Activity Tolerance:   Good     Please refer to the flowsheet for vital signs taken during this treatment. After treatment:   []  Patient left in no apparent distress sitting up in chair  [x]  Patient left in no apparent distress in bed  [x]  Call bell left within reach  [x]  Nursing notified  [x]  Caregiver present  []  Bed alarm activated    COMMUNICATION/EDUCATION:   [x]      Role of Occupational Therapy in the acute care setting  [x]      Home safety education was provided and the patient/caregiver indicated understanding. [x]      Patient/family have participated as able and agree with findings and recommendations. []      Patient is unable to participate in plan of care at this time. Thank you for this referral.  Meño Hunt OTR/L  Time Calculation: 15 mins      Eval Complexity: History: LOW Complexity : Brief history review ; Examination: LOW Complexity : 1-3 performance deficits relating to physical, cognitive , or psychosocial skils that result in activity limitations and / or participation restrictions ; Decision Making:LOW Complexity : No comorbidities that affect functional and no verbal or physical assistance needed to complete eval tasks     [facility]-PAC® Daily Activity Inpatient Short Form (6-Clicks)*    How much HELP from another person does the patient currently need    (If the patient hasn't done an activity recently, how much help from another person do you think he/she would need if he/she tried?)   Total (Total A or Dep)   A Lot  (Mod to Max A)   A Little (Sup or Min A)   None (Mod I to I)   Putting on and taking off regular lower body clothing? [] 1 [] 2 [] 3 [x] 4   2.  Bathing (including washing, rinsing,      drying)? [] 1 [] 2 [] 3 [x] 4   3. Toileting, which includes using toilet, bedpan or urinal?   [] 1 [] 2 [] 3 [x] 4   4. Putting on and taking off regular upper body clothing? [] 1 [] 2 [] 3 [x] 4   5. Taking care of personal grooming such as brushing teeth? [] 1 [] 2 [] 3 [x] 4   6. Eating meals? [] 1 [] 2 [] 3 [x] 4     Based on an AM-PAC score of **/24 and their current ADL deficits; it is recommended that the patient have 5-7 sessions per week of Occupational Therapy at d/c to increase the patient's independence. Currently, this patient demonstrates the potential endurance, and/or tolerance for 3 hours of therapy each day at d/c. Based on an AM-PAC score of **/24 and their current ADL deficits; it is recommended that the patient have 3-5 sessions per week of Occupational Therapy at d/c to increase the patient's independence. Based on an AM-PAC score of **/24 and their current ADL deficits; it is recommended that the patient have 2-3 sessions per week of Occupational Therapy at d/c to increase the patient's independence. At this time and based on an AM-PAC score of **/24, no further OT is recommended upon discharge due to (i.e. patient at baseline functional statusetc). Recommend patient returns to prior setting with prior services.

## 2023-02-01 NOTE — ROUTINE PROCESS
Bedside and Verbal shift change report given to Linnea Flores RN (oncoming nurse) by Annabelle Fabry, RN (offgoing nurse). Report included the following information SBAR, Kardex, Intake/Output, MAR, Recent Results, and Cardiac Rhythm SR/SB, box #48 .

## 2023-02-01 NOTE — PROGRESS NOTES
Bedside shift change report given to [address] rn (oncoming nurse) by Laurence Moseley RN   (offgoing nurse). Report included the following information SBAR, Kardex, Intake/Output, MAR, and Recent Results.

## 2023-02-01 NOTE — PROGRESS NOTES
RENAL CONSULT PROGRESS NOTE   2023    Patient:  Verona Godoy  :  1955  Gender:  female  MRN #:  [id number]    Reason for Consult: QUINN on CKD and anemia. Subjective:  Examined and talked in presence of her daughter who translated for me in her preference,  she does not want to use interpretor   Says she is doing fine, no blood in stool or urine   Denied chest pain and shortness of breath  Urinating fine       Objective:    Visit Vitals  BP (!) 152/66   Pulse 80   Temp 98.7 °F (37.1 °C)   Resp 18   Ht 4' 11\" (1.499 m)   Wt 59.6 kg (131 lb 6.3 oz)   SpO2 99%   Breastfeeding No   BMI 26.54 kg/m²       Physical Exam:    Pt awake,  alert and comfortable  Lung: clear to auscultation,  Ext: no edema   CNS- Oriented to time , place and person     Laboratory Data:    Lab Results   Component Value Date    BUN 34 (H) 2023    BUN 39 (H) 2023    BUN 45 (H) 2023     2023     2023     2023    CO2 20 (L) 2023    CO2 17 (L) 2023    CO2 17 (L) 2023     Lab Results   Component Value Date    WBC 4.2 (L) 2023    HGB 6.4 (L) 2023    HCT 21.1 (L) 2023     No components found for: CALCIUM, PHOSPHORUS, MAGNESIUM  Lab Results   Component Value Date    HDL 14 (L) 2018     No results found for: SPECIMENTYP, TURBIDITY, UGLU    Imaging Reveiwed:    CT abdomen: KIDNEYS/URETERS: No mass, calculus, or hydronephrosis  Uterine mass    Assessment:   she is 69 yrs young lady with ongoing Hypertension for 30 yrs which is mostly uncontrolled per daughter , who was taking 6-7 times NSAID per month for back pain admitted for renal failure and anemia .      CKD stage 3GA/3G - creatinine was 2.06 mg/dl on 23  and it was 2.8 mg/dl on 23   which was 1.98 mg/dl on admission, in absence of blood work up in  , it is unable to predict if this is QUINN , as creatinine is improving compared to 4 weeks ago  and 5 days prior , most likely this represent CKD with fluctuation in GFR/creatinine due to hemodynamics and anemia      -Anemia  -Hypertension   -Metabolic acidosis   -Hyperkalemia - improved   - Pelvis mass     Plan:     Decent urine output and improving renal function .  - Encourage oral water intake, no need for volume expansion as creatinine is trending down  Urine electrolytes ,UPC, PTH - pending   Strict intake and output recording , especially urine output   Ensure that patient does not retain urine   Bladder scan daily prn    Avoid NSAIDS, contrast , nephrotoxin   Dose all medicine for current eGFR   No clinical and metabolic indication of dialysis     Hypertension: she takes hydralazine 100 mg TID, atenolol 100 mg daily, HCTZ 25 mg and losartan 100 mg daily    - BP now above target range   - will switch atenolol to coreg 3.125 mg BID  Hold losartan and HCTZ for now   If BP still high on coreg resume hydralazine      Anemia- low ferritin/iron   Hemoglobin dropped again today - transfuse 1 prbc    iv venofer first  200 mg daily fro 5 doses   Needs further work up for pelvic mass, consult Gyn   Transfusion prn to keep hemoglobin above 7 gram/dl     Metabolic Acidosis- sodium bicarbonate 1300 mg BID     Bone mineral disease: reviewed   PTH and calcium -wnl           Saira Boyle MD, MD  [facility], [facility].- Nephrology

## 2023-02-01 NOTE — ROUTINE PROCESS
Informed Dr. Rosas Carter of pt's H/H at 6.4/21.1%. No informed consent form on file. Per nephro note, pt does not like using official ; daughter left last night, unsure when she would be back. 54 Updated daughter about possible plan of care (ie. Blood transfusion). Daughter asked why it dropped to a certain level where pt would need a transfusion; this RN reassured daughter that MD would discuss these things with them during rounds. Daughter expressed gratitude, no further questions or concerns at this time.

## 2023-02-01 NOTE — PROGRESS NOTES
Problem: Mobility Impaired (Adult and Pediatric)  Goal: *Acute Goals and Plan of Care (Insert Text)  Description: No goals necessary at this time as pt demonstrates functional mobility with independence. Outcome: Resolved/Met    PHYSICAL THERAPY EVALUATION AND DISCHARGE    Patient: Jeremy Velez (62 y.o. female)  Date: 1/31/2023  Primary Diagnosis: Symptomatic anemia [D64.9]  Precautions:  PLOF: independent    ASSESSMENT :  Based on the objective data described below, the patient presents with ability to perform functional mobility tasks with independence. Pt is Arabic speaking, AMN interpretor system unavailable for use at this time, therefore pt nurse Belynda Dance acted as interpretor this session. Pt reports no pain, and independence with ambulation PTA. Demonstrates gt 180ft with independence, and normal gt pattern, GB applied. Pt returned to room and left back in bed in NAD. Patient does not require further skilled intervention at this level of care. PLAN :  Recommendations and Planned Interventions:   No formal PT needs identified at this time. Discharge Recommendations: None  Further Equipment Recommendations for Discharge: N/A    AMPAC: 24/24    This AMPAC score should be considered in conjunction with interdisciplinary team recommendations to determine the most appropriate discharge setting. Patient's social support, diagnosis, medical stability, and prior level of function should also be taken into consideration. SUBJECTIVE:   Patient w/o c/o    OBJECTIVE DATA SUMMARY:     Past Medical History:   Diagnosis Date    Arrhythmia     \"my heart beats fast\" \" I am awaiting results from Cardiologist\"    Hypertension     Psychiatric disorder     anxiety   No past surgical history on file.   Barriers to Learning/Limitations: None  Compensate with: N/A  Home Situation:   Home Situation  Home Environment: Private residence  # Steps to Enter: 0  One/Two Story Residence: Two story  # of Interior Steps: 8  Interior Rails: Both  Lift Chair Available: No  Living Alone: No  Support Systems: Child(ty)  Patient Expects to be Discharged to[de-identified] Home  Current DME Used/Available at Home: None  Tub or Shower Type: Tub/Shower combination  Critical Behavior:  Neurologic State: Alert; Appropriate for age  Orientation Level: Oriented X4  Cognition: Follows commands  Psychosocial  Patient Behaviors: Calm; Cooperative  Purposeful Interaction: Yes  Pt Identified Daily Priority: Clinical issues (comment)  Caritas Process: Nurture loving kindness  Caring Interventions: Reassure  Reassure: Therapeutic listening; Informing  Therapeutic Modalities: Humor; Intentional therapeutic touch  Strength:    Strength: Within functional limits  Range Of Motion:  AROM: Within functional limits  Functional Mobility:  Bed Mobility:  Rolling: Independent  Supine to Sit: Independent  Sit to Supine: Independent  Scooting: Independent  Transfers:  Sit to Stand: Independent  Stand to Sit: Independent  Balance:   Sitting: Intact  Standing: Intact; Without support  Ambulation/Gait Training:  Distance (ft): 180 Feet (ft)  Assistive Device: Gait belt  Ambulation - Level of Assistance: Independent  Gait Description (WDL): Exceptions to WDL  Pain:  Pain level pre-treatment: 0/10   Pain level post-treatment: 0/10  Pain Intervention(s): Medication (see MAR); Rest, Ice, Repositioning   Response to intervention: Nurse notified, See doc flow    Activity Tolerance:   Good   Please refer to the flowsheet for vital signs taken during this treatment. After treatment:   []         Patient left in no apparent distress sitting up in chair  [x]         Patient left in no apparent distress in bed  [x]         Call bell left within reach  [x]         Nursing notified  []         Caregiver present  []         Bed alarm activated  []         SCDs applied    COMMUNICATION/EDUCATION:   [x]         Role of Physical Therapy in the acute care setting.   [x]         Fall prevention education was provided and the patient/caregiver indicated understanding. [x]         Patient/family have participated as able in goal setting and plan of care. [x]         Patient/family agree to work toward stated goals and plan of care. []         Patient understands intent and goals of therapy, but is neutral about his/her participation. []         Patient is unable to participate in goal setting/plan of care: ongoing with therapy staff.  []         Other: Thank you for this referral.  Suha Henry, PT   Time Calculation: 13 mins      Eval Complexity: History: HIGH Complexity :3+ comorbidities / personal factors will impact the outcome/ POC Exam:LOW Complexity : 1-2 Standardized tests and measures addressing body structure, function, activity limitation and / or participation in recreation  Presentation: LOW Complexity : Stable, uncomplicated  Clinical Decision Making:Low Complexity    Overall Complexity:LOW     [facility] AM-PAC® Basic Mobility Inpatient Short Form (6-Clicks) Version 2    How much HELP from another person does the patient currently need    (If the patient hasn't done an activity recently, how much help from another person do you think he/she would need if he/she tried?)   Total (Total A or Dep)   A Lot  (Mod to Max A)   A Little (Sup or Min A)   None (Mod I to I)   Turning from your back to your side while in a flat bed without using bedrails? [] 1 [] 2 [] 3 [x] 4   2. Moving from lying on your back to sitting on the side of a flat bed without using bedrails? [] 1 [] 2 [] 3 [x] 4   3. Moving to and from a bed to a chair (including a wheelchair)? [] 1 [] 2 [] 3 [x] 4   4. Standing up from a chair using your arms (e.g., wheelchair, or bedside chair)? [] 1 [] 2 [] 3 [x] 4   5. Walking in hospital room? [] 1 [] 2 [] 3 [x] 4   6. Climbing 3-5 steps with a railing?+   [] 1 [] 2 [] 3 [x] 4   +If stair climbing cannot be assessed, skip item #6.   Sum responses from items 1-5. At this time and based on an AM-PAC score of 24/24 (or **/20 if omitting stairs), no further PT is recommended upon discharge due to (i.e. patient at baseline functional statusetc). Recommend patient returns to prior setting with prior services.

## 2023-02-01 NOTE — ROUTINE PROCESS
Andra MIRZA regarding pt's bladder scan result of 483 mL with no urge to void. Received order to straight cath x1, but pt unable to consent at the moment. Daughter said she'll be here around 7-7:30am, will do straight cath as soon as daughter arrives.

## 2023-02-01 NOTE — ROUTINE PROCESS
Paged on-call regarding daughter's concern about pt's most recent BP (144/55) and how none of her BP meds have been restarted since admission. Per Dr. Arina Bennett, refer to nephro in the morning. Will keep close monitoring of BP trend or any symptoms.

## 2023-02-01 NOTE — PROGRESS NOTES
LESLIE Shoemaker helped with interpretation. I have discussed with the patient the rationale for blood component transfusion; its benefits in treating or preventing fatigue, organ damage, or death; and its risk which includes mild transfusion reactions, rare risk of blood borne infection, or more serious but rare reactions. I have discussed the alternatives to transfusion, including the risk and consequences of not receiving transfusion. The patient had an opportunity to ask questions and had agreed to proceed with transfusion of blood components.      Prashant Ye MD

## 2023-02-02 ENCOUNTER — HOSPITAL ENCOUNTER (INPATIENT)
Age: 68
LOS: 3 days | Discharge: HOME | DRG: 309 | End: 2023-02-06
Attending: FAMILY MEDICINE | Admitting: INTERNAL MEDICINE
Payer: MEDICARE

## 2023-02-02 VITALS
RESPIRATION RATE: 18 BRPM | WEIGHT: 131.39 LBS | DIASTOLIC BLOOD PRESSURE: 61 MMHG | OXYGEN SATURATION: 100 % | HEIGHT: 59 IN | TEMPERATURE: 97.5 F | BODY MASS INDEX: 26.49 KG/M2 | SYSTOLIC BLOOD PRESSURE: 149 MMHG | HEART RATE: 64 BPM

## 2023-02-02 DIAGNOSIS — R94.31 ABNORMAL EKG: ICD-10-CM

## 2023-02-02 DIAGNOSIS — I47.1 SVT (SUPRAVENTRICULAR TACHYCARDIA) (HCC): Primary | ICD-10-CM

## 2023-02-02 LAB
ABO + RH BLD: NORMAL
ALBUMIN SERPL-MCNC: 3.4 G/DL (ref 3.4–5)
ALBUMIN/GLOB SERPL: 0.8 (ref 0.8–1.7)
ALP SERPL-CCNC: 95 U/L (ref 45–117)
ALT SERPL-CCNC: 17 U/L (ref 13–56)
ANION GAP SERPL CALC-SCNC: 10 MMOL/L (ref 3–18)
ANION GAP SERPL CALC-SCNC: 5 MMOL/L (ref 3–18)
AST SERPL-CCNC: 26 U/L (ref 10–38)
ATRIAL RATE: 89 BPM
BASOPHILS # BLD: 0 K/UL (ref 0–0.1)
BASOPHILS NFR BLD: 0 % (ref 0–2)
BILIRUB SERPL-MCNC: 0.5 MG/DL (ref 0.2–1)
BLD PROD TYP BPU: NORMAL
BLOOD GROUP ANTIBODIES SERPL: NORMAL
BPU ID: NORMAL
BUN SERPL-MCNC: 26 MG/DL (ref 7–18)
BUN SERPL-MCNC: 29 MG/DL (ref 7–18)
BUN/CREAT SERPL: 18 (ref 12–20)
BUN/CREAT SERPL: 22 (ref 12–20)
CALCIUM SERPL-MCNC: 8.6 MG/DL (ref 8.5–10.1)
CALCIUM SERPL-MCNC: 9.2 MG/DL (ref 8.5–10.1)
CALCULATED P AXIS, ECG09: 59 DEGREES
CALCULATED R AXIS, ECG10: 102 DEGREES
CALCULATED R AXIS, ECG10: 84 DEGREES
CALCULATED T AXIS, ECG11: 48 DEGREES
CALLED TO:,BCALL1: NORMAL
CHLORIDE SERPL-SCNC: 105 MMOL/L (ref 100–111)
CHLORIDE SERPL-SCNC: 111 MMOL/L (ref 100–111)
CO2 SERPL-SCNC: 20 MMOL/L (ref 21–32)
CO2 SERPL-SCNC: 22 MMOL/L (ref 21–32)
CREAT SERPL-MCNC: 1.34 MG/DL (ref 0.6–1.3)
CREAT SERPL-MCNC: 1.42 MG/DL (ref 0.6–1.3)
CROSSMATCH RESULT,%XM: NORMAL
DIAGNOSIS, 93000: NORMAL
DIAGNOSIS, 93000: NORMAL
DIFFERENTIAL METHOD BLD: ABNORMAL
EOSINOPHIL # BLD: 0.1 K/UL (ref 0–0.4)
EOSINOPHIL NFR BLD: 1 % (ref 0–5)
ERYTHROCYTE [DISTWIDTH] IN BLOOD BY AUTOMATED COUNT: 16 % (ref 11.6–14.5)
ERYTHROCYTE [DISTWIDTH] IN BLOOD BY AUTOMATED COUNT: 16 % (ref 11.6–14.5)
GLOBULIN SER CALC-MCNC: 4.4 G/DL (ref 2–4)
GLUCOSE SERPL-MCNC: 129 MG/DL (ref 74–99)
GLUCOSE SERPL-MCNC: 97 MG/DL (ref 74–99)
HCT VFR BLD AUTO: 28.3 % (ref 35–45)
HCT VFR BLD AUTO: 32.5 % (ref 35–45)
HGB BLD-MCNC: 10.5 G/DL (ref 12–16)
HGB BLD-MCNC: 9 G/DL (ref 12–16)
IMM GRANULOCYTES # BLD AUTO: 0 K/UL (ref 0–0.04)
IMM GRANULOCYTES NFR BLD AUTO: 1 % (ref 0–0.5)
LYMPHOCYTES # BLD: 0.8 K/UL (ref 0.9–3.6)
LYMPHOCYTES NFR BLD: 14 % (ref 21–52)
MAGNESIUM SERPL-MCNC: 1.9 MG/DL (ref 1.6–2.6)
MCH RBC QN AUTO: 26.3 PG (ref 24–34)
MCH RBC QN AUTO: 26.5 PG (ref 24–34)
MCHC RBC AUTO-ENTMCNC: 31.8 G/DL (ref 31–37)
MCHC RBC AUTO-ENTMCNC: 32.3 G/DL (ref 31–37)
MCV RBC AUTO: 81.5 FL (ref 78–100)
MCV RBC AUTO: 83.5 FL (ref 78–100)
MONOCYTES # BLD: 0.3 K/UL (ref 0.05–1.2)
MONOCYTES NFR BLD: 6 % (ref 3–10)
NEUTS SEG # BLD: 4.6 K/UL (ref 1.8–8)
NEUTS SEG NFR BLD: 79 % (ref 40–73)
NRBC # BLD: 0 K/UL (ref 0–0.01)
NRBC # BLD: 0 K/UL (ref 0–0.01)
NRBC BLD-RTO: 0 PER 100 WBC
NRBC BLD-RTO: 0 PER 100 WBC
P-R INTERVAL, ECG05: 158 MS
PLATELET # BLD AUTO: 320 K/UL (ref 135–420)
PLATELET # BLD AUTO: 344 K/UL (ref 135–420)
PMV BLD AUTO: 9.6 FL (ref 9.2–11.8)
PMV BLD AUTO: 9.7 FL (ref 9.2–11.8)
POTASSIUM SERPL-SCNC: 4.8 MMOL/L (ref 3.5–5.5)
POTASSIUM SERPL-SCNC: 5 MMOL/L (ref 3.5–5.5)
PROT SERPL-MCNC: 7.8 G/DL (ref 6.4–8.2)
Q-T INTERVAL, ECG07: 268 MS
Q-T INTERVAL, ECG07: 356 MS
QRS DURATION, ECG06: 78 MS
QRS DURATION, ECG06: 82 MS
QTC CALCULATION (BEZET), ECG08: 433 MS
QTC CALCULATION (BEZET), ECG08: 441 MS
RBC # BLD AUTO: 3.39 M/UL (ref 4.2–5.3)
RBC # BLD AUTO: 3.99 M/UL (ref 4.2–5.3)
SODIUM SERPL-SCNC: 135 MMOL/L (ref 136–145)
SODIUM SERPL-SCNC: 138 MMOL/L (ref 136–145)
SPECIMEN EXP DATE BLD: NORMAL
STATUS OF UNIT,%ST: NORMAL
TROPONIN I SERPL HS-MCNC: 32 NG/L (ref 0–54)
TROPONIN I SERPL HS-MCNC: 59 NG/L (ref 0–54)
UNIT DIVISION, %UDIV: 0
VENTRICULAR RATE, ECG03: 163 BPM
VENTRICULAR RATE, ECG03: 89 BPM
WBC # BLD AUTO: 4.2 K/UL (ref 4.6–13.2)
WBC # BLD AUTO: 5.9 K/UL (ref 4.6–13.2)

## 2023-02-02 PROCEDURE — 36415 COLL VENOUS BLD VENIPUNCTURE: CPT

## 2023-02-02 PROCEDURE — 74011250636 HC RX REV CODE- 250/636: Performed by: FAMILY MEDICINE

## 2023-02-02 PROCEDURE — 99285 EMERGENCY DEPT VISIT HI MDM: CPT

## 2023-02-02 PROCEDURE — 84484 ASSAY OF TROPONIN QUANT: CPT

## 2023-02-02 PROCEDURE — 74011000250 HC RX REV CODE- 250: Performed by: FAMILY MEDICINE

## 2023-02-02 PROCEDURE — 84443 ASSAY THYROID STIM HORMONE: CPT

## 2023-02-02 PROCEDURE — 51798 US URINE CAPACITY MEASURE: CPT

## 2023-02-02 PROCEDURE — 74011250637 HC RX REV CODE- 250/637: Performed by: HOSPITALIST

## 2023-02-02 PROCEDURE — 80053 COMPREHEN METABOLIC PANEL: CPT

## 2023-02-02 PROCEDURE — 74011250637 HC RX REV CODE- 250/637: Performed by: FAMILY MEDICINE

## 2023-02-02 PROCEDURE — 85027 COMPLETE CBC AUTOMATED: CPT

## 2023-02-02 PROCEDURE — 83735 ASSAY OF MAGNESIUM: CPT

## 2023-02-02 PROCEDURE — 74011250636 HC RX REV CODE- 250/636: Performed by: STUDENT IN AN ORGANIZED HEALTH CARE EDUCATION/TRAINING PROGRAM

## 2023-02-02 PROCEDURE — 85025 COMPLETE CBC W/AUTO DIFF WBC: CPT

## 2023-02-02 PROCEDURE — 74011250637 HC RX REV CODE- 250/637: Performed by: STUDENT IN AN ORGANIZED HEALTH CARE EDUCATION/TRAINING PROGRAM

## 2023-02-02 PROCEDURE — 74011250636 HC RX REV CODE- 250/636: Performed by: HOSPITALIST

## 2023-02-02 RX ORDER — CARVEDILOL 3.12 MG/1
3.12 TABLET ORAL 2 TIMES DAILY WITH MEALS
Qty: 60 TABLET | Refills: 0 | Status: SHIPPED
Start: 2023-02-02 | End: 2023-02-06

## 2023-02-02 RX ORDER — AMLODIPINE BESYLATE 5 MG/1
5 TABLET ORAL DAILY
Status: DISCONTINUED | OUTPATIENT
Start: 2023-02-02 | End: 2023-02-02 | Stop reason: HOSPADM

## 2023-02-02 RX ORDER — METOPROLOL TARTRATE 5 MG/5ML
5 INJECTION INTRAVENOUS
Status: COMPLETED | OUTPATIENT
Start: 2023-02-02 | End: 2023-02-02

## 2023-02-02 RX ORDER — HYDRALAZINE HYDROCHLORIDE 50 MG/1
50 TABLET, FILM COATED ORAL 3 TIMES DAILY
Status: DISCONTINUED | OUTPATIENT
Start: 2023-02-02 | End: 2023-02-02 | Stop reason: HOSPADM

## 2023-02-02 RX ORDER — AMLODIPINE BESYLATE 5 MG/1
5 TABLET ORAL DAILY
Qty: 30 TABLET | Refills: 0 | Status: SHIPPED
Start: 2023-02-02 | End: 2023-02-06

## 2023-02-02 RX ORDER — ADENOSINE 3 MG/ML
6 INJECTION, SOLUTION INTRAVENOUS
Status: COMPLETED | OUTPATIENT
Start: 2023-02-02 | End: 2023-02-02

## 2023-02-02 RX ORDER — SODIUM BICARBONATE 650 MG/1
650 TABLET ORAL 2 TIMES DAILY
Status: DISCONTINUED | OUTPATIENT
Start: 2023-02-02 | End: 2023-02-02 | Stop reason: HOSPADM

## 2023-02-02 RX ORDER — LANOLIN ALCOHOL/MO/W.PET/CERES
325 CREAM (GRAM) TOPICAL 2 TIMES DAILY WITH MEALS
Qty: 60 TABLET | Refills: 0 | Status: SHIPPED | OUTPATIENT
Start: 2023-02-02

## 2023-02-02 RX ORDER — ADENOSINE 3 MG/ML
12 INJECTION, SOLUTION INTRAVENOUS
Status: DISPENSED | OUTPATIENT
Start: 2023-02-02 | End: 2023-02-03

## 2023-02-02 RX ADMIN — HEPARIN SODIUM 5000 UNITS: 5000 INJECTION INTRAVENOUS; SUBCUTANEOUS at 06:45

## 2023-02-02 RX ADMIN — Medication 400 MG: at 08:32

## 2023-02-02 RX ADMIN — ADENOSINE 6 MG: 3 INJECTION INTRAVENOUS at 20:44

## 2023-02-02 RX ADMIN — SODIUM CHLORIDE, PRESERVATIVE FREE 10 ML: 5 INJECTION INTRAVENOUS at 06:45

## 2023-02-02 RX ADMIN — SODIUM CHLORIDE 1000 ML: 9 INJECTION, SOLUTION INTRAVENOUS at 20:50

## 2023-02-02 RX ADMIN — IRON SUCROSE 200 MG: 20 INJECTION, SOLUTION INTRAVENOUS at 08:32

## 2023-02-02 RX ADMIN — SODIUM BICARBONATE 1300 MG: 650 TABLET ORAL at 08:32

## 2023-02-02 RX ADMIN — SODIUM CHLORIDE 50 ML/HR: 9 INJECTION, SOLUTION INTRAVENOUS at 08:46

## 2023-02-02 RX ADMIN — AMLODIPINE BESYLATE 5 MG: 5 TABLET ORAL at 12:49

## 2023-02-02 RX ADMIN — FAMOTIDINE 20 MG: 20 TABLET, FILM COATED ORAL at 08:35

## 2023-02-02 RX ADMIN — METOPROLOL TARTRATE 5 MG: 5 INJECTION, SOLUTION INTRAVENOUS at 21:39

## 2023-02-02 NOTE — DISCHARGE SUMMARY
Discharge Summary    Patient: Dorothea Suarez MRN: [id number]  CSN: [id number]    YOB: 1955  Age: 69 y.o. Sex: female    DOA: 1/30/2023 LOS:  LOS: 3 days   Discharge Date: 2/2/2023     Primary Care Provider:  Mamta Alston MD    Admission Diagnoses: Symptomatic anemia [D64.9]    Discharge Diagnoses:    Problem List as of 2/2/2023 Date Reviewed: 8/26/2021            Codes Class Noted - Resolved    [facility]) ([facility] ICD-10-CM: N17.9  ICD-9-CM: 584.9  1/31/2023 - Present        Mass of pelvis ICD-10-CM: R19.00  ICD-9-CM: 789.30  1/31/2023 - Present        HTN (hypertension) ICD-10-CM: I10  ICD-9-CM: 401.9  1/31/2023 - Present        * (Principal) Symptomatic anemia ICD-10-CM: D64.9  ICD-9-CM: 285.9  1/30/2023 - Present        Arrhythmia ICD-10-CM: I49.9  ICD-9-CM: 427.9  3/11/2021 - Present        Hypomagnesemia ICD-10-CM: E83.42  ICD-9-CM: 275.2  9/18/2018 - Present        RESOLVED: Hypertension ICD-10-CM: I10  ICD-9-CM: 401.9  8/3/2021 - 8/3/2021        RESOLVED: EKG abnormalities ICD-10-CM: R94.31  ICD-9-CM: 794.31  3/9/2021 - 3/9/2021           Discharge Medications:     Discharge Medication List as of 2/2/2023  3:09 PM        START taking these medications    Details   carvediloL (COREG) 3.125 mg tablet Take 1 Tablet by mouth two (2) times daily (with meals). , Normal, Disp-60 Tablet, R-0      ferrous sulfate 325 mg (65 mg iron) tablet Take 1 Tablet by mouth two (2) times daily (with meals). , Normal, Disp-60 Tablet, R-0           CONTINUE these medications which have CHANGED    Details   amLODIPine (NORVASC) 5 mg tablet Take 1 Tablet by mouth daily. , Normal, Disp-30 Tablet, R-0           CONTINUE these medications which have NOT CHANGED    Details   LORazepam (ATIVAN) 1 mg tablet Take 1 mg by mouth every four (4) hours as needed for Anxiety. , Historical Med      atorvastatin (LIPITOR) 40 mg tablet Take 40 mg by mouth nightly., Historical Med           STOP taking these medications       losartan-hydroCHLOROthiazide (HYZAAR) 100-25 mg per tablet Comments:   Reason for Stopping:         hydrALAZINE (APRESOLINE) 25 mg tablet Comments:   Reason for Stopping:         atenolol (TENORMIN) 100 mg tablet Comments:   Reason for Stopping:               Discharge Condition: Good    Procedures : none    Consults: Nephrology      PHYSICAL EXAM   Visit Vitals  BP (!) 149/61 (BP 1 Location: Left upper arm, BP Patient Position: At rest)   Pulse 64   Temp 97.5 °F (36.4 °C)   Resp 18   Ht 4' 11\" (1.499 m)   Wt 59.6 kg (131 lb 6.3 oz)   SpO2 100%   Breastfeeding No   BMI 26.54 kg/m²     General: Awake, cooperative, no acute distress    HEENT: NC, Atraumatic. PERRLA, EOMI. Anicteric sclerae. Lungs:  CTA Bilaterally. No Wheezing/Rhonchi/Rales. Heart:  Regular  rhythm,  No murmur, No Rubs, No Gallops  Abdomen: Soft, Non distended, Non tender. +Bowel sounds,   Extremities: No c/c/e  Psych:   Not anxious or agitated. Neurologic:  No acute neurological deficits. Admission HPI :   Ct Nieves is a 69 y.o. female with history of hypertension presents with fatigue that is worse with exertion and generalized malaise and sent to ED by PCP because of abnormal outpatient lab. At PCPs office labwork obtained showed a hemoglobin of 6.9 and creatinine of 2.2. Denies blood in stools or urine and denies passing black tarry stools. Fecal hemoccult test in the emergency room was negative. Hemoglobin in the emergency room today was 7.4 and her creatinine is 1.98.  CT abdomen and pelvis was obtained in the emergency room and it showed a pelvic mass. This mass was present in previous CT abd in 10/2019 but was smaller. Hospital Course :   Ms. Ximena aCrl was admitted to medical floor, she was seen and followed by nephrology.     Symptomatic anemia -  Stool for occult blood negative  Iron panel with low iron   b12 and folate in normal range  Received blood transfusion with improvement in H/H  Received venofer, will discharge on iron supplements. QUINN on CKD-  Seen and followed by nephrology  No urinary obstruction on CT scan. Gentle IVF  Avoid nephrotoxic agents. Pelvic mass-  Gynecology oncology service not available at [facility]   patient will need to follow-up with Gyn/onc as outpatient     Hypertension-  On multiple home meds,   Her medications changed. Started on coreg and continued on amlodipine. Add further medications depending on BP response as outpatient. Activity: Activity as tolerated    Diet: Cardiac Diet    Follow-up: PCP, Nephrology, Gyn/onc    Disposition: home    Minutes spent on discharge: 45       Labs: Results:       Chemistry Recent Labs     02/02/23 0254 02/01/23 0114 01/31/23 0245 01/30/23 1922   GLU 97 101* 95 260*    139 142 136   K 5.0 4.7 5.2 5.9*    111 116* 111   CO2 22 20* 17* 17*   BUN 29* 34* 39* 45*   CREA 1.34* 1.58* 1.82* 1.98*   CA 8.6 8.6 10.1  9.7 9.4   AGAP 5 8 9 8   BUCR 22* 22* 21* 23*   AP  --   --   --  84   TP  --   --   --  8.1   ALB  --   --   --  3.5   GLOB  --   --   --  4.6*   AGRAT  --   --   --  0.8      CBC w/Diff Recent Labs     02/02/23 0254 02/01/23 0114 01/31/23 0245 01/30/23 1922   WBC 4.2* 4.2* 4.8 6.1   RBC 3.39* 2.53* 2.84* 2.90*   HGB 9.0* 6.4* 7.2* 7.4*   HCT 28.3* 21.1* 23.6* 24.3*    317 412 476*   GRANS  --   --   --  85*   LYMPH  --   --   --  10*   EOS  --   --   --  0      Cardiac Enzymes No results for input(s): CPK, CKND1, ASHELY in the last 72 hours. No lab exists for component: CKRMB, TROIP   Coagulation No results for input(s): PTP, INR, APTT, INREXT in the last 72 hours.     Lipid Panel Lab Results   Component Value Date/Time    Cholesterol, total 193 09/19/2018 05:00 AM    HDL Cholesterol 14 (L) 09/19/2018 05:00 AM    LDL, calculated 138.8 (H) 09/19/2018 05:00 AM    VLDL, calculated 40.2 09/19/2018 05:00 AM    Triglyceride 201 (H) 09/19/2018 05:00 AM    CHOL/HDL Ratio 13. 8 (H) 09/19/2018 05:00 AM      BNP No results for input(s): BNPP in the last 72 hours. Liver Enzymes Recent Labs     01/30/23  1922   TP 8.1   ALB 3.5   AP 84      Thyroid Studies Lab Results   Component Value Date/Time    TSH 2.76 12/03/2020 08:27 PM            Significant Diagnostic Studies: CT ABD PELV WO CONT    Result Date: 1/30/2023  INDICATION: Acute kidney injury and anemia. Right lower back pain COMPARISON: 10/7/2019 TECHNIQUE: Thin axial images were obtained through the abdomen and pelvis. Coronal and sagittal reconstructions were generated. Oral contrast was not administered. CT dose reduction was achieved through use of a standardized protocol tailored for this examination and automatic exposure control for dose modulation. The absence of intravenous contrast material reduces the sensitivity for evaluation of the solid parenchymal organs of the abdomen. FINDINGS: LUNG BASES: Clear. INCIDENTALLY IMAGED HEART AND MEDIASTINUM: Small hiatal hernia LIVER: No mass or fatty infiltration. GALLBLADDER: Surgically absent. Pneumobilia. Question previous sphincterotomy SPLEEN: Not enlarged. PANCREAS: Not enlarged or inflamed. ADRENALS: 9 mm left adrenal nodule unchanged. Right adrenal gland is not enlarged KIDNEYS/URETERS: No mass, calculus, or hydronephrosis. STOMACH: Unremarkable. BOWEL: No dilatation or wall thickening. APPENDIX: Not identified PERITONEUM: No ascites or pneumoperitoneum. RETROPERITONEUM: No lymphadenopathy or aortic aneurysm. REPRODUCTIVE ORGANS: 3.1 x 1.3 x 1.2 cm fatty lesion within the posterior uterus. This is unchanged. At the dome of the bladder is 9.3 cm uniform hypodense mass. Uncertain if this is uterine or ovarian in origin. It previously measured 7.5 cm URINARY BLADDER: No mass or calculus. BONES: No destructive bone lesion. ADDITIONAL COMMENTS: N/A     1. Round low-density mass within the pelvis has increased in size slightly now measuring 9.3 cm.  Uncertain if this is ovarian or uterine origin. Low-grade neoplasm, physiologic cyst or endometrioma could have this appearance. Nonemergent follow-up with GYN is recommended        No results found for this or any previous visit. Please note that this dictation was completed with Voxound, the computer voice recognition software. Quite often unanticipated grammatical, syntax, homophones, and other interpretive errors are inadvertently transcribed by the computer software. Please disregard these errors. Please excuse any errors that have escaped final proofreading.      CC: Bola Wilder MD

## 2023-02-02 NOTE — PROGRESS NOTES
CM attempted to contact pt's daughter, Drew Leos ([phone]). CM unable to leave a message. Noted no recommendations from therapy. Please encourage ambulation as appropriate to assist with care transition. Care Management Interventions  Mode of Transport at Discharge:  Other (see comment) (Family)  Transition of Care Consult (CM Consult): Discharge Planning  Health Maintenance Reviewed: Yes  Physical Therapy Consult: Yes  Occupational Therapy Consult: Yes  Support Systems: Child(ty)  The Plan for Transition of Care is Related to the Following Treatment Goals : Home with physician follow up vs [facility] with physician follow up pending clinical progression  The Patient and/or Patient Representative was Provided with a Choice of Provider and Agrees with the Discharge Plan?: Yes  Name of the Patient Representative Who was Provided with a Choice of Provider and Agrees with the Discharge Plan: family  Freedom of Choice List was Provided with Basic Dialogue that Supports the Patient's Individualized Plan of Care/Goals, Treatment Preferences and Shares the Quality Data Associated with the Providers?: Yes  Discharge Location  Patient Expects to be Discharged to[de-identified] Home

## 2023-02-02 NOTE — Clinical Note
Patient Class[de-identified] OBSERVATION [104]   Type of Bed: Telemetry [19]   Cardiac Monitoring Required?: Yes   Reason for Observation: Elevated troponin   Admitting Diagnosis: Elevated troponin [[id number]]   Admitting Physician: Jorje Rene [[id number]]   Attending Physician: Jorje Rene [[id number]]

## 2023-02-02 NOTE — PROGRESS NOTES
Patient slept through the night. Vital signs stable, afebrile. H&H 28.3 & 9.0.      Bedside and Verbal shift change report given to LESLIE Mari (oncoming nurse) by Priya Frederick (offgoing nurse). Report included the following information SBAR, Kardex, Intake/Output, and MAR.

## 2023-02-02 NOTE — PROGRESS NOTES
RENAL CONSULT PROGRESS NOTE   2023    Patient:  Ramez Arcos  :  1955  Gender:  female  MRN #:  [id number]    Reason for Consult: QUINN on CKD and anemia. Subjective:  Examined and discussed with the helps of  # [phone] she is doing fine, no blood in stool or urine   Denied chest pain and shortness of breath  Urinating fine       Objective:    Visit Vitals  BP (!) 138/46   Pulse 80   Temp 97.9 °F (36.6 °C)   Resp 18   Ht 4' 11\" (1.499 m)   Wt 59.6 kg (131 lb 6.3 oz)   SpO2 99%   Breastfeeding No   BMI 26.54 kg/m²       Physical Exam:    Pt awake,  alert and comfortable  Lung: clear to auscultation,  Ext: no edema     Laboratory Data:    Lab Results   Component Value Date    BUN 29 (H) 2023    BUN 34 (H) 2023    BUN 39 (H) 2023     2023     2023     2023    CO2 22 2023    CO2 20 (L) 2023    CO2 17 (L) 2023     Lab Results   Component Value Date    WBC 4.2 (L) 2023    HGB 9.0 (L) 2023    HCT 28.3 (L) 2023     No components found for: CALCIUM, PHOSPHORUS, MAGNESIUM  Lab Results   Component Value Date    HDL 14 (L) 2018     No results found for: SPECIMENTYP, TURBIDITY, UGLU    Imaging Reveiwed:    CT abdomen: KIDNEYS/URETERS: No mass, calculus, or hydronephrosis  Uterine mass    Assessment:   she is 69 yrs young lady with ongoing Hypertension for 30 yrs which is mostly uncontrolled per daughter , who was taking 6-7 times NSAID per month for back pain admitted for renal failure and anemia .      CKD stage 3GA/3G - creatinine was 2.06 mg/dl on 23  and it was 2.8 mg/dl on 23   which was 1.98 mg/dl on admission, in absence of blood work up in  , it is unable to predict if this is QUINN , as creatinine is improving compared to 4 weeks ago  and 5 days prior , most likely this represent CKD with fluctuation in GFR/creatinine due to hemodynamics and anemia -Anemia  -Hypertension   -Metabolic acidosis   -Hyperkalemia - improved   - Pelvis mass     Plan:     Decent urine output and improving renal function every day   - Encourage oral water intake, no need for volume expansion as creatinine is trending down, will stop NS     Strict intake and output recording , especially urine output   Ensure that patient does not retain urine   Bladder scan daily prn    Avoid NSAIDS, contrast , nephrotoxin   Dose all medicine for current eGFR       Hypertension: she takes hydralazine 100 mg TID, atenolol 100 mg daily, HCTZ 25 mg and losartan 100 mg daily    - BP now above target range   - continue coreg 3.125 mg BID  Start amlodipine 5 mg today   Hold hydralazine , HCTZ/losartan for now - should be resumes as outpatient once BP start to go up first with hydralazine 50 mg TID and then HCTZ/losartan - explained to her daughter over phone     Anemia- low ferritin/iron   Hemoglobin improved transfusion today    iv venofer first  200 mg daily  5 doses as long as she is in house   Needs further work up for pelvic mass, consult Gyn   Transfusion prn to keep hemoglobin above 7 gram/dl     Metabolic Acidosis- sodium bicarbonate 650 mg BID     Bone mineral disease: reviewed   PTH and calcium -wnl       I have reviewed patient's record for pertinent labs, radiology and other test . I have reviewed old records. I have ordered labs, medications and radiology as necessary and indicated per patient's condition . Case discussed with the patein's primary physician and treatment team. Total time spent is approximately 55  minutes. Greater than 50 % of that time was spent in counseling and or care coordination.    Explained and examined using interpretor and discussed with her daughter over phone       Robson Dickey MD, MD  [facility].- Nephrology

## 2023-02-02 NOTE — PROGRESS NOTES
Bladder scan showed greater than 360. Patient would like some  time to try to void. Educated her we may need to straight cath will obtain a PVL upon completion of voiding.

## 2023-02-03 ENCOUNTER — APPOINTMENT (OUTPATIENT)
Dept: NON INVASIVE DIAGNOSTICS | Age: 68
DRG: 309 | End: 2023-02-03
Attending: INTERNAL MEDICINE
Payer: MEDICARE

## 2023-02-03 ENCOUNTER — APPOINTMENT (OUTPATIENT)
Dept: NUCLEAR MEDICINE | Age: 68
DRG: 309 | End: 2023-02-03
Attending: INTERNAL MEDICINE
Payer: MEDICARE

## 2023-02-03 ENCOUNTER — APPOINTMENT (OUTPATIENT)
Dept: VASCULAR SURGERY | Age: 68
DRG: 309 | End: 2023-02-03
Attending: INTERNAL MEDICINE
Payer: MEDICARE

## 2023-02-03 PROBLEM — I47.1 SVT (SUPRAVENTRICULAR TACHYCARDIA) (HCC): Status: ACTIVE | Noted: 2023-02-03

## 2023-02-03 PROBLEM — I47.10 SVT (SUPRAVENTRICULAR TACHYCARDIA) (HCC): Status: ACTIVE | Noted: 2023-02-03

## 2023-02-03 PROBLEM — R77.8 ELEVATED TROPONIN: Status: ACTIVE | Noted: 2023-02-03

## 2023-02-03 PROBLEM — R79.89 ELEVATED TROPONIN: Status: ACTIVE | Noted: 2023-02-03

## 2023-02-03 LAB
ALBUMIN SERPL-MCNC: 3 G/DL (ref 3.4–5)
ALBUMIN/GLOB SERPL: 0.8 (ref 0.8–1.7)
ALP SERPL-CCNC: 85 U/L (ref 45–117)
ALT SERPL-CCNC: 14 U/L (ref 13–56)
ANION GAP SERPL CALC-SCNC: 5 MMOL/L (ref 3–18)
AST SERPL-CCNC: 18 U/L (ref 10–38)
BASOPHILS # BLD: 0 K/UL (ref 0–0.1)
BASOPHILS NFR BLD: 0 % (ref 0–2)
BILIRUB SERPL-MCNC: 0.6 MG/DL (ref 0.2–1)
BUN SERPL-MCNC: 26 MG/DL (ref 7–18)
BUN/CREAT SERPL: 21 (ref 12–20)
CALCIUM SERPL-MCNC: 9 MG/DL (ref 8.5–10.1)
CHLORIDE SERPL-SCNC: 107 MMOL/L (ref 100–111)
CO2 SERPL-SCNC: 24 MMOL/L (ref 21–32)
CREAT SERPL-MCNC: 1.26 MG/DL (ref 0.6–1.3)
D DIMER PPP FEU-MCNC: 11.51 UG/ML(FEU)
DIFFERENTIAL METHOD BLD: ABNORMAL
ECHO AO ASC DIAM: 3.1 CM
ECHO AO ASCENDING AORTA INDEX: 2.08 CM/M2
ECHO AO ROOT DIAM: 2.9 CM
ECHO AO ROOT INDEX: 1.95 CM/M2
ECHO AV AREA PEAK VELOCITY: 2 CM2
ECHO AV AREA VTI: 2.1 CM2
ECHO AV AREA/BSA PEAK VELOCITY: 1.3 CM2/M2
ECHO AV AREA/BSA VTI: 1.4 CM2/M2
ECHO AV MEAN GRADIENT: 6 MMHG
ECHO AV MEAN VELOCITY: 1.2 M/S
ECHO AV PEAK GRADIENT: 12 MMHG
ECHO AV PEAK VELOCITY: 1.7 M/S
ECHO AV VELOCITY RATIO: 0.94
ECHO AV VTI: 30.9 CM
ECHO EST RA PRESSURE: 3 MMHG
ECHO IVC EXP: 0.9 CM
ECHO LA DIAMETER INDEX: 2.82 CM/M2
ECHO LA DIAMETER: 4.2 CM
ECHO LA TO AORTIC ROOT RATIO: 1.45
ECHO LA VOL 2C: 46 ML (ref 22–52)
ECHO LA VOL 4C: 47 ML (ref 22–52)
ECHO LA VOL BP: 48 ML (ref 22–52)
ECHO LA VOL/BSA BIPLANE: 32 ML/M2 (ref 16–34)
ECHO LA VOLUME AREA LENGTH: 53 ML
ECHO LA VOLUME INDEX A2C: 31 ML/M2 (ref 16–34)
ECHO LA VOLUME INDEX A4C: 32 ML/M2 (ref 16–34)
ECHO LA VOLUME INDEX AREA LENGTH: 36 ML/M2 (ref 16–34)
ECHO LV E' LATERAL VELOCITY: 9 CM/S
ECHO LV E' SEPTAL VELOCITY: 7 CM/S
ECHO LV EDV A2C: 53 ML
ECHO LV EDV A4C: 83 ML
ECHO LV EDV BP: 71 ML (ref 56–104)
ECHO LV EDV INDEX A4C: 56 ML/M2
ECHO LV EDV INDEX BP: 48 ML/M2
ECHO LV EDV NDEX A2C: 36 ML/M2
ECHO LV EJECTION FRACTION A2C: 54 %
ECHO LV EJECTION FRACTION A4C: 50 %
ECHO LV EJECTION FRACTION BIPLANE: 51 % (ref 55–100)
ECHO LV ESV A2C: 24 ML
ECHO LV ESV A4C: 42 ML
ECHO LV ESV BP: 35 ML (ref 19–49)
ECHO LV ESV INDEX A2C: 16 ML/M2
ECHO LV ESV INDEX A4C: 28 ML/M2
ECHO LV ESV INDEX BP: 23 ML/M2
ECHO LV FRACTIONAL SHORTENING: 27 % (ref 28–44)
ECHO LV INTERNAL DIMENSION DIASTOLE INDEX: 2.75 CM/M2
ECHO LV INTERNAL DIMENSION DIASTOLIC: 4.1 CM (ref 3.9–5.3)
ECHO LV INTERNAL DIMENSION SYSTOLIC INDEX: 2.01 CM/M2
ECHO LV INTERNAL DIMENSION SYSTOLIC: 3 CM
ECHO LV IVSD: 0.9 CM (ref 0.6–0.9)
ECHO LV MASS 2D: 114.1 G (ref 67–162)
ECHO LV MASS INDEX 2D: 76.6 G/M2 (ref 43–95)
ECHO LV POSTERIOR WALL DIASTOLIC: 0.9 CM (ref 0.6–0.9)
ECHO LV RELATIVE WALL THICKNESS RATIO: 0.44
ECHO LVOT AREA: 2.3 CM2
ECHO LVOT AV VTI INDEX: 0.96
ECHO LVOT DIAM: 1.7 CM
ECHO LVOT MEAN GRADIENT: 5 MMHG
ECHO LVOT PEAK GRADIENT: 10 MMHG
ECHO LVOT PEAK VELOCITY: 1.6 M/S
ECHO LVOT STROKE VOLUME INDEX: 45.2 ML/M2
ECHO LVOT SV: 67.4 ML
ECHO LVOT VTI: 29.7 CM
ECHO MV A VELOCITY: 1.03 M/S
ECHO MV E DECELERATION TIME (DT): 234.3 MS
ECHO MV E VELOCITY: 1.03 M/S
ECHO MV E/A RATIO: 1
ECHO MV E/E' LATERAL: 11.44
ECHO MV E/E' RATIO (AVERAGED): 13.08
ECHO MV E/E' SEPTAL: 14.71
ECHO MV REGURGITANT VELOCITY PISA: 6 M/S
ECHO MV REGURGITANT VTIA: 163.5 CM
ECHO PULMONARY ARTERY END DIASTOLIC PRESSURE: 7 MMHG
ECHO PV MAX VELOCITY: 1.4 M/S
ECHO PV MEAN GRADIENT: 3 MMHG
ECHO PV MEAN VELOCITY: 0.9 M/S
ECHO PV PEAK GRADIENT: 8 MMHG
ECHO PV REGURGITANT MAX VELOCITY: 1.3 M/S
ECHO PVEIN PEAK D VELOCITY: 0.5 M/S
ECHO PVEIN PEAK S VELOCITY: 0.5 M/S
ECHO PVEIN S/D RATIO: 1 NO UNITS
ECHO RA VOLUME BIPLANE METHOD OF DISKS: 32 ML
ECHO RA VOLUME INDEX BP: 21 ML/M2
ECHO RIGHT VENTRICULAR SYSTOLIC PRESSURE (RVSP): 15 MMHG
ECHO RV FREE WALL PEAK S': 13 CM/S
ECHO RV INTERNAL DIMENSION: 2.8 CM
ECHO RV TAPSE: 1.7 CM (ref 1.7–?)
ECHO RVOT MEAN GRADIENT: 2 MMHG
ECHO RVOT PEAK GRADIENT: 3 MMHG
ECHO RVOT PEAK VELOCITY: 0.8 M/S
ECHO RVOT VTI: 13.6 CM
ECHO TV REGURGITANT MAX VELOCITY: 1.7 M/S
EOSINOPHIL # BLD: 0.1 K/UL (ref 0–0.4)
EOSINOPHIL NFR BLD: 1 % (ref 0–5)
ERYTHROCYTE [DISTWIDTH] IN BLOOD BY AUTOMATED COUNT: 16.1 % (ref 11.6–14.5)
GLOBULIN SER CALC-MCNC: 3.7 G/DL (ref 2–4)
GLUCOSE SERPL-MCNC: 89 MG/DL (ref 74–99)
HCT VFR BLD AUTO: 29.1 % (ref 35–45)
HGB BLD-MCNC: 9.4 G/DL (ref 12–16)
IMM GRANULOCYTES # BLD AUTO: 0 K/UL (ref 0–0.04)
IMM GRANULOCYTES NFR BLD AUTO: 1 % (ref 0–0.5)
IRON SATN MFR SERPL: 18 % (ref 20–50)
IRON SERPL-MCNC: 39 UG/DL (ref 50–175)
LYMPHOCYTES # BLD: 0.8 K/UL (ref 0.9–3.6)
LYMPHOCYTES NFR BLD: 23 % (ref 21–52)
MAGNESIUM SERPL-MCNC: 1.8 MG/DL (ref 1.6–2.6)
MCH RBC QN AUTO: 26.7 PG (ref 24–34)
MCHC RBC AUTO-ENTMCNC: 32.3 G/DL (ref 31–37)
MCV RBC AUTO: 82.7 FL (ref 78–100)
MONOCYTES # BLD: 0.2 K/UL (ref 0.05–1.2)
MONOCYTES NFR BLD: 6 % (ref 3–10)
NEUTS SEG # BLD: 2.4 K/UL (ref 1.8–8)
NEUTS SEG NFR BLD: 68 % (ref 40–73)
NRBC # BLD: 0 K/UL (ref 0–0.01)
NRBC BLD-RTO: 0 PER 100 WBC
PLATELET # BLD AUTO: 285 K/UL (ref 135–420)
PMV BLD AUTO: 9.2 FL (ref 9.2–11.8)
POTASSIUM SERPL-SCNC: 4.6 MMOL/L (ref 3.5–5.5)
PROT SERPL-MCNC: 6.7 G/DL (ref 6.4–8.2)
RBC # BLD AUTO: 3.52 M/UL (ref 4.2–5.3)
SODIUM SERPL-SCNC: 136 MMOL/L (ref 136–145)
TIBC SERPL-MCNC: 218 UG/DL (ref 250–450)
TROPONIN I SERPL HS-MCNC: 119 NG/L (ref 0–54)
TROPONIN I SERPL HS-MCNC: 82 NG/L (ref 0–54)
TROPONIN I SERPL HS-MCNC: 85 NG/L (ref 0–54)
TSH SERPL DL<=0.05 MIU/L-ACNC: 1.29 UIU/ML (ref 0.36–3.74)
WBC # BLD AUTO: 3.5 K/UL (ref 4.6–13.2)

## 2023-02-03 PROCEDURE — 83735 ASSAY OF MAGNESIUM: CPT

## 2023-02-03 PROCEDURE — G0378 HOSPITAL OBSERVATION PER HR: HCPCS

## 2023-02-03 PROCEDURE — 36415 COLL VENOUS BLD VENIPUNCTURE: CPT

## 2023-02-03 PROCEDURE — 84484 ASSAY OF TROPONIN QUANT: CPT

## 2023-02-03 PROCEDURE — 74011250637 HC RX REV CODE- 250/637: Performed by: INTERNAL MEDICINE

## 2023-02-03 PROCEDURE — 85379 FIBRIN DEGRADATION QUANT: CPT

## 2023-02-03 PROCEDURE — 93970 EXTREMITY STUDY: CPT

## 2023-02-03 PROCEDURE — 74011000250 HC RX REV CODE- 250: Performed by: INTERNAL MEDICINE

## 2023-02-03 PROCEDURE — 83540 ASSAY OF IRON: CPT

## 2023-02-03 PROCEDURE — 93306 TTE W/DOPPLER COMPLETE: CPT

## 2023-02-03 PROCEDURE — 65270000029 HC RM PRIVATE

## 2023-02-03 PROCEDURE — 85025 COMPLETE CBC W/AUTO DIFF WBC: CPT

## 2023-02-03 PROCEDURE — 74011250636 HC RX REV CODE- 250/636: Performed by: INTERNAL MEDICINE

## 2023-02-03 PROCEDURE — 80053 COMPREHEN METABOLIC PANEL: CPT

## 2023-02-03 RX ORDER — ACETAMINOPHEN 325 MG/1
650 TABLET ORAL
Status: DISCONTINUED | OUTPATIENT
Start: 2023-02-03 | End: 2023-02-06 | Stop reason: HOSPADM

## 2023-02-03 RX ORDER — POLYETHYLENE GLYCOL 3350 17 G/17G
17 POWDER, FOR SOLUTION ORAL DAILY PRN
Status: DISCONTINUED | OUTPATIENT
Start: 2023-02-03 | End: 2023-02-06 | Stop reason: HOSPADM

## 2023-02-03 RX ORDER — SODIUM CHLORIDE 0.9 % (FLUSH) 0.9 %
5-40 SYRINGE (ML) INJECTION AS NEEDED
Status: DISCONTINUED | OUTPATIENT
Start: 2023-02-03 | End: 2023-02-06 | Stop reason: HOSPADM

## 2023-02-03 RX ORDER — AMLODIPINE BESYLATE 5 MG/1
5 TABLET ORAL DAILY
Status: DISCONTINUED | OUTPATIENT
Start: 2023-02-03 | End: 2023-02-05

## 2023-02-03 RX ORDER — ATENOLOL 50 MG/1
100 TABLET ORAL DAILY
Status: DISCONTINUED | OUTPATIENT
Start: 2023-02-04 | End: 2023-02-05

## 2023-02-03 RX ORDER — CARVEDILOL 3.12 MG/1
6.25 TABLET ORAL 2 TIMES DAILY WITH MEALS
Status: DISCONTINUED | OUTPATIENT
Start: 2023-02-03 | End: 2023-02-03

## 2023-02-03 RX ORDER — SODIUM CHLORIDE 0.9 % (FLUSH) 0.9 %
5-40 SYRINGE (ML) INJECTION EVERY 8 HOURS
Status: DISCONTINUED | OUTPATIENT
Start: 2023-02-03 | End: 2023-02-06 | Stop reason: HOSPADM

## 2023-02-03 RX ORDER — ONDANSETRON 2 MG/ML
4 INJECTION INTRAMUSCULAR; INTRAVENOUS
Status: DISCONTINUED | OUTPATIENT
Start: 2023-02-03 | End: 2023-02-06 | Stop reason: HOSPADM

## 2023-02-03 RX ORDER — CARVEDILOL 3.12 MG/1
3.12 TABLET ORAL 2 TIMES DAILY WITH MEALS
Status: DISCONTINUED | OUTPATIENT
Start: 2023-02-03 | End: 2023-02-03

## 2023-02-03 RX ORDER — ADENOSINE 3 MG/ML
6 INJECTION, SOLUTION INTRAVENOUS ONCE
Status: COMPLETED | OUTPATIENT
Start: 2023-02-03 | End: 2023-02-03

## 2023-02-03 RX ORDER — LANOLIN ALCOHOL/MO/W.PET/CERES
325 CREAM (GRAM) TOPICAL 2 TIMES DAILY WITH MEALS
Status: DISCONTINUED | OUTPATIENT
Start: 2023-02-03 | End: 2023-02-06 | Stop reason: HOSPADM

## 2023-02-03 RX ORDER — ATORVASTATIN CALCIUM 20 MG/1
40 TABLET, FILM COATED ORAL
Status: DISCONTINUED | OUTPATIENT
Start: 2023-02-03 | End: 2023-02-06 | Stop reason: HOSPADM

## 2023-02-03 RX ORDER — POLYETHYLENE GLYCOL 3350 17 G/17G
17 POWDER, FOR SOLUTION ORAL DAILY
Status: DISCONTINUED | OUTPATIENT
Start: 2023-02-04 | End: 2023-02-06 | Stop reason: HOSPADM

## 2023-02-03 RX ORDER — LORAZEPAM 1 MG/1
1 TABLET ORAL
Status: DISCONTINUED | OUTPATIENT
Start: 2023-02-03 | End: 2023-02-06 | Stop reason: HOSPADM

## 2023-02-03 RX ORDER — ONDANSETRON 4 MG/1
4 TABLET, ORALLY DISINTEGRATING ORAL
Status: DISCONTINUED | OUTPATIENT
Start: 2023-02-03 | End: 2023-02-06 | Stop reason: HOSPADM

## 2023-02-03 RX ORDER — ACETAMINOPHEN 650 MG/1
650 SUPPOSITORY RECTAL
Status: DISCONTINUED | OUTPATIENT
Start: 2023-02-03 | End: 2023-02-06 | Stop reason: HOSPADM

## 2023-02-03 RX ORDER — SIMETHICONE 80 MG
80 TABLET,CHEWABLE ORAL
Status: DISCONTINUED | OUTPATIENT
Start: 2023-02-03 | End: 2023-02-06 | Stop reason: HOSPADM

## 2023-02-03 RX ADMIN — SODIUM CHLORIDE, PRESERVATIVE FREE 10 ML: 5 INJECTION INTRAVENOUS at 13:46

## 2023-02-03 RX ADMIN — ADENOSINE 6 MG: 3 INJECTION, SOLUTION INTRAVENOUS at 20:22

## 2023-02-03 RX ADMIN — ATORVASTATIN CALCIUM 40 MG: 20 TABLET, FILM COATED ORAL at 03:43

## 2023-02-03 RX ADMIN — CARVEDILOL 6.25 MG: 3.12 TABLET, FILM COATED ORAL at 17:05

## 2023-02-03 RX ADMIN — ACETAMINOPHEN 650 MG: 325 TABLET ORAL at 13:46

## 2023-02-03 RX ADMIN — FERROUS SULFATE TAB 325 MG (65 MG ELEMENTAL FE) 325 MG: 325 (65 FE) TAB at 09:56

## 2023-02-03 RX ADMIN — AMLODIPINE BESYLATE 5 MG: 5 TABLET ORAL at 09:56

## 2023-02-03 RX ADMIN — FERROUS SULFATE TAB 325 MG (65 MG ELEMENTAL FE) 325 MG: 325 (65 FE) TAB at 17:05

## 2023-02-03 RX ADMIN — ATORVASTATIN CALCIUM 40 MG: 20 TABLET, FILM COATED ORAL at 21:57

## 2023-02-03 RX ADMIN — SODIUM CHLORIDE, PRESERVATIVE FREE 10 ML: 5 INJECTION INTRAVENOUS at 21:57

## 2023-02-03 RX ADMIN — CARVEDILOL 6.25 MG: 3.12 TABLET, FILM COATED ORAL at 09:56

## 2023-02-03 RX ADMIN — ACETAMINOPHEN 650 MG: 325 TABLET ORAL at 19:54

## 2023-02-03 NOTE — PROGRESS NOTES
2/3/2023 PT note: consult received and chart reviewed. Evaluation attempted. Duplex for LEs is pending results. Will f/u at later time as pt schedule allows for PT evaluation. Thank you for this referral.   Lala Lujan, CHEMO      2/3/2023 15:35 Second Attempt: Awaiting VQ scan results to r/o PE. Will follow-up as Pt schedule allows.   Lala Lujan, SPT

## 2023-02-03 NOTE — H&P
History & Physical    Patient: Edgar Stevenson MRN: [id number]  CSN: [id number]    YOB: 1955  Age: 69 y.o. Sex: female      DOA: 2/2/2023    Chief Complaint:   Chief Complaint   Patient presents with    Allergic Reaction          HPI:     Edgar Stevenson is a 69 y.o.  female who has history of hypertension, anxiety, renal insufficiency presents to the emergency room after recently being discharged from our hospital.  Patient was admitted to the hospital with symptomatic anemia and found to have a pelvic mass that needs further evaluation. She was discharged home and had been doing well until she went up a flight of steps at home and felt weak her caretaker took her vitals and found that her heart rate was elevated and her blood pressure was high so she was taken back to the emergency room. In the emergency room she was found to be in SVT she was given 6 of adenosine and converted a troponin was ordered which was normal and repeat troponin showed doubling she was trended and there was increasing troponin so was thought she should be admitted for further evaluation. Patient requires  history is obtained through interpreting service provided to our hospital patient complains of intermittent abdominal pain that feels gas-like in nature she wonders if this is from her tumor    Past Medical History:   Diagnosis Date    Arrhythmia     \"my heart beats fast\" \" I am awaiting results from Cardiologist\"    Hypertension     Psychiatric disorder     anxiety       No past surgical history on file. No family history on file. Social History     Socioeconomic History    Marital status:    Tobacco Use    Smoking status: Never    Smokeless tobacco: Never   Vaping Use    Vaping Use: Never used   Substance and Sexual Activity    Alcohol use: No    Drug use: Never       Prior to Admission medications    Medication Sig Start Date End Date Taking?  Authorizing Provider   carvediloL (COREG) 3.125 mg tablet Take 1 Tablet by mouth two (2) times daily (with meals). 23   Henry Strange MD   amLODIPine (NORVASC) 5 mg tablet Take 1 Tablet by mouth daily. 23   Henry Strange MD   ferrous sulfate 325 mg (65 mg iron) tablet Take 1 Tablet by mouth two (2) times daily (with meals). 23   Henry Strange MD   LORazepam (ATIVAN) 1 mg tablet Take 1 mg by mouth every four (4) hours as needed for Anxiety. Provider, Historical   atorvastatin (LIPITOR) 40 mg tablet Take 40 mg by mouth nightly. Provider, Historical       No Known Allergies      Review of Systems  GENERAL: Patient alert, awake and oriented times 3, able to communicate full sentences and not in distress. HEENT: No change in vision, no earache, tinnitus, sore throat or sinus congestion. NECK: No pain or stiffness. PULMONARY: No shortness of breath, cough or wheeze. Cardiovascular: no pnd or orthopnea, n+P  GASTROINTESTINAL: +abdominal pain, nausea, vomiting or diarrhea, melena or bright red blood per rectum. GENITOURINARY: No urinary frequency, urgency, hesitancy or dysuria. MUSCULOSKELETAL: No joint or muscle pain, no back pain, no recent trauma. DERMATOLOGIC: No rash, no itching, no lesions. ENDOCRINE: No polyuria, polydipsia, no heat or cold intolerance. No recent change in weight. HEMATOLOGICAL: No anemia or easy bruising or bleeding. NEUROLOGIC: No headache, seizures, numbness, tingling or weakness. Physical Exam:     Physical Exam:  Visit Vitals  /71 (BP 1 Location: Left upper arm, BP Patient Position: Sitting)   Pulse (!) 176   Resp 18   Ht 4' 11\" (1.499 m)   Wt 55.3 kg (122 lb)   SpO2 99%   BMI 24.64 kg/m²      O2 Device: None (Room air)    Temp (24hrs), Av °F (36.7 °C), Min:97.5 °F (36.4 °C), Max:98.7 °F (37.1 °C)    No intake/output data recorded. No intake/output data recorded. General:  Alert, cooperative, no distress, appears stated age.               Head: Normocephalic, without obvious abnormality, atraumatic. Eyes:  Conjunctivae/corneas clear. PERRL, EOMs intact. Nose: Nares normal. No drainage or sinus tenderness. Neck: Supple, symmetrical, trachea midline, no adenopathy, thyroid: no enlargement, no carotid bruit and no JVD. Lungs:   Clear to auscultation bilaterally. Heart:  Regular rate and rhythm, S1, S2 normal.     Abdomen: Soft, non-tender. Bowel sounds normal.    Extremities: Extremities normal, atraumatic, no cyanosis or edema. Pulses: 2+ and symmetric all extremities. Skin:  No rashes or lesions   Neurologic: AAOx3, No focal motor or sensory deficit. Labs Reviewed: All lab results for the last 24 hours reviewed. and EKG    Procedures/imaging: see electronic medical records for all procedures/Xrays and details which were not copied into this note but were reviewed prior to creation of Plan      Assessment/Plan     Active Problems:  1. Troponin elevation  These were trended  She is admitted to telemetry  An echocardiogram was ordered  Coreg will be increased  the stress test but this could be from demand ischemia  Cardiology was consulted in the emergency room    2. SVT  We will check a TSH  A VQ scan  As well as a duplex  Increase her Coreg previously was on 100 mg atenolol    3. Symptomatic anemia trend for now  Transfuse to keep hemoglobin over 7 and eliminate demand ischemia    4. Pelvic mass  Likely cancer is pending outpatient gynecological work-up    5. QUINN   continue to hold her losartan  Fluids as needed    6.   Hypertension  Continue amlodipine at 5mg  DVT/GI Prophylaxis: Hep SQ        Ashlie Singh MD  2/2/2023 11:58 PM

## 2023-02-03 NOTE — ED NOTES
Pt given new med today (carvedilol) and is now experiencing rapid HR. EKG shows 163 @ this time. Pt denies CP and SOB.

## 2023-02-03 NOTE — ED PROVIDER NOTES
THE [facility] [address] [facility]       Pt Name: Shannon Scruggs  MRN: [id number]  [place] 1955  Date of evaluation: 2/2/2023  Provider: Yashira Kidd MD   PCP: Michelle Lockwood MD  Note Started: 8:26 PM 2/2/23     CHIEF COMPLAINT       Chief Complaint   Patient presents with    Allergic Reaction        HISTORY OF PRESENT ILLNESS: 1 or more elements      History From: Patient and Patient's Daughter  HPI Limitations : Language Barrier (Daughter acting as )     Shannon Scruggs is a 69 y.o. female who presents the emergency department tonight because of palpitations. She was just discharged from the hospital where she was admitted for symptomatic anemia. She was given a unit of packed red blood cells for hemoglobin of 6.2. She was also found to have a pelvic mass that needs to be followed up with OB/GYN; it was thought to be a low-grade neoplasm either ovarian or uterine, physiologic cyst or an endometrioma. This evening, she was going up some stairs and she had shortness of breath and chest tightness, and about 5 minutes later she took her carvedilol and amlodipine. The chest tightness persisted, so her daughter brought her to the emergency department. At present, she has minimal pain and pelvic pain, no shortness of breath at rest, and no nausea. Nursing Notes were all reviewed and agreed with or any disagreements were addressed in the HPI. REVIEW OF SYSTEMS      Review of Systems     Positives and Pertinent negatives as per HPI. PAST HISTORY     Past Medical History:  Past Medical History:   Diagnosis Date    Arrhythmia     \"my heart beats fast\" \" I am awaiting results from Cardiologist\"    Hypertension     Psychiatric disorder     anxiety       Past Surgical History:  No past surgical history on file. Family History:  No family history on file.     Social History:  Social History     Tobacco Use    Smoking status: Never    Smokeless tobacco: Never Vaping Use    Vaping Use: Never used   Substance Use Topics    Alcohol use: No    Drug use: Never       Allergies:  No Known Allergies    CURRENT MEDICATIONS      Current Discharge Medication List        CONTINUE these medications which have NOT CHANGED    Details   carvediloL (COREG) 3.125 mg tablet Take 1 Tablet by mouth two (2) times daily (with meals). Qty: 60 Tablet, Refills: 0      amLODIPine (NORVASC) 5 mg tablet Take 1 Tablet by mouth daily. Qty: 30 Tablet, Refills: 0      atorvastatin (LIPITOR) 40 mg tablet Take 40 mg by mouth nightly. ferrous sulfate 325 mg (65 mg iron) tablet Take 1 Tablet by mouth two (2) times daily (with meals). Qty: 60 Tablet, Refills: 0      LORazepam (ATIVAN) 1 mg tablet Take 1 mg by mouth every four (4) hours as needed for Anxiety. SCREENINGS               No data recorded         PHYSICAL EXAM      ED Triage Vitals [02/02/23 1949]   ED Encounter Vitals Group      /71      Pulse (Heart Rate) (!) 176      Resp Rate 18      Temp       Temp src       O2 Sat (%) 99 %      Weight 122 lb      Height 4' 11\"        Physical Exam  Vitals reviewed. Constitutional:       General: She is not in acute distress. Appearance: She is well-developed. She is not diaphoretic. HENT:      Head: Normocephalic and atraumatic. Right Ear: External ear normal.      Left Ear: External ear normal.      Nose: Nose normal.   Eyes:      General: No scleral icterus. Right eye: No discharge. Left eye: No discharge. Conjunctiva/sclera: Conjunctivae normal.      Pupils: Pupils are equal, round, and reactive to light. Neck:      Thyroid: No thyromegaly. Trachea: No tracheal deviation. Cardiovascular:      Rate and Rhythm: Regular rhythm. Tachycardia present. Heart sounds: Normal heart sounds. No murmur heard. No friction rub. No gallop. Pulmonary:      Effort: Pulmonary effort is normal. No respiratory distress.       Breath sounds: Normal breath sounds. No wheezing or rales. Abdominal:      General: Bowel sounds are normal. There is no distension. Palpations: Abdomen is soft. Tenderness: There is no abdominal tenderness. There is no guarding or rebound. Musculoskeletal:         General: No tenderness or deformity. Normal range of motion. Cervical back: Normal range of motion and neck supple. Skin:     General: Skin is warm and dry. Neurological:      Mental Status: She is alert and oriented to person, place, and time. Mental status is at baseline. Deep Tendon Reflexes: Reflexes are normal and symmetric. Psychiatric:         Behavior: Behavior normal.          DIAGNOSTIC RESULTS   LABS:       Recent Results (from the past 12 hour(s))   EKG, 12 LEAD, INITIAL    Collection Time: 02/02/23  7:51 PM   Result Value Ref Range    Ventricular Rate 163 BPM    QRS Duration 82 ms    Q-T Interval 268 ms    QTC Calculation (Bezet) 441 ms    Calculated R Axis 102 degrees    Diagnosis       Critical Test Result: High HR  Supraventricular tachycardia  Rightward axis  Nonspecific ST abnormality  Abnormal QRS-T angle, consider primary T wave abnormality  Abnormal ECG  When compared with ECG of 30-JAN-2023 19:11,  Vent. rate has increased BY  92 BPM  T wave inversion now evident in Inferior leads     CBC WITH AUTOMATED DIFF    Collection Time: 02/02/23  8:15 PM   Result Value Ref Range    WBC 5.9 4.6 - 13.2 K/uL    RBC 3.99 (L) 4.20 - 5.30 M/uL    HGB 10.5 (L) 12.0 - 16.0 g/dL    HCT 32.5 (L) 35.0 - 45.0 %    MCV 81.5 78.0 - 100.0 FL    MCH 26.3 24.0 - 34.0 PG    MCHC 32.3 31.0 - 37.0 g/dL    RDW 16.0 (H) 11.6 - 14.5 %    PLATELET 399 504 - 087 K/uL    MPV 9.6 9.2 - 11.8 FL    NRBC 0.0 0  WBC    ABSOLUTE NRBC 0.00 0.00 - 0.01 K/uL    NEUTROPHILS 79 (H) 40 - 73 %    LYMPHOCYTES 14 (L) 21 - 52 %    MONOCYTES 6 3 - 10 %    EOSINOPHILS 1 0 - 5 %    BASOPHILS 0 0 - 2 %    IMMATURE GRANULOCYTES 1 (H) 0.0 - 0.5 %    ABS.  NEUTROPHILS 4.6 1.8 - 8.0 K/UL    ABS. LYMPHOCYTES 0.8 (L) 0.9 - 3.6 K/UL    ABS. MONOCYTES 0.3 0.05 - 1.2 K/UL    ABS. EOSINOPHILS 0.1 0.0 - 0.4 K/UL    ABS. BASOPHILS 0.0 0.0 - 0.1 K/UL    ABS. IMM. GRANS. 0.0 0.00 - 0.04 K/UL    DF AUTOMATED     METABOLIC PANEL, COMPREHENSIVE    Collection Time: 02/02/23  8:15 PM   Result Value Ref Range    Sodium 135 (L) 136 - 145 mmol/L    Potassium 4.8 3.5 - 5.5 mmol/L    Chloride 105 100 - 111 mmol/L    CO2 20 (L) 21 - 32 mmol/L    Anion gap 10 3.0 - 18 mmol/L    Glucose 129 (H) 74 - 99 mg/dL    BUN 26 (H) 7.0 - 18 MG/DL    Creatinine 1.42 (H) 0.6 - 1.3 MG/DL    BUN/Creatinine ratio 18 12 - 20      eGFR 40 (L) >60 ml/min/1.73m2    Calcium 9.2 8.5 - 10.1 MG/DL    Bilirubin, total 0.5 0.2 - 1.0 MG/DL    ALT (SGPT) 17 13 - 56 U/L    AST (SGOT) 26 10 - 38 U/L    Alk. phosphatase 95 45 - 117 U/L    Protein, total 7.8 6.4 - 8.2 g/dL    Albumin 3.4 3.4 - 5.0 g/dL    Globulin 4.4 (H) 2.0 - 4.0 g/dL    A-G Ratio 0.8 0.8 - 1.7     MAGNESIUM    Collection Time: 02/02/23  8:15 PM   Result Value Ref Range    Magnesium 1.9 1.6 - 2.6 mg/dL   TROPONIN-HIGH SENSITIVITY    Collection Time: 02/02/23  8:15 PM   Result Value Ref Range    Troponin-High Sensitivity 32 0 - 54 ng/L   TROPONIN-HIGH SENSITIVITY    Collection Time: 02/02/23 10:35 PM   Result Value Ref Range    Troponin-High Sensitivity 59 (H) 0 - 54 ng/L   TSH 3RD GENERATION    Collection Time: 02/02/23 10:35 PM   Result Value Ref Range    TSH 1.29 0.36 - 3.74 uIU/mL   EKG, 12 LEAD, INITIAL    Collection Time: 02/02/23 11:33 PM   Result Value Ref Range    Ventricular Rate 89 BPM    Atrial Rate 89 BPM    P-R Interval 158 ms    QRS Duration 78 ms    Q-T Interval 356 ms    QTC Calculation (Bezet) 433 ms    Calculated P Axis 59 degrees    Calculated R Axis 84 degrees    Calculated T Axis 48 degrees    Diagnosis       Normal sinus rhythm  Septal infarct , age undetermined  Abnormal ECG  When compared with ECG of 02-FEB-2023 19:51,  Vent.  rate has decreased BY 74 BPM  Nonspecific T wave abnormality now evident in Anterior leads     TROPONIN-HIGH SENSITIVITY    Collection Time: 02/03/23 12:00 AM   Result Value Ref Range    Troponin-High Sensitivity 82 (H) 0 - 54 ng/L        EKG: SVT, , Nonspecific ST abnormalities. Interpreted by ED provider Dr Joselito Lynch MD     RADIOLOGY:  Non-plain film images such as CT, Ultrasound and MRI are read by the radiologist. Plain radiographic images are visualized and preliminarily interpreted by the ED Provider with the below findings:        Interpretation per the Radiologist below, if available at the time of this note:     No results found. PROCEDURES   Unless otherwise noted below, none  Procedures     CRITICAL CARE TIME   CRITICAL CARE NOTE :    IMPENDING DETERIORATION -Cardiovascular  ASSOCIATED RISK FACTORS - Hypotension, Shock, and Dysrhythmia  MANAGEMENT- Bedside Assessment and Supervision of Care  INTERPRETATION -  Blood Pressure  INTERVENTIONS - hemodynamic mngmt  CASE REVIEW - Hospitalist/Intensivist  TREATMENT RESPONSE -Improved  PERFORMED BY - Self    NOTES   :  I have spent 35 minutes of critical care time involved in lab review, consultations with specialist, family decision- making, bedside attention and documentation; time exculsive of EKG review/interpretation. During this entire length of time I was immediately available to the patient.   Lance Mccormick MD      EMERGENCY DEPARTMENT COURSE and DIFFERENTIAL DIAGNOSIS/MDM   Vitals:    Vitals:    02/03/23 0050 02/03/23 0115 02/03/23 0120 02/03/23 0200   BP: (!) 145/69 (!) 171/82 (!) 150/71 (!) 150/62   Pulse: 88 97 95 95   Resp: 22 27 30 20   Temp:    98.8 °F (37.1 °C)   SpO2: 90% 92% 95% 95%   Weight:       Height:            Patient was given the following medications:  Medications   adenosine (ADENOCARD) injection 12 mg ( IntraVENous Canceled Entry 2/2/23 2028)   sodium chloride (NS) flush 5-40 mL (has no administration in time range)   sodium chloride (NS) flush 5-40 mL (has no administration in time range)   acetaminophen (TYLENOL) tablet 650 mg (has no administration in time range)     Or   acetaminophen (TYLENOL) suppository 650 mg (has no administration in time range)   polyethylene glycol (MIRALAX) packet 17 g (has no administration in time range)   ondansetron (ZOFRAN ODT) tablet 4 mg (has no administration in time range)     Or   ondansetron (ZOFRAN) injection 4 mg (has no administration in time range)   carvediloL (COREG) tablet 3.125 mg (has no administration in time range)   LORazepam (ATIVAN) tablet 1 mg (has no administration in time range)   atorvastatin (LIPITOR) tablet 40 mg (40 mg Oral Given 2/3/23 0343)   amLODIPine (NORVASC) tablet 5 mg (has no administration in time range)   ferrous sulfate tablet 325 mg (has no administration in time range)   sodium chloride 0.9 % bolus infusion 1,000 mL (1,000 mL IntraVENous New Bag 2/2/23 2050)   adenosine (ADENOCARD) injection 6 mg (6 mg IntraVENous Given 2/2/23 2044)   metoprolol (LOPRESSOR) injection 5 mg (5 mg IntraVENous Given 2/2/23 2139)       CONSULTS: (Who and What was discussed)  IP CONSULT TO HOSPITALIST  IP CONSULT TO CARDIOLOGY    Chronic Conditions: none    Social Determinants affecting Dx or Tx:  British speaking     Records Reviewed (source and summary of external notes): Prior medical records, Previous Radiology studies, Previous Laboratory studies, Previous EKGs, and Nursing notes    MEDICAL DECISION MAKING:     CC/HPI Summary, DDx, ED Course, and Reassessment: On admission to the emergency department, the patient was found to have significant tachycardia to 186. She denied chest pain, but she had complained of palpitations. She actually looked quite good on exam with her tachycardia, found to be supraventricular. Her good response to adenosine brought the heart rate down to 110, which came down to 100, down to 85 after 5 mg metoprolol IV.      The concern, however was her troponin, which started at 32 ng/dl, and then increased to 61, and then 82. Although the patient denied chest pain, her kidney function is decreased, since her GFR is 40. It is possible that the tachycardia may have caused an increased  troponin. ED Course as of 02/03/23 0438   Thu Feb 02, 2023   2355 Case discussed with Dr Edu Mcqueen, who feels that the patient could go home if the third troponin is the same or lower than the second. [VG]   Fri Feb 03, 2023   0110 Troponin up to 82 from 59. Will need to be admitted to tele. [VG]      ED Course User Index  [VG] Joshua Bhakta MD       Disposition Considerations (Tests not done, Shared Decision Making, Pt Expectation of Test or Tx.): Discussion with patient, daughter Margot Hernández, and son regarding admission. After the case was discussed with Dr. Edu Mcqueen, he gave the option for admission without doing the third troponin. Margot Hernández felt that waiting for the results of the third troponin would be best.     FINAL IMPRESSION     1. SVT (supraventricular tachycardia) ([address]          DISPOSITION/PLAN   Admitted    Admit Note: Pt is being admitted by hospitalist service. The results of their tests and reason(s) for their admission have been discussed with pt and/or available family. They convey agreement and understanding for the need to be admitted and for the admission diagnosis. PATIENT REFERRED TO:  Follow-up Information    None           DISCHARGE MEDICATIONS:  Current Discharge Medication List            DISCONTINUED MEDICATIONS:  Current Discharge Medication List        STOP taking these medications       losartan-hydroCHLOROthiazide (HYZAAR) 100-25 mg per tablet Comments:   Reason for Stopping:         hydrALAZINE (APRESOLINE) 25 mg tablet Comments:   Reason for Stopping:         atenolol (TENORMIN) 100 mg tablet Comments:   Reason for Stopping:               I am the Primary Clinician of Record.    Mary Lou Landry MD (electronically signed)    (Please note that parts of this dictation were completed with voice recognition software. Quite often unanticipated grammatical, syntax, homophones, and other interpretive errors are inadvertently transcribed by the computer software. Please disregards these errors.  Please excuse any errors that have escaped final proofreading.)

## 2023-02-03 NOTE — PROGRESS NOTES
0200: Patient transferred from ED with ED RN present to assist. Son present. Patient assessed and in stable condition. Patient orientated to room, unit and call bell. Welcome package given. Bedside and Verbal shift change report given to Chio Whalen (oncoming nurse) by Mirtha Machado (offgoing nurse). Report included the following information SBAR, Kardex, and MAR.

## 2023-02-03 NOTE — PROGRESS NOTES
Problem: Falls - Risk of  Goal: *Absence of Falls  Description: Document Michela Mast Fall Risk and appropriate interventions in the flowsheet.   Outcome: Progressing Towards Goal  Note: Fall Risk Interventions:            Medication Interventions: Assess postural VS orthostatic hypotension, Evaluate medications/consider consulting pharmacy, Patient to call before getting OOB, Teach patient to arise slowly                   Problem: Patient Education: Go to Patient Education Activity  Goal: Patient/Family Education  Outcome: Progressing Towards Goal     Problem: Patient Education: Go to Patient Education Activity  Goal: Patient/Family Education  Outcome: Progressing Towards Goal     Problem: Unstable angina/NSTEMI: Day of Admission/Day 1  Goal: Diagnostic Test/Procedures  Outcome: Progressing Towards Goal  Goal: Medications  Outcome: Progressing Towards Goal     Problem: Anemia Care Plan (Adult and Pediatric)  Goal: *Labs within defined limits  Outcome: Progressing Towards Goal  Goal: *Tolerates increased activity  Outcome: Progressing Towards Goal     Problem: Patient Education: Go to Patient Education Activity  Goal: Patient/Family Education  Outcome: Progressing Towards Goal

## 2023-02-03 NOTE — PROGRESS NOTES
Hospitalist Progress Note    Patient: Tc German MRN: [id number]  CSN: [id number]    YOB: 1955  Age: 69 y.o. Sex: female    DOA: 2/2/2023 LOS:  LOS: 0 days          Chief Complaint:    palpitations      Assessment/Plan   68 yo female with recent admisison for anemia, pelvic mass    Came back to ER with palp, Chest tightness, SOB    1. Troponin elevation  Elevated D Dimer    Tele monitoring  Echo  VQ pending  Duplex of legs neg for DVT  Continue her coreg and norvasc     2. SVT  VQ scan  Increase her Coreg previously was on 100 mg atenolol     3. Symptomatic anemia , iron def  Continue PO iron  Transfuse to keep hemoglobin over 7 as needed     4. Pelvic mass  Likely cancer- is pending outpatient gynecological work-up-has GYN-onc appt coming up        5. Hypertension  Continue amlodipine at 5mg    Patient preferred her son interpret rather than  service in ipad/room    Son at bedside agreed to translate    Follow results    Cardiology consult        Disposition :  Patient Active Problem List   Diagnosis Code    Hypomagnesemia E83.42    Arrhythmia I49.9    Symptomatic anemia D64.9    QUINN (acute kidney injury) ([facility] [address] N17.9    Mass of pelvis R19.00    HTN (hypertension) I10    Elevated troponin R77.8    SVT (supraventricular tachycardia) ([facility]) I47.1       Subjective:    Feels better today  Denies CP, SOB, palp    Review of systems:    Constitutional: denies fevers, chills  Respiratory: denies SOB, cough  Cardiovascular: denies chest pain, palpitations  Gastrointestinal: denies nausea      Vital signs/Intake and Output:  Visit Vitals  BP (!) 150/62   Pulse 95   Temp 98.8 °F (37.1 °C)   Resp 20   Ht 4' 11\" (1.499 m)   Wt 55.3 kg (122 lb)   SpO2 95%   BMI 24.64 kg/m²     Current Shift:  No intake/output data recorded. Last three shifts:  No intake/output data recorded.     Exam:    General: Well developed, alert, NAD, OX3  CVS:Regular rate and rhythm, no M/R/G, S1/S2 heard, no thrill  Lungs:Clear to auscultation bilaterally, no wheezes, rhonchi, or rales  Abdomen: Soft, Nontender, No distention, Normal Bowel sounds  Extremities: No C/C/E, pulses palpable 2+  Neuro:grossly normal , follows commands  Psych:appropriate                Labs: Results:       Chemistry Recent Labs     02/03/23 0621 02/02/23 2015 02/02/23  0254   GLU 89 129* 97    135* 138   K 4.6 4.8 5.0    105 111   CO2 24 20* 22   BUN 26* 26* 29*   CREA 1.26 1.42* 1.34*   CA 9.0 9.2 8.6   AGAP 5 10 5   BUCR 21* 18 22*   AP 85 95  --    TP 6.7 7.8  --    ALB 3.0* 3.4  --    GLOB 3.7 4.4*  --    AGRAT 0.8 0.8  --       CBC w/Diff Recent Labs     02/03/23 0621 02/02/23 2015 02/02/23  0254   WBC 3.5* 5.9 4.2*   RBC 3.52* 3.99* 3.39*   HGB 9.4* 10.5* 9.0*   HCT 29.1* 32.5* 28.3*    344 320   GRANS 68 79*  --    LYMPH 23 14*  --    EOS 1 1  --       Cardiac Enzymes No results for input(s): CPK, CKND1, ASHELY in the last 72 hours. No lab exists for component: CKRMB, TROIP   Coagulation No results for input(s): PTP, INR, APTT, INREXT in the last 72 hours. Lipid Panel Lab Results   Component Value Date/Time    Cholesterol, total 193 09/19/2018 05:00 AM    HDL Cholesterol 14 (L) 09/19/2018 05:00 AM    LDL, calculated 138.8 (H) 09/19/2018 05:00 AM    VLDL, calculated 40.2 09/19/2018 05:00 AM    Triglyceride 201 (H) 09/19/2018 05:00 AM    CHOL/HDL Ratio 13.8 (H) 09/19/2018 05:00 AM      BNP No results for input(s): BNPP in the last 72 hours.    Liver Enzymes Recent Labs     02/03/23 0621   TP 6.7   ALB 3.0*   AP 85      Thyroid Studies Lab Results   Component Value Date/Time    TSH 1.29 02/02/2023 10:35 PM        Procedures/imaging: see electronic medical records for all procedures/Xrays and details which were not copied into this note but were reviewed prior to creation of Kameron Payne MD

## 2023-02-03 NOTE — PROGRESS NOTES
Pt readmitted under observation with in 24 hours due to an allergic reaction. Per H&P \"Kath Jacob is a 69 y.o.  female who has history of hypertension, anxiety, renal insufficiency presents to the emergency room after recently being discharged from our hospital.  Patient was admitted to the hospital with symptomatic anemia and found to have a pelvic mass that needs further evaluation. She was discharged home and had been doing well until she went up a flight of steps at home and felt weak her caretaker took her vitals and found that her heart rate was elevated and her blood pressure was high so she was taken back to the emergency room. In the emergency room she was found to be in SVT she was given 6 of adenosine and converted a troponin was ordered which was normal and repeat troponin showed doubling she was trended and there was increasing troponin so was thought she should be admitted for further evaluation. Patient requires  history is obtained through interpreting service provided to our hospital patient complains of intermittent abdominal pain that feels gas-like in nature she wonders if this is from her tumor. \"    Please encourage ambulation as appropriate as pt has cleared therapy in the past.  CM attempted to contact pt's daughter, Shell [place] ([phone]). CM unable to leave a message.  CM to continue to follow and assist.      Care Management Interventions  Support Systems: Child(ty)  Discharge Location  Patient Expects to be Discharged to[de-identified] Home

## 2023-02-04 LAB
ALBUMIN SERPL-MCNC: 3 G/DL (ref 3.4–5)
ALBUMIN/GLOB SERPL: 0.8 (ref 0.8–1.7)
ALP SERPL-CCNC: 85 U/L (ref 45–117)
ALT SERPL-CCNC: 16 U/L (ref 13–56)
ANION GAP SERPL CALC-SCNC: 7 MMOL/L (ref 3–18)
AST SERPL-CCNC: 17 U/L (ref 10–38)
BASOPHILS # BLD: 0 K/UL (ref 0–0.1)
BASOPHILS NFR BLD: 0 % (ref 0–2)
BILIRUB SERPL-MCNC: 0.5 MG/DL (ref 0.2–1)
BUN SERPL-MCNC: 34 MG/DL (ref 7–18)
BUN/CREAT SERPL: 22 (ref 12–20)
CALCIUM SERPL-MCNC: 8.8 MG/DL (ref 8.5–10.1)
CHLORIDE SERPL-SCNC: 106 MMOL/L (ref 100–111)
CO2 SERPL-SCNC: 22 MMOL/L (ref 21–32)
CREAT SERPL-MCNC: 1.53 MG/DL (ref 0.6–1.3)
DIFFERENTIAL METHOD BLD: ABNORMAL
EOSINOPHIL # BLD: 0.1 K/UL (ref 0–0.4)
EOSINOPHIL NFR BLD: 2 % (ref 0–5)
ERYTHROCYTE [DISTWIDTH] IN BLOOD BY AUTOMATED COUNT: 15.9 % (ref 11.6–14.5)
GLOBULIN SER CALC-MCNC: 3.7 G/DL (ref 2–4)
GLUCOSE SERPL-MCNC: 130 MG/DL (ref 74–99)
HCT VFR BLD AUTO: 28.7 % (ref 35–45)
HGB BLD-MCNC: 9.3 G/DL (ref 12–16)
IMM GRANULOCYTES # BLD AUTO: 0 K/UL (ref 0–0.04)
IMM GRANULOCYTES NFR BLD AUTO: 1 % (ref 0–0.5)
LYMPHOCYTES # BLD: 0.8 K/UL (ref 0.9–3.6)
LYMPHOCYTES NFR BLD: 18 % (ref 21–52)
MAGNESIUM SERPL-MCNC: 1.8 MG/DL (ref 1.6–2.6)
MCH RBC QN AUTO: 26.6 PG (ref 24–34)
MCHC RBC AUTO-ENTMCNC: 32.4 G/DL (ref 31–37)
MCV RBC AUTO: 82 FL (ref 78–100)
MONOCYTES # BLD: 0.3 K/UL (ref 0.05–1.2)
MONOCYTES NFR BLD: 7 % (ref 3–10)
NEUTS SEG # BLD: 3.1 K/UL (ref 1.8–8)
NEUTS SEG NFR BLD: 73 % (ref 40–73)
NRBC # BLD: 0 K/UL (ref 0–0.01)
NRBC BLD-RTO: 0 PER 100 WBC
PLATELET # BLD AUTO: 272 K/UL (ref 135–420)
PMV BLD AUTO: 9.5 FL (ref 9.2–11.8)
POTASSIUM SERPL-SCNC: 4.7 MMOL/L (ref 3.5–5.5)
PROT SERPL-MCNC: 6.7 G/DL (ref 6.4–8.2)
RBC # BLD AUTO: 3.5 M/UL (ref 4.2–5.3)
SODIUM SERPL-SCNC: 135 MMOL/L (ref 136–145)
TROPONIN I SERPL HS-MCNC: 47 NG/L (ref 0–54)
WBC # BLD AUTO: 4.3 K/UL (ref 4.6–13.2)

## 2023-02-04 PROCEDURE — G0378 HOSPITAL OBSERVATION PER HR: HCPCS

## 2023-02-04 PROCEDURE — 74011250637 HC RX REV CODE- 250/637: Performed by: INTERNAL MEDICINE

## 2023-02-04 PROCEDURE — 97161 PT EVAL LOW COMPLEX 20 MIN: CPT

## 2023-02-04 PROCEDURE — 84484 ASSAY OF TROPONIN QUANT: CPT

## 2023-02-04 PROCEDURE — 74011000250 HC RX REV CODE- 250: Performed by: INTERNAL MEDICINE

## 2023-02-04 PROCEDURE — 74011250637 HC RX REV CODE- 250/637: Performed by: FAMILY MEDICINE

## 2023-02-04 PROCEDURE — 83735 ASSAY OF MAGNESIUM: CPT

## 2023-02-04 PROCEDURE — 85025 COMPLETE CBC W/AUTO DIFF WBC: CPT

## 2023-02-04 PROCEDURE — 80053 COMPREHEN METABOLIC PANEL: CPT

## 2023-02-04 PROCEDURE — 36415 COLL VENOUS BLD VENIPUNCTURE: CPT

## 2023-02-04 PROCEDURE — 65270000029 HC RM PRIVATE

## 2023-02-04 PROCEDURE — 74011250637 HC RX REV CODE- 250/637: Performed by: HOSPITALIST

## 2023-02-04 RX ORDER — OXYCODONE HYDROCHLORIDE 5 MG/1
10 TABLET ORAL
Status: DISCONTINUED | OUTPATIENT
Start: 2023-02-04 | End: 2023-02-04

## 2023-02-04 RX ORDER — ADENOSINE 3 MG/ML
INJECTION, SOLUTION INTRAVENOUS
Status: COMPLETED | OUTPATIENT
Start: 2023-02-03 | End: 2023-02-03

## 2023-02-04 RX ORDER — OXYCODONE HYDROCHLORIDE 5 MG/1
5 TABLET ORAL
Status: DISCONTINUED | OUTPATIENT
Start: 2023-02-04 | End: 2023-02-06 | Stop reason: HOSPADM

## 2023-02-04 RX ADMIN — ACETAMINOPHEN 650 MG: 325 TABLET ORAL at 04:47

## 2023-02-04 RX ADMIN — AMLODIPINE BESYLATE 5 MG: 5 TABLET ORAL at 08:59

## 2023-02-04 RX ADMIN — POLYETHYLENE GLYCOL 3350 17 G: 17 POWDER, FOR SOLUTION ORAL at 08:59

## 2023-02-04 RX ADMIN — OXYCODONE HYDROCHLORIDE 10 MG: 5 TABLET ORAL at 22:16

## 2023-02-04 RX ADMIN — FERROUS SULFATE TAB 325 MG (65 MG ELEMENTAL FE) 325 MG: 325 (65 FE) TAB at 17:18

## 2023-02-04 RX ADMIN — SODIUM CHLORIDE, PRESERVATIVE FREE 10 ML: 5 INJECTION INTRAVENOUS at 14:00

## 2023-02-04 RX ADMIN — FERROUS SULFATE TAB 325 MG (65 MG ELEMENTAL FE) 325 MG: 325 (65 FE) TAB at 08:59

## 2023-02-04 RX ADMIN — ATENOLOL 100 MG: 50 TABLET ORAL at 08:59

## 2023-02-04 RX ADMIN — ACETAMINOPHEN 650 MG: 325 TABLET ORAL at 21:09

## 2023-02-04 RX ADMIN — ACETAMINOPHEN 650 MG: 325 TABLET ORAL at 13:51

## 2023-02-04 RX ADMIN — ATORVASTATIN CALCIUM 40 MG: 20 TABLET, FILM COATED ORAL at 22:16

## 2023-02-04 RX ADMIN — SODIUM CHLORIDE, PRESERVATIVE FREE 10 ML: 5 INJECTION INTRAVENOUS at 21:10

## 2023-02-04 NOTE — PROGRESS NOTES
0730 Report given to this nurse by Melo Cary, [address] [facility] Patient ambulating in hallway with family per MD instructions. Will monitor HR.

## 2023-02-04 NOTE — PROGRESS NOTES
Hospitalist Progress Note    Patient: Gabe Hooper MRN: [id number]  CSN: [id number]    YOB: 1955  Age: 69 y.o. Sex: female    DOA: 2/2/2023 LOS:  LOS: 0 days            Patient Active Problem List   Diagnosis Code    Hypomagnesemia E83.42    Arrhythmia I49.9    Symptomatic anemia D64.9    QUINN (acute kidney injury) ([facility] [address] N17.9    Mass of pelvis R19.00    HTN (hypertension) I10    Elevated troponin R77.8    SVT (supraventricular tachycardia) ([facility] [address] I47.1        IMPRESSION and Plan:    Gabe Hooper is a 69 y.o. female with   Patient Active Problem List    Diagnosis Date Noted    Elevated troponin 02/03/2023    SVT (supraventricular tachycardia) ([facility] [address] 02/03/2023    QUINN (acute kidney injury) ([facility] [address] 01/31/2023    Mass of pelvis 01/31/2023    HTN (hypertension) 01/31/2023    Symptomatic anemia 01/30/2023    Arrhythmia 03/11/2021    Hypomagnesemia 09/18/2018     Principal Problem:    Elevated troponin (2/3/2023)    Active Problems:    SVT (supraventricular tachycardia) ([address] (2/3/2023)        Came back to ER with palp, Chest tightness, SOB     1. Troponin elevation  Elevated D Dimer -- V/q low prob     Tele monitoring  Echoreviewed  VQ pending      2. SVT--- recurrent. Sp IV adenosine. Nm, mg and k. Continue to monitor     3. Symptomatic anemia , iron def  Continue PO iron  Transfuse to keep hemoglobin over 7 as needed     4. Pelvic mass  Likely cancer- is pending outpatient gynecological work-up-has GYN-onc appt coming up        5. Hypertension --bp/hr stable  Meds reviewed    Patient's condition is fair    Ambulate today and pt/ot      Plan to dc tomorrow if cleared by cardiology        Recommend to continue hospitalization. Discussed with patient. Chief Complaints:   Chief Complaint   Patient presents with    Allergic Reaction     SUBJECTIVE:  Pt is seen and examined.   Chart reviewed    Son is at bedside    Feels better          Review of systems:    Review of Systems Constitutional:  Positive for malaise/fatigue. HENT: Negative. Eyes: Negative. Respiratory:  Positive for shortness of breath. Cardiovascular:  Positive for leg swelling. Negative for chest pain, palpitations and orthopnea. Gastrointestinal: Negative. Negative for abdominal pain, diarrhea and heartburn. Genitourinary:  Negative for dysuria and hematuria. Skin: Negative. Neurological:  Positive for weakness. Psychiatric/Behavioral:  Negative for depression, substance abuse and suicidal ideas. The patient is not nervous/anxious. PE:  Patient Vitals for the past 24 hrs:   BP Temp Pulse Resp SpO2   02/04/23 0835 (!) 143/68 98.4 °F (36.9 °C) (!) 122 18 97 %   02/04/23 0428 (!) 159/67 98.4 °F (36.9 °C) 96 18 98 %   02/04/23 0234 128/69 -- -- -- --   02/04/23 0234 128/69 -- -- -- --   02/03/23 2326 (!) 159/57 98.4 °F (36.9 °C) 91 18 99 %   02/03/23 2024 (!) 162/65 -- 93 -- --   02/03/23 2018 128/69 -- (!) 126 -- --   02/03/23 1934 123/77 98.3 °F (36.8 °C) (!) 123 20 97 %   02/03/23 1717 (!) 143/59 98.3 °F (36.8 °C) 87 20 99 %     No intake or output data in the 24 hours ending 02/04/23 1032  Patient Vitals for the past 120 hrs:   Weight   02/02/23 1949 55.3 kg (122 lb)         Physical Exam  Vitals and nursing note reviewed. Constitutional:       General: She is in acute distress. Appearance: She is not ill-appearing. HENT:      Mouth/Throat:      Mouth: Mucous membranes are moist.   Eyes:      Extraocular Movements: Extraocular movements intact. Pupils: Pupils are equal, round, and reactive to light. Cardiovascular:      Rate and Rhythm: Normal rate and regular rhythm. Heart sounds: Normal heart sounds. Pulmonary:      Effort: Pulmonary effort is normal. No respiratory distress. Breath sounds: Normal breath sounds. Abdominal:      General: Bowel sounds are normal. There is no distension. Palpations: Abdomen is soft. Tenderness:  There is no abdominal tenderness. There is no rebound. Musculoskeletal:         General: Normal range of motion. Cervical back: Normal range of motion and neck supple. Skin:     General: Skin is warm and dry. Neurological:      Mental Status: She is alert. Intake and Output:  Current Shift:  No intake/output data recorded. Last three shifts:  No intake/output data recorded. Lab/Data Reviewed:  No results found for this or any previous visit (from the past 8 hour(s)).   Medications:  Current Facility-Administered Medications   Medication Dose Route Frequency    sodium chloride (NS) flush 5-40 mL  5-40 mL IntraVENous Q8H    sodium chloride (NS) flush 5-40 mL  5-40 mL IntraVENous PRN    acetaminophen (TYLENOL) tablet 650 mg  650 mg Oral Q6H PRN    Or    acetaminophen (TYLENOL) suppository 650 mg  650 mg Rectal Q6H PRN    polyethylene glycol (MIRALAX) packet 17 g  17 g Oral DAILY PRN    ondansetron (ZOFRAN ODT) tablet 4 mg  4 mg Oral Q8H PRN    Or    ondansetron (ZOFRAN) injection 4 mg  4 mg IntraVENous Q6H PRN    LORazepam (ATIVAN) tablet 1 mg  1 mg Oral Q4H PRN    atorvastatin (LIPITOR) tablet 40 mg  40 mg Oral QHS    amLODIPine (NORVASC) tablet 5 mg  5 mg Oral DAILY    ferrous sulfate tablet 325 mg  325 mg Oral BID WITH MEALS    polyethylene glycol (MIRALAX) packet 17 g  17 g Oral DAILY    simethicone (MYLICON) tablet 80 mg  80 mg Oral QID PRN    atenoloL (TENORMIN) tablet 100 mg  100 mg Oral DAILY       Recent Results (from the past 24 hour(s))   ECHO ADULT COMPLETE    Collection Time: 02/03/23  2:15 PM   Result Value Ref Range    IVSd 0.9 0.6 - 0.9 cm    LVIDd 4.1 3.9 - 5.3 cm    LVIDs 3.0 cm    LVOT Diameter 1.7 cm    LVPWd 0.9 0.6 - 0.9 cm    EF BP 51 (A) 55 - 100 %    LV Ejection Fraction A2C 54 %    LV Ejection Fraction A4C 50 %    LV EDV A2C 53 mL    LV EDV A4C 83 mL    LV EDV BP 71 56 - 104 mL    LV ESV A2C 24 mL    LV ESV A4C 42 mL    LV ESV BP 35 19 - 49 mL    LVOT Peak Gradient 10 mmHg    LVOT Mean Gradient 5 mmHg    LVOT SV 67.4 ml    LVOT Peak Velocity 1.6 m/s    LVOT VTI 29.7 cm    RVIDd 2.8 cm    RV Free Wall Peak S' 13 cm/s    RVOT Peak Gradient 3 mmHg    RVOT Mean Gradient 2 mmHg    RVOT Peak Velocity 0.8 m/s    RVOT VTI 13.6 cm    LA Diameter 4.2 cm    LA Volume A/L 53 mL    LA Volume 2C 46 22 - 52 mL    LA Volume 4C 47 22 - 52 mL    LA Volume BP 48 22 - 52 mL    AV Area by Peak Velocity 2.0 cm2    AV Area by VTI 2.1 cm2    AV Peak Gradient 12 mmHg    AV Mean Gradient 6 mmHg    AV Peak Velocity 1.7 m/s    AV Mean Velocity 1.2 m/s    AV VTI 30.9 cm    MV A Velocity 1.03 m/s    MV E Wave Deceleration Time 234.3 ms    MV E Velocity 1.03 m/s    LV E' Lateral Velocity 9 cm/s    LV E' Septal Velocity 7 cm/s    MV Regurg Velocity PISA 6.0 m/s    MR .5 cm    Pulmonary Artery EDP 7 mmHg    WI Max Velocity 1.3 m/s    PV Peak Gradient 8 mmHg    PV Mean Gradient 3 mmHg    PV Max Velocity 1.4 m/s    PV Mean Velocity 0.9 m/s    TAPSE 1.7 1.7 cm    Ascending Aorta 3.1 cm    Aortic Root 2.9 cm    Pulm Vein Peak D Velocity 0.5 m/s    Pulm Vein Peak S Velocity 0.5 m/s    Pulm Vein S/D 1.0 no units    Fractional Shortening 2D 27 28 - 44 %    LV ESV Index BP 23 mL/m2    LV EDV Index BP 48 mL/m2    LV ESV Index A4C 28 mL/m2    LV EDV Index A4C 56 mL/m2    LV ESV Index A2C 16 mL/m2    LV EDV Index A2C 36 mL/m2    LVIDd Index 2.75 cm/m2    LVIDs Index 2.01 cm/m2    LV RWT Ratio 0.44     LV Mass 2D 114.1 67 - 162 g    LV Mass 2D Index 76.6 43 - 95 g/m2    MV E/A 1.00     E/E' Ratio (Averaged) 13.08     E/E' Lateral 11.44     E/E' Septal 14.71     LA Volume Index BP 32 16 - 34 ml/m2    LA Volume Index A/L 36 16 - 34 mL/m2    LVOT Stroke Volume Index 45.2 mL/m2    LVOT Area 2.3 cm2    LA Volume Index 2C 31 16 - 34 mL/m2    LA Volume Index 4C 32 16 - 34 mL/m2    LA Size Index 2.82 cm/m2    LA/AO Root Ratio 1.45     Ao Root Index 1.95 cm/m2    Ascending Aorta Index 2.08 cm/m2    AV Velocity Ratio 0.94     LVOT:AV VTI Index 0. 96     JOANN/BSA VTI 1.4 cm2/m2    JOANN/BSA Peak Velocity 1.3 cm2/m2    RA Volume BP 32 mL    RA Volume Index BP 21 mL/m2    Est. RA Pressure 3 mmHg    TR Max Velocity 1.70 m/s    RVSP 15 mmHg    IVC Expiration 0.9 cm   METABOLIC PANEL, COMPREHENSIVE    Collection Time: 02/04/23 12:57 AM   Result Value Ref Range    Sodium 135 (L) 136 - 145 mmol/L    Potassium 4.7 3.5 - 5.5 mmol/L    Chloride 106 100 - 111 mmol/L    CO2 22 21 - 32 mmol/L    Anion gap 7 3.0 - 18 mmol/L    Glucose 130 (H) 74 - 99 mg/dL    BUN 34 (H) 7.0 - 18 MG/DL    Creatinine 1.53 (H) 0.6 - 1.3 MG/DL    BUN/Creatinine ratio 22 (H) 12 - 20      eGFR 37 (L) >60 ml/min/1.73m2    Calcium 8.8 8.5 - 10.1 MG/DL    Bilirubin, total 0.5 0.2 - 1.0 MG/DL    ALT (SGPT) 16 13 - 56 U/L    AST (SGOT) 17 10 - 38 U/L    Alk. phosphatase 85 45 - 117 U/L    Protein, total 6.7 6.4 - 8.2 g/dL    Albumin 3.0 (L) 3.4 - 5.0 g/dL    Globulin 3.7 2.0 - 4.0 g/dL    A-G Ratio 0.8 0.8 - 1.7     CBC WITH AUTOMATED DIFF    Collection Time: 02/04/23 12:57 AM   Result Value Ref Range    WBC 4.3 (L) 4.6 - 13.2 K/uL    RBC 3.50 (L) 4.20 - 5.30 M/uL    HGB 9.3 (L) 12.0 - 16.0 g/dL    HCT 28.7 (L) 35.0 - 45.0 %    MCV 82.0 78.0 - 100.0 FL    MCH 26.6 24.0 - 34.0 PG    MCHC 32.4 31.0 - 37.0 g/dL    RDW 15.9 (H) 11.6 - 14.5 %    PLATELET 002 916 - 398 K/uL    MPV 9.5 9.2 - 11.8 FL    NRBC 0.0 0  WBC    ABSOLUTE NRBC 0.00 0.00 - 0.01 K/uL    NEUTROPHILS 73 40 - 73 %    LYMPHOCYTES 18 (L) 21 - 52 %    MONOCYTES 7 3 - 10 %    EOSINOPHILS 2 0 - 5 %    BASOPHILS 0 0 - 2 %    IMMATURE GRANULOCYTES 1 (H) 0.0 - 0.5 %    ABS. NEUTROPHILS 3.1 1.8 - 8.0 K/UL    ABS. LYMPHOCYTES 0.8 (L) 0.9 - 3.6 K/UL    ABS. MONOCYTES 0.3 0.05 - 1.2 K/UL    ABS. EOSINOPHILS 0.1 0.0 - 0.4 K/UL    ABS. BASOPHILS 0.0 0.0 - 0.1 K/UL    ABS. IMM.  GRANS. 0.0 0.00 - 0.04 K/UL    DF AUTOMATED     MAGNESIUM    Collection Time: 02/04/23 12:57 AM   Result Value Ref Range    Magnesium 1.8 1.6 - 2.6 mg/dL   TROPONIN-HIGH SENSITIVITY    Collection Time: 02/04/23 12:58 AM   Result Value Ref Range    Troponin-High Sensitivity 47 0 - 54 ng/L       Procedures/imaging: see electronic medical records for all procedures/Xrays and details which were not copied into this note but were reviewed prior to creation of Alan Adams MD   2/4/2023, 10:32 AM

## 2023-02-04 NOTE — PROGRESS NOTES
Cardiology Progress Note        Patient: Walker Kimball        Sex: female          DOA: 2/2/2023  YOB: 1955      Age:  69 y.o.        LOS:  LOS: 0 days     Patient seen and examined, chart reviewed. Assessment/Plan     Patient Active Problem List   Diagnosis Code    Hypomagnesemia E83.42    Arrhythmia I49.9    Symptomatic anemia D64.9    QUINN (acute kidney injury) ([facility] [address] N17.9    Mass of pelvis R19.00    HTN (hypertension) I10    Elevated troponin R77.8    SVT (supraventricular tachycardia) ([facility]) I47.1      PSVT  Abnormal troponin: No clear evidence of ischemia, could be demand ischemia       56-year-old female came with complaining of palpitation and found to have PSVT. She was converted to sinus rhythm with IV adenosine. Patient  was on atenolol for long time and it was changed to carvedilol recently. She  was started on 3.125 mg by mouth carvedilol and dose was increased to 6.25 mg. Patient and family member has concern that after she was placed on this medication she developed palpitation. Last episode of palpitation was in 2020. Currently she is complaining of palpitation. At monitor revealed PSVT with ventricular rate 126 beats per minute  TSH is normal      Echocardiogram revealed       Left Ventricle: Normal left ventricular systolic function with a visually estimated EF of 55 - 60%. Left ventricle size is normal. Normal wall thickness. Normal wall motion. Grade I diastolic dysfunction present with normal LV EF. Mitral Valve: Mildly thickened leaflet. Mild to moderate regurgitation. Tricuspid Valve: Unable to assess RVSP due to inadequate or insignificant tricuspid regurgitation. Pulmonic Valve: The pulmonic valve was not well visualized. Mild regurgitation. Plan:      Continue atenolol  If patient has more frequent episodes of PSVT will consider antiarrhythmic. Monitor heart rate/ blood pressure    Monitor potassium and magnesium. Continue telemetry monitoring. Plan discussed with patient and family member at bedside            Subjective:    cc:   Denies any chest pain or palpitation. This morning I had episode of palpitation      REVIEW OF SYSTEMS:     General: No fevers or chills. Cardiovascular: No chest pain, positive palpitations, No orthopnea, No PND, No leg swelling, No claudication  Pulmonary: No  dyspnea. Gastrointestinal: No nausea, vomiting, bleeding  Neurology: No Dizziness    Objective:      Visit Vitals  BP (!) 143/68   Pulse (!) 122   Temp 98.4 °F (36.9 °C)   Resp 18   Ht 4' 11\" (1.499 m)   Wt 55.3 kg (122 lb)   SpO2 97%   BMI 24.64 kg/m²     Body mass index is 24.64 kg/m². Physical Exam:  General Appearance: Comfortable, not using accessory muscles of respiration. HEENT: EUNICE. HEAD: Atraumatic  NECK: No JVD, no thyroidomeglay. CAROTIDS: no bruit  LUNGS: Clear bilaterally. HEART: S1+S2 audible, no murmur, no pericardial rub. ABD: Non-tender, BS Audible    EXT: No edema, and no cyanosis. VASCULAR EXAM: Pulses are intact. PSYCHIATRIC EXAM: Mood is appropriate. MUSCULOSKELETAL: Grossly no joint deformity.   NEUROLOGICAL: AAO times 3, No motor and sensory deficit    Medication:  Current Facility-Administered Medications   Medication Dose Route Frequency    sodium chloride (NS) flush 5-40 mL  5-40 mL IntraVENous Q8H    sodium chloride (NS) flush 5-40 mL  5-40 mL IntraVENous PRN    acetaminophen (TYLENOL) tablet 650 mg  650 mg Oral Q6H PRN    Or    acetaminophen (TYLENOL) suppository 650 mg  650 mg Rectal Q6H PRN    polyethylene glycol (MIRALAX) packet 17 g  17 g Oral DAILY PRN    ondansetron (ZOFRAN ODT) tablet 4 mg  4 mg Oral Q8H PRN    Or    ondansetron (ZOFRAN) injection 4 mg  4 mg IntraVENous Q6H PRN    LORazepam (ATIVAN) tablet 1 mg  1 mg Oral Q4H PRN    atorvastatin (LIPITOR) tablet 40 mg  40 mg Oral QHS    amLODIPine (NORVASC) tablet 5 mg  5 mg Oral DAILY    ferrous sulfate tablet 325 mg  325 mg Oral BID WITH MEALS    polyethylene glycol (MIRALAX) packet 17 g  17 g Oral DAILY    simethicone (MYLICON) tablet 80 mg  80 mg Oral QID PRN    atenoloL (TENORMIN) tablet 100 mg  100 mg Oral DAILY               Lab/Data Reviewed:       Recent Labs     02/04/23 0057 02/03/23 0621 02/02/23 2015   WBC 4.3* 3.5* 5.9   HGB 9.3* 9.4* 10.5*   HCT 28.7* 29.1* 32.5*    285 344     Recent Labs     02/04/23 0057 02/03/23 0621 02/02/23 2015   * 136 135*   K 4.7 4.6 4.8    107 105   CO2 22 24 20*   * 89 129*   BUN 34* 26* 26*   CREA 1.53* 1.26 1.42*   CA 8.8 9.0 9.2       Signed By: Mini Jaimes MD     February 4, 2023

## 2023-02-04 NOTE — PROGRESS NOTES
RRT called at 2013 for slow SVT, HR 130s, /69, pt alert and oriented X 4, no acute distress noted, denies chest pain,palpitations, sob, EKG done, adenosine 6 mg iv given at 2022, HR 80-90s after adenosine, /65 ,sinus rhythm now, daughter present in [address].

## 2023-02-04 NOTE — PROGRESS NOTES
RRT called for SVT  Patient with no chest pain  Given 6mg of adenosine with conversion to sinus rhythm  No chest pain after

## 2023-02-04 NOTE — CONSULTS
[facility] Consult Note      Patient: Shannon Scruggs MRN: [id number]  SSN: [id number]    YOB: 1955  Age: 69 y.o. Sex: female    Date of Consultation: 02/02/2023  Referring Physician: Dr. Damien Briceño   Reason for Consultation: Abnormal troponin, SVT    Chief complain: Palpitation     HPI:   57-year-old female came to emergency room with complaining of palpitation and elevated heart rate. On ER arrival she was in SVT and converted to sinus rhythm after 6 mg of IV adenosine. Cardiology consult called for evaluation of abnormal troponin. Currently she is having palpitation. Complaining of shortness of breath on exertion. Denies any orthopnea or PND. Palpitation is associated with chest discomfort. Patient was admitted with acute kidney injury and symptomatic anemia and found to have pelvic mass. She was discharged yesterday and came back with complaining of palpitation. Patient was on atenolol for long time and recently it was discontinued and she was started on carvedilol denies any smoking or alcohol abuse. Past Medical History:   Diagnosis Date    Arrhythmia     \"my heart beats fast\" \" I am awaiting results from Cardiologist\"    Hypertension     Psychiatric disorder     anxiety     No past surgical history on file.   Current Facility-Administered Medications   Medication Dose Route Frequency    sodium chloride (NS) flush 5-40 mL  5-40 mL IntraVENous Q8H    sodium chloride (NS) flush 5-40 mL  5-40 mL IntraVENous PRN    acetaminophen (TYLENOL) tablet 650 mg  650 mg Oral Q6H PRN    Or    acetaminophen (TYLENOL) suppository 650 mg  650 mg Rectal Q6H PRN    polyethylene glycol (MIRALAX) packet 17 g  17 g Oral DAILY PRN    ondansetron (ZOFRAN ODT) tablet 4 mg  4 mg Oral Q8H PRN    Or    ondansetron (ZOFRAN) injection 4 mg  4 mg IntraVENous Q6H PRN    LORazepam (ATIVAN) tablet 1 mg  1 mg Oral Q4H PRN    atorvastatin (LIPITOR) tablet 40 mg  40 mg Oral QHS    amLODIPine (NORVASC) tablet 5 mg  5 mg Oral DAILY    ferrous sulfate tablet 325 mg  325 mg Oral BID WITH MEALS    carvediloL (COREG) tablet 6.25 mg  6.25 mg Oral BID WITH MEALS    [START ON 2/4/2023] polyethylene glycol (MIRALAX) packet 17 g  17 g Oral DAILY    simethicone (MYLICON) tablet 80 mg  80 mg Oral QID PRN       Allergies and Intolerances:   No Known Allergies    Family History:   No family history on file. Social History:   She  reports that she has never smoked. She has never used smokeless tobacco.  She  reports no history of alcohol use. Review of Systems:     Gen: No fever, chills, malaise, weight loss/gain. Heent: No headache, rhinorrhea, epistaxis, ear pain, hearing loss, sinus pain, neck pain/stiffness, sore throat. Heart:  positive chest pain, palpitations,  shortness of breath, no pnd, or orthopnea. Resp: No cough, hemoptysis, wheezing and  dyspnea  GI: No nausea, vomiting, diarrhea, constipation, melena or hematochezia. : No urinary obstruction, dysuria or hematuria. Derm: No rash, new skin lesion or pruritis. Musc/skeletal:  positive bone or joint complains. Vasc: No edema, cyanosis or claudication. Endo: No heat/cold intolerance, no polyuria,polydipsia or polyphagia. Neuro: No unilateral weakness, numbness, tingling. No seizures. Heme: No easy bruising or bleeding. Physical:   Patient Vitals for the past 6 hrs:   Temp Pulse Resp BP SpO2   02/03/23 1934 98.3 °F (36.8 °C) (!) 123 20 123/77 97 %         Exam:   General Appearance: Comfortable, not using accessory muscles of respiration. HEENT: EUNICE. HEAD: Atraumatic  NECK: No JVD, no thyroidomeglay. CAROTIDS: no bruit  LUNGS: Clear bilaterally. HEART: S1+S2 audible, tachycardic, no murmur, no pericardial rub. ABD: Non-tender, BS Audible    EXT: No edema, and no cyanosis. VASCULAR EXAM: Pulses are intact. PSYCHIATRIC EXAM: Mood is appropriate. MUSCULOSKELETAL: Grossly no joint deformity.   NEUROLOGICAL: AAO times 3, Motor and sensory sytem intact    Review of Data:   LABS:   Lab Results   Component Value Date/Time    WBC 3.5 (L) 02/03/2023 06:21 AM    HGB 9.4 (L) 02/03/2023 06:21 AM    HCT 29.1 (L) 02/03/2023 06:21 AM    PLATELET 448 25/69/2939 06:21 AM     Lab Results   Component Value Date/Time    Sodium 136 02/03/2023 06:21 AM    Potassium 4.6 02/03/2023 06:21 AM    Chloride 107 02/03/2023 06:21 AM    CO2 24 02/03/2023 06:21 AM    Glucose 89 02/03/2023 06:21 AM    BUN 26 (H) 02/03/2023 06:21 AM    Creatinine 1.26 02/03/2023 06:21 AM     Lab Results   Component Value Date/Time    Cholesterol, total 193 09/19/2018 05:00 AM    HDL Cholesterol 14 (L) 09/19/2018 05:00 AM    LDL, calculated 138.8 (H) 09/19/2018 05:00 AM    Triglyceride 201 (H) 09/19/2018 05:00 AM     No components found for: GPT  Lab Results   Component Value Date/Time    Hemoglobin A1c 6.2 (H) 09/19/2018 05:00 AM         Cardiology Procedures:     EKG revealed Narrow complex tachycardia       Impression / Plan:    Patient Active Problem List   Diagnosis Code    Hypomagnesemia E83.42    Arrhythmia I49.9    Symptomatic anemia D64.9    QUINN (acute kidney injury) ([facility] [address] N17.9    Mass of pelvis R19.00    HTN (hypertension) I10    Elevated troponin R77.8    SVT (supraventricular tachycardia) ([facility]) I47.1     PSVT  Abnormal troponin: No clear evidence of ischemia, could be demand ischemia      76-year-old female came with complaining of palpitation and found to have PSVT. She was converted to sinus rhythm with IV adenosine. Patient  was on atenolol for long time and it was changed to carvedilol recently. She  was started on 3.125 mg by mouth carvedilol and dose was increased to 6.25 mg. Patient and family member has concern that after she was placed on this medication she developed palpitation. Last episode of palpitation was in 2020. Currently she is complaining of palpitation.   At monitor revealed PSVT with ventricular rate 126 beats per minute  TSH is normal     Echocardiogram revealed Left Ventricle: Normal left ventricular systolic function with a visually estimated EF of 55 - 60%. Left ventricle size is normal. Normal wall thickness. Normal wall motion. Grade I diastolic dysfunction present with normal LV EF. Mitral Valve: Mildly thickened leaflet. Mild to moderate regurgitation. Tricuspid Valve: Unable to assess RVSP due to inadequate or insignificant tricuspid regurgitation. Pulmonic Valve: The pulmonic valve was not well visualized. Mild regurgitation. Plan:       Advised to give IV adenosine 6 mg 1 dose. Change carvedilol to atenolol. Monitor heart rate/ blood pressure    Monitor potassium and magnesium. Continue telemetry monitoring.         Signed By: Tray Hill MD     February 3, 2023

## 2023-02-04 NOTE — ROUTINE PROCESS
RRT called due to heart rate in 130's slow SVT.    /69--denies chest pain/dizziness/SOB    6mg Adenosine pushed @ 2022    BP post adenosine 162/65  94%RA SpO2  Pulse 92    Repeat EKG WNL     RRT ended

## 2023-02-04 NOTE — PROGRESS NOTES
[facility]  Hospitalist Progress Note  Aurelio Encinas, DO 11/30/2017    Reason for Stay (Diagnosis): respiratory failure         Assessment and Plan:      Summary of Stay:   Kat Gomez is a 86 year old female with past medical history of CAD, prior aortic valve repair and ascending aortic dissection repair, complete heart block s/p PPM, PAF (Eliquis), CKD3, LISSET not on CPAP, and diastolic CHF who was admitted on 11/22/2017 with several days of shortness of breath and fatigue and was found to have acute hypoxic respiratory failure and PNA as well as possible mild CHF exacerbation. She was transferred to the ICU on admission due to respiratory failure requiring BiPAP. She was intubated on 11/27 due to worsening respiratory failure.    Problem List:   1. Acute hypoxic respiratory failure.  Due to pneumonia.  Intubated.  Continue vent support.  CT with worsening infiltrates.  Check BNP.  Abx as below.  2. Pneumonia.  CT Chest shows worsening infiltrates.  Continue IV Zosyn.  Add IV Vanco.    3. Acute on chronic diastolic CHF exacerbation.  Give IV Lasix 40 mg once.  Monitor daily weight and strict I&O's.  4. A fib.  Continue Eliquis and Metoprolol.  5. GI prophylaxis.  IV Protonix.  6. Hyperlipidemia.  Zocor.  7. CAD.  Zocor, Metoprolol.  8. Chronic kideny disease.  Avoid nephrotoxins as able.  Monitor.  9. Hypokalemia.  Potassium replacement protocol.  10. Recent ankle fracture.  Pain meds PRN.  DVT Prophylaxis: Eliquis.  Code Status: DNR  Discharge Dispo: TBD  Estimated Disch Date / # of Days until Disch: 3+        Interval History (Subjective):      Intubated and sedated.                  Physical Exam:      Last Vital Signs:  /54  Pulse 60  Temp 99.2  F (37.3  C) (Axillary)  Resp 23  Ht 1.524 m (5')  Wt 78.1 kg (172 lb 2.9 oz)  SpO2 98%  BMI 33.63 kg/m2    Gen:  Intubated and sedated.  Eyes:  PERRL, sclera anicteric.  OP:  MMM, no lesions.  Neck:  Supple.  CV:  Mildly  Problem: Falls - Risk of  Goal: *Absence of Falls  Description: Document Dali Brewster Fall Risk and appropriate interventions in the flowsheet.   Outcome: Progressing Towards Goal  Note: Fall Risk Interventions:            Medication Interventions: Assess postural VS orthostatic hypotension, Evaluate medications/consider consulting pharmacy, Patient to call before getting OOB, Teach patient to arise slowly                   Problem: Patient Education: Go to Patient Education Activity  Goal: Patient/Family Education  Outcome: Progressing Towards Goal     Problem: Patient Education: Go to Patient Education Activity  Goal: Patient/Family Education  Outcome: Progressing Towards Goal     Problem: Unstable angina/NSTEMI: Day of Admission/Day 1  Goal: Diagnostic Test/Procedures  Outcome: Progressing Towards Goal  Goal: Medications  Outcome: Progressing Towards Goal     Problem: Anemia Care Plan (Adult and Pediatric)  Goal: *Labs within defined limits  Outcome: Progressing Towards Goal  Goal: *Tolerates increased activity  Outcome: Progressing Towards Goal     Problem: Patient Education: Go to Patient Education Activity  Goal: Patient/Family Education  Outcome: Progressing Towards Goal irregular, no murmurs.  Lung:  Crackles b/l, vent sounds b/l, normal effort.  Ab:  +BS, soft.  Skin:  Warm, dry to touch.  No rash.  Ext:  1+ pitting edema LE b/l.      All current medications were reviewed with changes reflected in problem list.         Data:      All new lab and imaging data was reviewed.   Labs:    Recent Labs  Lab 11/30/17  0527      POTASSIUM 3.5   CHLORIDE 110*   CO2 26   ANIONGAP 7   *   BUN 51*   CR 1.08*   GFRESTIMATED 48*   GFRESTBLACK 58*   OLI 8.1*       Recent Labs  Lab 11/29/17  0540   WBC 15.0*   HGB 9.0*   HCT 28.0*   MCV 89         Imaging:   Recent Results (from the past 24 hour(s))   CT Chest Pulmonary Embolism w Contrast    Narrative    CT CHEST PULMONARY EMBOLISM WITH CONTRAST  11/30/2017 11:53 AM    HISTORY:  Unexplained hypoxia on ventilator.    TECHNIQUE: Scans obtained from the apices through the diaphragm with  IV contrast. 71 mL Isovue-370 injected. Radiation dose for this scan  was reduced using automated exposure control, adjustment of the mA  and/or kV according to patient size, or iterative reconstruction  technique.    COMPARISON:  CT chest 11/22/2017. CT chest 9/13/2017.    FINDINGS:  Again identified is a type A thoracic aortic dissection  extending from the proximal ascending thoracic aorta, through the  arch, and through the descending thoracic aorta into the upper  abdominal aorta. The overall configuration appears stable. Aneurysm of  the ascending thoracic aorta distally is stable measuring 5.2 cm,  series 4 image 51. 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 Proximal descending thoracic aortic aneurysm is stable  measuring 4.1 cm, image 50. Distal descending thoracic aortic aneurysm  is stable measuring 3.1 cm, image 79. No evidence for pulmonary  embolism. Small bibasilar pleural fluid again noted. This is just  slightly increased in volume on the left. Diffuse bilateral patchy  groundglass airspace opacities have  increased within the upper to mid  lungs. Consolidation has increased in density at the left lower lobe.    Groundglass opacities also increased at the right lung base.    ET tube tip lies above the level of the ping. Right chest pacemaker  noted. Sternotomy changes. Upper abdomen images show cholelithiasis.  Nasogastric tube at least within the distal stomach.      Impression    IMPRESSION:  1. Stable configuration of type A thoracic aortic dissection. Stable  sizes of the multilevel thoracic aortic aneurysm.  2. Increased diffuse patchy groundglass airspace disease. Increased  consolidation at the left lung base. Findings may represent  progression of pulmonary edema and/or pneumonia.  3. Small bilateral pleural fluid, slightly larger on the left. This is  stable on the right.

## 2023-02-04 NOTE — PROGRESS NOTES
Problem: Mobility Impaired (Adult and Pediatric)  Goal: *Acute Goals and Plan of Care (Insert Text)  Description: No goals necessary as pt does not warrant skilled PT at this time  Note:   PHYSICAL THERAPY EVALUATION AND DISCHARGE    Patient: Shital Luna (74 y.o. female)  Date: 2/4/2023  Primary Diagnosis: Elevated troponin [R77.8]       Precautions:        PLOF: Independent    ASSESSMENT :  Based on the objective data described below, the patient presents with functional AROM, strength, independent transfers and gait abilities. Son present in room and pt  requests son to be  for pt. Pt sitting in recliner upon arrival.  Pt reports pain in R rib lateral region only when ambulating. Pt able to transfer sit<>stand independent. Ambulation in hallway independent and safe. Pt c/o mild rib pain only at end of gait distance. Encouraged pt to ambulate, frequent short distances. No SOB throughout session. SpO2 on RA remained 96-97% throughout session. Pt does not warrant acute skilled PT at this time. Pt left supine in recliner w/ all needs within reach. Patient does not require further skilled intervention at this level of care. PLAN :  Recommendations and Planned Interventions:   No formal PT needs identified at this time. Discharge Recommendations: Outpatient Cardiac Rehab/Outpatient Pulmonary Rehab  Further Equipment Recommendations for Discharge: N/A    AMPAC: 24/24    This AMPAC score should be considered in conjunction with interdisciplinary team recommendations to determine the most appropriate discharge setting. Patient's social support, diagnosis, medical stability, and prior level of function should also be taken into consideration. SUBJECTIVE:   Patient stated I only have pain when I walk.     OBJECTIVE DATA SUMMARY:     Past Medical History:   Diagnosis Date    Arrhythmia     \"my heart beats fast\" \" I am awaiting results from Cardiologist\"    Hypertension Psychiatric disorder     anxiety   No past surgical history on file. Barriers to Learning/Limitations: yes;  language and cultural   Compensate with: Visual Cues, Verbal Cues, and Tactile Cues  Home Situation:   Home Situation  Home Environment: Private residence  # Steps to Enter: 1  One/Two Story Residence: Two story  # of Interior Steps: 12  Interior Rails: Both  Lift Chair Available: No  Living Alone: No  Support Systems: Child(ty)  Patient Expects to be Discharged to[de-identified] Home  Current DME Used/Available at Home: None  Critical Behavior:  Neurologic State: Alert  Orientation Level: Oriented X4  Cognition: Appropriate decision making; Appropriate for age attention/concentration; Appropriate safety awareness; Follows commands  Safety/Judgement: Awareness of environment  Psychosocial  Patient Behaviors: Calm; Cooperative  Family  Behaviors: Supportive;Calm  Purposeful Interaction: Yes  Pt Identified Daily Priority: Clinical issues (comment)  Caritas Process: Create healing environment  Caring Interventions: Reassure; Therapeutic modalities  Reassure: Therapeutic listening; Informing; Acceptance;Quiet presence  Therapeutic Modalities: Intentional therapeutic touch  Family  Behaviors: Supportive;Calm  Strength:    Strength: Within functional limits  Tone & Sensation:   Sensation: Intact  Range Of Motion:  AROM: Within functional limits  Posture:  Functional Mobility:  Bed Mobility:  Rolling: Independent  Supine to Sit: Independent  Sit to Supine: Independent  Scooting: Independent  Transfers:  Sit to Stand: Independent  Stand to Sit: Independent  Balance:   Sitting: Intact  Standing: Intact; Without support  Wheelchair Mobility:  Ambulation/Gait Training:  Distance (ft): 150 Feet (ft)  Assistive Device:  (none)  Ambulation - Level of Assistance: Independent    Therapeutic Exercises:   Pain:  Pain level pre-treatment: 0/10   Pain level post-treatment: 0/10  Pain Intervention(s): Medication (see MAR);  Rest, Ice, Repositioning Response to intervention: Nurse notified, See doc flow    Activity Tolerance:   Good   Please refer to the flowsheet for vital signs taken during this treatment. After treatment:   [x]         Patient left in no apparent distress sitting up in chair  []         Patient left in no apparent distress in bed  [x]         Call bell left within reach  [x]         Nursing notified  [x]         Son present  []         Bed alarm activated  []         SCDs applied    COMMUNICATION/EDUCATION:   [x]         Role of Physical Therapy in the acute care setting. [x]         Fall prevention education was provided and the patient/caregiver indicated understanding. [x]         Patient/family have participated as able in goal setting and plan of care. []         Patient/family agree to work toward stated goals and plan of care. []         Patient understands intent and goals of therapy, but is neutral about his/her participation. []         Patient is unable to participate in goal setting/plan of care: ongoing with therapy staff.  []         Other:     Thank you for this referral.  Robert Coles, PT   Time Calculation: 17 mins      Eval Complexity: History: MEDIUM  Complexity : 1-2 comorbidities / personal factors will impact the outcome/ POC Exam:LOW Complexity : 1-2 Standardized tests and measures addressing body structure, function, activity limitation and / or participation in recreation  Presentation: LOW Complexity : Stable, uncomplicated  Clinical Decision Making:Low Complexity    Overall Complexity:LOW     M [employer] AM-PAC® Basic Mobility Inpatient Short Form (6-Clicks) Version 2    How much HELP from another person does the patient currently need    (If the patient hasn't done an activity recently, how much help from another person do you think he/she would need if he/she tried?)   Total (Total A or Dep)   A Lot  (Mod to Max A)   A Little (Sup or Min A)   None (Mod I to I)   Turning from your back to your side while in a flat bed without using bedrails? [] 1 [] 2 [] 3 [x] 4   2. Moving from lying on your back to sitting on the side of a flat bed without using bedrails? [] 1 [] 2 [] 3 [x] 4   3. Moving to and from a bed to a chair (including a wheelchair)? [] 1 [] 2 [] 3 [x] 4   4. Standing up from a chair using your arms (e.g., wheelchair, or bedside chair)? [] 1 [] 2 [] 3 [x] 4   5. Walking in hospital room? [] 1 [] 2 [] 3 [x] 4   6. Climbing 3-5 steps with a railing?+   [] 1 [] 2 [] 3 [x] 4   +If stair climbing cannot be assessed, skip item #6. Sum responses from items 1-5. Based on an AM-PAC score of **/24 (or **/20 if omitting stairs) and their current functional mobility deficits, it is recommended that the patient have 5-7 sessions per week of Physical Therapy at d/c to increase the patient's independence. Currently, this patient demonstrates the potential endurance, and/or tolerance for 3 hours of therapy each day at d/c. Based on an AM-PAC score of **/24 (**/20 if omitting stairs) and their current functional mobility deficits, it is recommended that the patient have 3-5 sessions per week of Physical Therapy at d/c to increase the patient's independence. Based on an AM-PAC score of **/24 (or **/20 if omitting stairs) and their current functional mobility deficits, it is recommended that the patient have 2-3 sessions per week of Physical Therapy at d/c to increase the patient's independence. At this time and based on an AM-PAC score of 24/24 (or **/20 if omitting stairs), no further PT is recommended upon discharge due to (i.e. patient at baseline functional statusetc). Recommend patient returns to prior setting with prior services.

## 2023-02-05 LAB
ALBUMIN SERPL-MCNC: 2.9 G/DL (ref 3.4–5)
ALBUMIN SERPL-MCNC: 3 G/DL (ref 3.4–5)
ALBUMIN/GLOB SERPL: 0.8 (ref 0.8–1.7)
ALBUMIN/GLOB SERPL: 0.8 (ref 0.8–1.7)
ALP SERPL-CCNC: 117 U/L (ref 45–117)
ALP SERPL-CCNC: 123 U/L (ref 45–117)
ALT SERPL-CCNC: 26 U/L (ref 13–56)
ALT SERPL-CCNC: 26 U/L (ref 13–56)
ANION GAP SERPL CALC-SCNC: 5 MMOL/L (ref 3–18)
ANION GAP SERPL CALC-SCNC: 8 MMOL/L (ref 3–18)
AST SERPL-CCNC: 28 U/L (ref 10–38)
AST SERPL-CCNC: 29 U/L (ref 10–38)
ATRIAL RATE: 59 BPM
ATRIAL RATE: 71 BPM
BASOPHILS # BLD: 0 K/UL (ref 0–0.1)
BASOPHILS NFR BLD: 0 % (ref 0–2)
BILIRUB SERPL-MCNC: 0.3 MG/DL (ref 0.2–1)
BILIRUB SERPL-MCNC: 0.4 MG/DL (ref 0.2–1)
BUN SERPL-MCNC: 39 MG/DL (ref 7–18)
BUN SERPL-MCNC: 41 MG/DL (ref 7–18)
BUN/CREAT SERPL: 22 (ref 12–20)
BUN/CREAT SERPL: 23 (ref 12–20)
CALCIUM SERPL-MCNC: 8.6 MG/DL (ref 8.5–10.1)
CALCIUM SERPL-MCNC: 8.6 MG/DL (ref 8.5–10.1)
CALCULATED P AXIS, ECG09: 47 DEGREES
CALCULATED P AXIS, ECG09: 72 DEGREES
CALCULATED R AXIS, ECG10: 120 DEGREES
CALCULATED R AXIS, ECG10: 99 DEGREES
CALCULATED T AXIS, ECG11: 50 DEGREES
CALCULATED T AXIS, ECG11: 65 DEGREES
CHLORIDE SERPL-SCNC: 104 MMOL/L (ref 100–111)
CHLORIDE SERPL-SCNC: 105 MMOL/L (ref 100–111)
CO2 SERPL-SCNC: 22 MMOL/L (ref 21–32)
CO2 SERPL-SCNC: 23 MMOL/L (ref 21–32)
CREAT SERPL-MCNC: 1.77 MG/DL (ref 0.6–1.3)
CREAT SERPL-MCNC: 1.8 MG/DL (ref 0.6–1.3)
DIAGNOSIS, 93000: NORMAL
DIAGNOSIS, 93000: NORMAL
DIFFERENTIAL METHOD BLD: ABNORMAL
EOSINOPHIL # BLD: 0 K/UL (ref 0–0.4)
EOSINOPHIL NFR BLD: 1 % (ref 0–5)
ERYTHROCYTE [DISTWIDTH] IN BLOOD BY AUTOMATED COUNT: 16.2 % (ref 11.6–14.5)
GLOBULIN SER CALC-MCNC: 3.6 G/DL (ref 2–4)
GLOBULIN SER CALC-MCNC: 3.9 G/DL (ref 2–4)
GLUCOSE SERPL-MCNC: 122 MG/DL (ref 74–99)
GLUCOSE SERPL-MCNC: 99 MG/DL (ref 74–99)
HCT VFR BLD AUTO: 28.7 % (ref 35–45)
HGB BLD-MCNC: 9.1 G/DL (ref 12–16)
IMM GRANULOCYTES # BLD AUTO: 0 K/UL (ref 0–0.04)
IMM GRANULOCYTES NFR BLD AUTO: 1 % (ref 0–0.5)
LYMPHOCYTES # BLD: 0.8 K/UL (ref 0.9–3.6)
LYMPHOCYTES NFR BLD: 12 % (ref 21–52)
MAGNESIUM SERPL-MCNC: 2 MG/DL (ref 1.6–2.6)
MAGNESIUM SERPL-MCNC: 2.1 MG/DL (ref 1.6–2.6)
MCH RBC QN AUTO: 26.8 PG (ref 24–34)
MCHC RBC AUTO-ENTMCNC: 31.7 G/DL (ref 31–37)
MCV RBC AUTO: 84.7 FL (ref 78–100)
MONOCYTES # BLD: 0.5 K/UL (ref 0.05–1.2)
MONOCYTES NFR BLD: 7 % (ref 3–10)
NEUTS SEG # BLD: 5.2 K/UL (ref 1.8–8)
NEUTS SEG NFR BLD: 80 % (ref 40–73)
NRBC # BLD: 0 K/UL (ref 0–0.01)
NRBC BLD-RTO: 0 PER 100 WBC
P-R INTERVAL, ECG05: 134 MS
P-R INTERVAL, ECG05: 170 MS
PHOSPHATE SERPL-MCNC: 4.2 MG/DL (ref 2.5–4.9)
PLATELET # BLD AUTO: 238 K/UL (ref 135–420)
PMV BLD AUTO: 9.8 FL (ref 9.2–11.8)
POTASSIUM SERPL-SCNC: 5.3 MMOL/L (ref 3.5–5.5)
POTASSIUM SERPL-SCNC: 6 MMOL/L (ref 3.5–5.5)
PROT SERPL-MCNC: 6.5 G/DL (ref 6.4–8.2)
PROT SERPL-MCNC: 6.9 G/DL (ref 6.4–8.2)
Q-T INTERVAL, ECG07: 390 MS
Q-T INTERVAL, ECG07: 414 MS
QRS DURATION, ECG06: 84 MS
QRS DURATION, ECG06: 90 MS
QTC CALCULATION (BEZET), ECG08: 409 MS
QTC CALCULATION (BEZET), ECG08: 423 MS
RBC # BLD AUTO: 3.39 M/UL (ref 4.2–5.3)
SODIUM SERPL-SCNC: 133 MMOL/L (ref 136–145)
SODIUM SERPL-SCNC: 134 MMOL/L (ref 136–145)
VENTRICULAR RATE, ECG03: 59 BPM
VENTRICULAR RATE, ECG03: 71 BPM
WBC # BLD AUTO: 6.5 K/UL (ref 4.6–13.2)

## 2023-02-05 PROCEDURE — 83735 ASSAY OF MAGNESIUM: CPT

## 2023-02-05 PROCEDURE — 80053 COMPREHEN METABOLIC PANEL: CPT

## 2023-02-05 PROCEDURE — 74011250637 HC RX REV CODE- 250/637: Performed by: INTERNAL MEDICINE

## 2023-02-05 PROCEDURE — 74011250637 HC RX REV CODE- 250/637: Performed by: FAMILY MEDICINE

## 2023-02-05 PROCEDURE — 84100 ASSAY OF PHOSPHORUS: CPT

## 2023-02-05 PROCEDURE — 85025 COMPLETE CBC W/AUTO DIFF WBC: CPT

## 2023-02-05 PROCEDURE — 74011250636 HC RX REV CODE- 250/636: Performed by: INTERNAL MEDICINE

## 2023-02-05 PROCEDURE — 36415 COLL VENOUS BLD VENIPUNCTURE: CPT

## 2023-02-05 PROCEDURE — 74011000250 HC RX REV CODE- 250: Performed by: INTERNAL MEDICINE

## 2023-02-05 PROCEDURE — 74011250637 HC RX REV CODE- 250/637: Performed by: HOSPITALIST

## 2023-02-05 PROCEDURE — 65270000029 HC RM PRIVATE

## 2023-02-05 RX ORDER — AMLODIPINE BESYLATE 5 MG/1
10 TABLET ORAL DAILY
Status: DISCONTINUED | OUTPATIENT
Start: 2023-02-06 | End: 2023-02-06 | Stop reason: HOSPADM

## 2023-02-05 RX ORDER — ATENOLOL 50 MG/1
50 TABLET ORAL DAILY
Status: DISCONTINUED | OUTPATIENT
Start: 2023-02-06 | End: 2023-02-06 | Stop reason: HOSPADM

## 2023-02-05 RX ADMIN — FERROUS SULFATE TAB 325 MG (65 MG ELEMENTAL FE) 325 MG: 325 (65 FE) TAB at 17:13

## 2023-02-05 RX ADMIN — SODIUM CHLORIDE, PRESERVATIVE FREE 10 ML: 5 INJECTION INTRAVENOUS at 17:13

## 2023-02-05 RX ADMIN — SODIUM CHLORIDE, PRESERVATIVE FREE 10 ML: 5 INJECTION INTRAVENOUS at 22:12

## 2023-02-05 RX ADMIN — POLYETHYLENE GLYCOL 3350 17 G: 17 POWDER, FOR SOLUTION ORAL at 08:57

## 2023-02-05 RX ADMIN — OXYCODONE HYDROCHLORIDE 5 MG: 5 TABLET ORAL at 22:08

## 2023-02-05 RX ADMIN — ATORVASTATIN CALCIUM 40 MG: 20 TABLET, FILM COATED ORAL at 22:08

## 2023-02-05 RX ADMIN — SODIUM CHLORIDE, PRESERVATIVE FREE 10 ML: 5 INJECTION INTRAVENOUS at 06:33

## 2023-02-05 RX ADMIN — ATENOLOL 100 MG: 50 TABLET ORAL at 08:57

## 2023-02-05 RX ADMIN — AMLODIPINE BESYLATE 5 MG: 5 TABLET ORAL at 08:57

## 2023-02-05 RX ADMIN — ACETAMINOPHEN 650 MG: 325 TABLET ORAL at 09:02

## 2023-02-05 RX ADMIN — SODIUM CHLORIDE, PRESERVATIVE FREE 10 ML: 5 INJECTION INTRAVENOUS at 14:00

## 2023-02-05 RX ADMIN — FERROUS SULFATE TAB 325 MG (65 MG ELEMENTAL FE) 325 MG: 325 (65 FE) TAB at 08:57

## 2023-02-05 RX ADMIN — ONDANSETRON 4 MG: 2 INJECTION INTRAMUSCULAR; INTRAVENOUS at 00:07

## 2023-02-05 NOTE — ROUTINE PROCESS
Bedside and Verbal shift change report given to Lexi Kendrick RN (oncoming nurse) by Fredi Matthews RN (offgoing nurse). Report included the following information SBAR, Kardex, MAR, and Cardiac Rhythm NSR to sarath Fernández .

## 2023-02-05 NOTE — PROGRESS NOTES
Hospitalist Progress Note    Patient: Nely Quintanilla MRN: [id number]  [facility]: [id number]    YOB: 1955  Age: 69 y.o. Sex: female    DOA: 2/2/2023 LOS:  LOS: 1 day            Patient Active Problem List   Diagnosis Code    Hypomagnesemia E83.42    Arrhythmia I49.9    Symptomatic anemia D64.9    QUINN (acute kidney injury) ([facility] [address] N17.9    Mass of pelvis R19.00    HTN (hypertension) I10    Elevated troponin R77.8    SVT (supraventricular tachycardia) ([address] I47.1        IMPRESSION and Plan:    Nely Quintanilla is a 69 y.o. female with   Patient Active Problem List    Diagnosis Date Noted    Elevated troponin 02/03/2023    SVT (supraventricular tachycardia) ([facility] [address] 02/03/2023    QUINN (acute kidney injury) ([facility] [address] 01/31/2023    Mass of pelvis 01/31/2023    HTN (hypertension) 01/31/2023    Symptomatic anemia 01/30/2023    Arrhythmia 03/11/2021    Hypomagnesemia 09/18/2018     Principal Problem:    Elevated troponin (2/3/2023)    Active Problems:    SVT (supraventricular tachycardia) ([address] (2/3/2023)      Came back to ER with palp, Chest tightness, SOB     1. Troponin elevation  Elevated D Dimer -- V/q low prob      2. SVT--- recurrent. Sp IV adenosine. Nm, mg and k. Continue to monitor on atenolol. Cont current rx. Encourage ambulation     3. Symptomatic anemia , iron def  Continue PO iron  Transfuse to keep hemoglobin over 7 as needed     4. Pelvic mass  Likely cancer- is pending outpatient gynecological work-up-has GYN-onc appt coming up        5. Hypertension --bp/hr stable  Meds reviewed    6. GERD / nausea - start pepcid bid. And fu    Patient's condition is fair    Ambulate today and pt/ot      Anticipatae dc home tomorrow with [facility]      Recommend to continue hospitalization. Discussed with patient and daughter    Chief Complaints:   Chief Complaint   Patient presents with    Allergic Reaction     SUBJECTIVE:  Pt is seen and examined.       Daughter is at bedside    She is feeling better except some nausea and R sided abd pain ( chronic)        Review of systems:    Review of Systems   Constitutional:  Positive for malaise/fatigue. HENT: Negative. Eyes: Negative. Respiratory:  Negative for shortness of breath. Cardiovascular:  Negative for chest pain, palpitations, orthopnea and leg swelling. Gastrointestinal:  Positive for abdominal pain and nausea. Negative for diarrhea and heartburn. Genitourinary:  Negative for dysuria and hematuria. Skin: Negative. Neurological:  Positive for weakness. Psychiatric/Behavioral:  Negative for depression, substance abuse and suicidal ideas. The patient is not nervous/anxious. PE:  Patient Vitals for the past 24 hrs:   BP Temp Pulse Resp SpO2   02/05/23 0802 (!) 150/51 98.5 °F (36.9 °C) (!) 57 18 94 %   02/05/23 0423 (!) 103/54 97.9 °F (36.6 °C) (!) 57 18 96 %   02/05/23 0053 (!) 135/58 98.6 °F (37 °C) 62 20 98 %   02/04/23 2020 (!) 146/83 98.6 °F (37 °C) (!) 126 17 99 %   02/04/23 1600 (!) 128/51 98.7 °F (37.1 °C) (!) 58 18 98 %       No intake or output data in the 24 hours ending 02/05/23 0935  Patient Vitals for the past 120 hrs:   Weight   02/02/23 1949 55.3 kg (122 lb)           Physical Exam  Vitals and nursing note reviewed. Constitutional:       General: She is in acute distress. Appearance: She is not ill-appearing. HENT:      Mouth/Throat:      Mouth: Mucous membranes are moist.   Eyes:      Extraocular Movements: Extraocular movements intact. Pupils: Pupils are equal, round, and reactive to light. Cardiovascular:      Rate and Rhythm: Normal rate and regular rhythm. Heart sounds: Normal heart sounds. Pulmonary:      Effort: Pulmonary effort is normal. No respiratory distress. Breath sounds: Normal breath sounds. Abdominal:      General: Bowel sounds are normal. There is no distension. Palpations: Abdomen is soft. Tenderness: There is no abdominal tenderness. There is no rebound. Musculoskeletal:         General: Normal range of motion. Cervical back: Normal range of motion and neck supple. Skin:     General: Skin is warm and dry. Neurological:      Mental Status: She is alert. Intake and Output:  Current Shift:  No intake/output data recorded. Last three shifts:  No intake/output data recorded. Lab/Data Reviewed:  Recent Results (from the past 8 hour(s))   METABOLIC PANEL, COMPREHENSIVE    Collection Time: 02/05/23  5:34 AM   Result Value Ref Range    Sodium 133 (L) 136 - 145 mmol/L    Potassium 6.0 (H) 3.5 - 5.5 mmol/L    Chloride 105 100 - 111 mmol/L    CO2 23 21 - 32 mmol/L    Anion gap 5 3.0 - 18 mmol/L    Glucose 99 74 - 99 mg/dL    BUN 39 (H) 7.0 - 18 MG/DL    Creatinine 1.77 (H) 0.6 - 1.3 MG/DL    BUN/Creatinine ratio 22 (H) 12 - 20      eGFR 31 (L) >60 ml/min/1.73m2    Calcium 8.6 8.5 - 10.1 MG/DL    Bilirubin, total 0.4 0.2 - 1.0 MG/DL    ALT (SGPT) 26 13 - 56 U/L    AST (SGOT) 29 10 - 38 U/L    Alk. phosphatase 117 45 - 117 U/L    Protein, total 6.5 6.4 - 8.2 g/dL    Albumin 2.9 (L) 3.4 - 5.0 g/dL    Globulin 3.6 2.0 - 4.0 g/dL    A-G Ratio 0.8 0.8 - 1.7     CBC WITH AUTOMATED DIFF    Collection Time: 02/05/23  5:34 AM   Result Value Ref Range    WBC 6.5 4.6 - 13.2 K/uL    RBC 3.39 (L) 4.20 - 5.30 M/uL    HGB 9.1 (L) 12.0 - 16.0 g/dL    HCT 28.7 (L) 35.0 - 45.0 %    MCV 84.7 78.0 - 100.0 FL    MCH 26.8 24.0 - 34.0 PG    MCHC 31.7 31.0 - 37.0 g/dL    RDW 16.2 (H) 11.6 - 14.5 %    PLATELET 595 032 - 128 K/uL    MPV 9.8 9.2 - 11.8 FL    NRBC 0.0 0  WBC    ABSOLUTE NRBC 0.00 0.00 - 0.01 K/uL    NEUTROPHILS 80 (H) 40 - 73 %    LYMPHOCYTES 12 (L) 21 - 52 %    MONOCYTES 7 3 - 10 %    EOSINOPHILS 1 0 - 5 %    BASOPHILS 0 0 - 2 %    IMMATURE GRANULOCYTES 1 (H) 0.0 - 0.5 %    ABS. NEUTROPHILS 5.2 1.8 - 8.0 K/UL    ABS. LYMPHOCYTES 0.8 (L) 0.9 - 3.6 K/UL    ABS. MONOCYTES 0.5 0.05 - 1.2 K/UL    ABS. EOSINOPHILS 0.0 0.0 - 0.4 K/UL    ABS.  BASOPHILS 0.0 0.0 - 0.1 K/UL    ABS. IMM.  GRANS. 0.0 0.00 - 0.04 K/UL    DF AUTOMATED     MAGNESIUM    Collection Time: 02/05/23  5:34 AM   Result Value Ref Range    Magnesium 2.1 1.6 - 2.6 mg/dL   PHOSPHORUS    Collection Time: 02/05/23  5:34 AM   Result Value Ref Range    Phosphorus 4.2 2.5 - 4.9 MG/DL     Medications:  Current Facility-Administered Medications   Medication Dose Route Frequency    promethazine (PHENERGAN) 12.5 mg in 0.9% sodium chloride 50 mL IVPB  12.5 mg IntraVENous Q6H PRN    oxyCODONE IR (ROXICODONE) tablet 5 mg  5 mg Oral Q6H PRN    sodium chloride (NS) flush 5-40 mL  5-40 mL IntraVENous Q8H    sodium chloride (NS) flush 5-40 mL  5-40 mL IntraVENous PRN    acetaminophen (TYLENOL) tablet 650 mg  650 mg Oral Q6H PRN    Or    acetaminophen (TYLENOL) suppository 650 mg  650 mg Rectal Q6H PRN    polyethylene glycol (MIRALAX) packet 17 g  17 g Oral DAILY PRN    ondansetron (ZOFRAN ODT) tablet 4 mg  4 mg Oral Q8H PRN    Or    ondansetron (ZOFRAN) injection 4 mg  4 mg IntraVENous Q6H PRN    LORazepam (ATIVAN) tablet 1 mg  1 mg Oral Q4H PRN    atorvastatin (LIPITOR) tablet 40 mg  40 mg Oral QHS    amLODIPine (NORVASC) tablet 5 mg  5 mg Oral DAILY    ferrous sulfate tablet 325 mg  325 mg Oral BID WITH MEALS    polyethylene glycol (MIRALAX) packet 17 g  17 g Oral DAILY    simethicone (MYLICON) tablet 80 mg  80 mg Oral QID PRN    atenoloL (TENORMIN) tablet 100 mg  100 mg Oral DAILY       Recent Results (from the past 24 hour(s))   METABOLIC PANEL, COMPREHENSIVE    Collection Time: 02/05/23  5:34 AM   Result Value Ref Range    Sodium 133 (L) 136 - 145 mmol/L    Potassium 6.0 (H) 3.5 - 5.5 mmol/L    Chloride 105 100 - 111 mmol/L    CO2 23 21 - 32 mmol/L    Anion gap 5 3.0 - 18 mmol/L    Glucose 99 74 - 99 mg/dL    BUN 39 (H) 7.0 - 18 MG/DL    Creatinine 1.77 (H) 0.6 - 1.3 MG/DL    BUN/Creatinine ratio 22 (H) 12 - 20      eGFR 31 (L) >60 ml/min/1.73m2    Calcium 8.6 8.5 - 10.1 MG/DL    Bilirubin, total 0.4 0.2 - 1.0 MG/DL ALT (SGPT) 26 13 - 56 U/L    AST (SGOT) 29 10 - 38 U/L    Alk. phosphatase 117 45 - 117 U/L    Protein, total 6.5 6.4 - 8.2 g/dL    Albumin 2.9 (L) 3.4 - 5.0 g/dL    Globulin 3.6 2.0 - 4.0 g/dL    A-G Ratio 0.8 0.8 - 1.7     CBC WITH AUTOMATED DIFF    Collection Time: 02/05/23  5:34 AM   Result Value Ref Range    WBC 6.5 4.6 - 13.2 K/uL    RBC 3.39 (L) 4.20 - 5.30 M/uL    HGB 9.1 (L) 12.0 - 16.0 g/dL    HCT 28.7 (L) 35.0 - 45.0 %    MCV 84.7 78.0 - 100.0 FL    MCH 26.8 24.0 - 34.0 PG    MCHC 31.7 31.0 - 37.0 g/dL    RDW 16.2 (H) 11.6 - 14.5 %    PLATELET 135 118 - 267 K/uL    MPV 9.8 9.2 - 11.8 FL    NRBC 0.0 0  WBC    ABSOLUTE NRBC 0.00 0.00 - 0.01 K/uL    NEUTROPHILS 80 (H) 40 - 73 %    LYMPHOCYTES 12 (L) 21 - 52 %    MONOCYTES 7 3 - 10 %    EOSINOPHILS 1 0 - 5 %    BASOPHILS 0 0 - 2 %    IMMATURE GRANULOCYTES 1 (H) 0.0 - 0.5 %    ABS. NEUTROPHILS 5.2 1.8 - 8.0 K/UL    ABS. LYMPHOCYTES 0.8 (L) 0.9 - 3.6 K/UL    ABS. MONOCYTES 0.5 0.05 - 1.2 K/UL    ABS. EOSINOPHILS 0.0 0.0 - 0.4 K/UL    ABS. BASOPHILS 0.0 0.0 - 0.1 K/UL    ABS. IMM.  GRANS. 0.0 0.00 - 0.04 K/UL    DF AUTOMATED     MAGNESIUM    Collection Time: 02/05/23  5:34 AM   Result Value Ref Range    Magnesium 2.1 1.6 - 2.6 mg/dL   PHOSPHORUS    Collection Time: 02/05/23  5:34 AM   Result Value Ref Range    Phosphorus 4.2 2.5 - 4.9 MG/DL       Procedures/imaging: see electronic medical records for all procedures/Xrays and details which were not copied into this note but were reviewed prior to creation of Boy James MD   2/5/2023, 10:32 AM

## 2023-02-05 NOTE — PROGRESS NOTES
Tele tech reported a pause on patient monitor pause was 3.8 seconds long, assessed patient she verbalized no complaints reported one episode of emesis prior to pause vitals assessed notified Dr. Mary Pearce of findings, EKG done reported reading to MD, he ordered decrease in Atenolol starting tomorrow will continue to monitor.

## 2023-02-05 NOTE — PROGRESS NOTES
Patient complained of nausea when she went to the bathroom; Assessed patient and now resting in the bed; Offerred to give IV ondansetron but the patient refused for now; But advised to call when patient feeling of nausea get worse. 0005 patient still having nausea, IV ondansetron given;       0100 Patient had another episode of vomiting; Vital signs checked; patient denies headache, dizziness or abdominal pain; Informed patient that the doctor may prescribe another medicine for nausea and vomiting; Informed Hospitalist and orders received; Patient refused to have the phenergan as of the moment but explained that if still having nausea and vomiting the need for the medicine to be given; patient agreed; Kept monitored;    0245H Patient asleep;  No further episodes of vomiting noted; Kept monitored;

## 2023-02-05 NOTE — PROGRESS NOTES
Paged MD once pertaining to elevated Potassium level awaiting call back, charge nurse  paged MD twice awaiting return call nursing supervisor made aware of awaiting a reply from MD will continue to monitor

## 2023-02-05 NOTE — PROGRESS NOTES
Problem: Falls - Risk of  Goal: *Absence of Falls  Description: Document Talon Morel Fall Risk and appropriate interventions in the flowsheet.   Outcome: Progressing Towards Goal  Note: Fall Risk Interventions:            Medication Interventions: Assess postural VS orthostatic hypotension, Evaluate medications/consider consulting pharmacy, Patient to call before getting OOB, Teach patient to arise slowly                   Problem: Patient Education: Go to Patient Education Activity  Goal: Patient/Family Education  Outcome: Progressing Towards Goal     Problem: Patient Education: Go to Patient Education Activity  Goal: Patient/Family Education  Outcome: Progressing Towards Goal     Problem: Unstable angina/NSTEMI: Day of Admission/Day 1  Goal: Diagnostic Test/Procedures  Outcome: Progressing Towards Goal  Goal: Medications  Outcome: Progressing Towards Goal     Problem: Anemia Care Plan (Adult and Pediatric)  Goal: *Labs within defined limits  Outcome: Progressing Towards Goal  Goal: *Tolerates increased activity  Outcome: Progressing Towards Goal     Problem: Patient Education: Go to Patient Education Activity  Goal: Patient/Family Education  Outcome: Progressing Towards Goal     Problem: Patient Education: Go to Patient Education Activity  Goal: Patient/Family Education  Outcome: Progressing Towards Goal

## 2023-02-05 NOTE — PROGRESS NOTES
Problem: Falls - Risk of  Goal: *Absence of Falls  Description: Document Sunita Alvarez Fall Risk and appropriate interventions in the flowsheet.   2/5/2023 1404 by Queta Edmonds RN  Outcome: Progressing Towards Goal  Note: Fall Risk Interventions:            Medication Interventions: Bed/chair exit alarm                2/5/2023 1403 by Queta Edmonds RN  Outcome: Progressing Towards Goal  Note: Fall Risk Interventions:            Medication Interventions: Bed/chair exit alarm                   Problem: Patient Education: Go to Patient Education Activity  Goal: Patient/Family Education  2/5/2023 1404 by Queta Edmonds RN  Outcome: Progressing Towards Goal  2/5/2023 1403 by Queta Edmonds RN  Outcome: Progressing Towards Goal     Problem: Patient Education: Go to Patient Education Activity  Goal: Patient/Family Education  2/5/2023 1404 by Queta Edmonds RN  Outcome: Progressing Towards Goal  2/5/2023 1403 by Queta Edmonds RN  Outcome: Progressing Towards Goal     Problem: Unstable angina/NSTEMI: Day of Admission/Day 1  Goal: Diagnostic Test/Procedures  2/5/2023 1404 by Queta Edmonds RN  Outcome: Progressing Towards Goal  2/5/2023 1403 by Queta Edmonds RN  Outcome: Progressing Towards Goal  Goal: Medications  2/5/2023 1404 by Queta Edmonds RN  Outcome: Progressing Towards Goal  2/5/2023 1403 by Queta Edmonds RN  Outcome: Progressing Towards Goal     Problem: Anemia Care Plan (Adult and Pediatric)  Goal: *Labs within defined limits  2/5/2023 1404 by Queta Edmonds RN  Outcome: Progressing Towards Goal  2/5/2023 1403 by Queta Edmonds RN  Outcome: Progressing Towards Goal  Goal: *Tolerates increased activity  2/5/2023 1404 by Queta Edmonsd RN  Outcome: Progressing Towards Goal  2/5/2023 1403 by Queta Edmonds RN  Outcome: Progressing Towards Goal     Problem: Patient Education: Go to Patient Education Activity  Goal: Patient/Family Education  2/5/2023 1404 by Queta Edmonds RN  Outcome: Progressing Towards Goal  2/5/2023 1403 by Danni Lara RN  Outcome: Progressing Towards Goal     Problem: Patient Education: Go to Patient Education Activity  Goal: Patient/Family Education  2/5/2023 1404 by Danni Lara RN  Outcome: Progressing Towards Goal  2/5/2023 1403 by Danni Lara RN  Outcome: Progressing Towards Goal

## 2023-02-06 VITALS
TEMPERATURE: 99.1 F | RESPIRATION RATE: 17 BRPM | WEIGHT: 128.75 LBS | DIASTOLIC BLOOD PRESSURE: 64 MMHG | HEIGHT: 59 IN | OXYGEN SATURATION: 94 % | SYSTOLIC BLOOD PRESSURE: 154 MMHG | BODY MASS INDEX: 25.96 KG/M2 | HEART RATE: 73 BPM

## 2023-02-06 LAB
ATRIAL RATE: 127 BPM
ATRIAL RATE: 93 BPM
CALCULATED P AXIS, ECG09: 57 DEGREES
CALCULATED R AXIS, ECG10: 85 DEGREES
CALCULATED R AXIS, ECG10: 85 DEGREES
CALCULATED T AXIS, ECG11: 25 DEGREES
CALCULATED T AXIS, ECG11: 59 DEGREES
DIAGNOSIS, 93000: NORMAL
DIAGNOSIS, 93000: NORMAL
MAGNESIUM SERPL-MCNC: 2.1 MG/DL (ref 1.6–2.6)
P-R INTERVAL, ECG05: 156 MS
Q-T INTERVAL, ECG07: 310 MS
Q-T INTERVAL, ECG07: 350 MS
QRS DURATION, ECG06: 78 MS
QRS DURATION, ECG06: 82 MS
QTC CALCULATION (BEZET), ECG08: 435 MS
QTC CALCULATION (BEZET), ECG08: 448 MS
VENTRICULAR RATE, ECG03: 126 BPM
VENTRICULAR RATE, ECG03: 93 BPM

## 2023-02-06 PROCEDURE — 74011250637 HC RX REV CODE- 250/637: Performed by: INTERNAL MEDICINE

## 2023-02-06 PROCEDURE — 74011250637 HC RX REV CODE- 250/637: Performed by: HOSPITALIST

## 2023-02-06 PROCEDURE — 83735 ASSAY OF MAGNESIUM: CPT

## 2023-02-06 PROCEDURE — 74011000250 HC RX REV CODE- 250: Performed by: INTERNAL MEDICINE

## 2023-02-06 PROCEDURE — 36415 COLL VENOUS BLD VENIPUNCTURE: CPT

## 2023-02-06 RX ORDER — ATENOLOL 50 MG/1
50 TABLET ORAL DAILY
Qty: 30 TABLET | Refills: 2 | Status: SHIPPED | OUTPATIENT
Start: 2023-02-07

## 2023-02-06 RX ORDER — AMLODIPINE BESYLATE 10 MG/1
10 TABLET ORAL DAILY
Qty: 30 TABLET | Refills: 2 | Status: SHIPPED | OUTPATIENT
Start: 2023-02-07

## 2023-02-06 RX ADMIN — AMLODIPINE BESYLATE 10 MG: 5 TABLET ORAL at 09:00

## 2023-02-06 RX ADMIN — ATENOLOL 50 MG: 50 TABLET ORAL at 08:35

## 2023-02-06 RX ADMIN — SODIUM CHLORIDE, PRESERVATIVE FREE 10 ML: 5 INJECTION INTRAVENOUS at 05:56

## 2023-02-06 RX ADMIN — ACETAMINOPHEN 650 MG: 325 TABLET ORAL at 09:34

## 2023-02-06 RX ADMIN — POLYETHYLENE GLYCOL 3350 17 G: 17 POWDER, FOR SOLUTION ORAL at 08:35

## 2023-02-06 RX ADMIN — FERROUS SULFATE TAB 325 MG (65 MG ELEMENTAL FE) 325 MG: 325 (65 FE) TAB at 08:35

## 2023-02-06 NOTE — PROGRESS NOTES
Cardiology Progress Note        Patient: Erin Tesfaye        Sex: female          DOA: 2/2/2023  YOB: 1955      Age:  69 y.o.        LOS:  LOS: 2 days     Patient seen and examined, chart reviewed. Assessment/Plan     Patient Active Problem List   Diagnosis Code    Hypomagnesemia E83.42    Arrhythmia I49.9    Symptomatic anemia D64.9    QUINN (acute kidney injury) ([facility] [address] N17.9    Mass of pelvis R19.00    HTN (hypertension) I10    Elevated troponin R77.8    SVT (supraventricular tachycardia) ([facility]) I47.1      PSVT  Abnormal troponin: No clear evidence of ischemia, could be demand ischemia   Hyperkalemia -  Resolved       78-year-old female came with complaining of palpitation and found to have PSVT. She was converted to sinus rhythm with IV adenosine. Patient  was on atenolol for long time and it was changed to carvedilol recently. She  was started on 3.125 mg by mouth carvedilol and dose was increased to 6.25 mg. Patient and family member has concern that after she was placed on this medication she developed palpitation. Last episode of palpitation was in 2020. Currently she is complaining of palpitation. At monitor revealed PSVT with ventricular rate 126 beats per minute  TSH is normal    Telemetry monitor reviewed episode of PSVT at 120 bpm and Sinus pause up to 5 seconds when patient converted to sinus rhythm      Echocardiogram revealed       Left Ventricle: Normal left ventricular systolic function with a visually estimated EF of 55 - 60%. Left ventricle size is normal. Normal wall thickness. Normal wall motion. Grade I diastolic dysfunction present with normal LV EF. Mitral Valve: Mildly thickened leaflet. Mild to moderate regurgitation. Tricuspid Valve: Unable to assess RVSP due to inadequate or insignificant tricuspid regurgitation. Pulmonic Valve: The pulmonic valve was not well visualized. Mild regurgitation.         Telemetry monitor reviewed no episode of any significant bradycardia in last 24 hours, there was 1 episode of narrow complex tachycardia at 120 beats per minute last night    Plan:     Continue Atenolol to 50 mg once a day  Monitor heart rate/ blood pressure    Monitor potassium and magnesium. Okay to discharge from cardiac standpoint   Follow-up in Cardiology Clinic in 6 weeks after discharge. Plan discussed with patient and family member at bedside            Subjective:    cc:   Denies any chest pain or palpitation. I would like to go home today    REVIEW OF SYSTEMS:     General: No fevers or chills. Cardiovascular: No chest pain, positive palpitations, No orthopnea, No PND, No leg swelling, No claudication  Pulmonary: No  dyspnea. Gastrointestinal: No nausea, vomiting, bleeding  Neurology: No Dizziness    Objective:      Visit Vitals  BP (!) 154/64 (BP 1 Location: Left arm, BP Patient Position: At rest)   Pulse 73   Temp 99.1 °F (37.3 °C)   Resp 17   Ht 4' 11\" (1.499 m)   Wt 58.4 kg (128 lb 12 oz)   SpO2 94%   BMI 26.00 kg/m²     Body mass index is 26 kg/m². Physical Exam:  General Appearance: Comfortable, not using accessory muscles of respiration. HEENT: EUNICE. HEAD: Atraumatic  NECK: No JVD, no thyroidomeglay. CAROTIDS: no bruit  LUNGS: Clear bilaterally. HEART: S1+S2 audible, no murmur, no pericardial rub. ABD: Non-tender, BS Audible    EXT: No edema, and no cyanosis. VASCULAR EXAM: Pulses are intact. PSYCHIATRIC EXAM: Mood is appropriate. MUSCULOSKELETAL: Grossly no joint deformity.   NEUROLOGICAL: AAO times 3, No motor and sensory deficit    Medication:  Current Facility-Administered Medications   Medication Dose Route Frequency    promethazine (PHENERGAN) 12.5 mg in 0.9% sodium chloride 50 mL IVPB  12.5 mg IntraVENous Q6H PRN    atenoloL (TENORMIN) tablet 50 mg  50 mg Oral DAILY    amLODIPine (NORVASC) tablet 10 mg  10 mg Oral DAILY    oxyCODONE IR (ROXICODONE) tablet 5 mg  5 mg Oral Q6H PRN    sodium chloride (NS) flush 5-40 mL  5-40 mL IntraVENous Q8H    sodium chloride (NS) flush 5-40 mL  5-40 mL IntraVENous PRN    acetaminophen (TYLENOL) tablet 650 mg  650 mg Oral Q6H PRN    Or    acetaminophen (TYLENOL) suppository 650 mg  650 mg Rectal Q6H PRN    polyethylene glycol (MIRALAX) packet 17 g  17 g Oral DAILY PRN    ondansetron (ZOFRAN ODT) tablet 4 mg  4 mg Oral Q8H PRN    Or    ondansetron (ZOFRAN) injection 4 mg  4 mg IntraVENous Q6H PRN    LORazepam (ATIVAN) tablet 1 mg  1 mg Oral Q4H PRN    atorvastatin (LIPITOR) tablet 40 mg  40 mg Oral QHS    ferrous sulfate tablet 325 mg  325 mg Oral BID WITH MEALS    polyethylene glycol (MIRALAX) packet 17 g  17 g Oral DAILY    simethicone (MYLICON) tablet 80 mg  80 mg Oral QID PRN               Lab/Data Reviewed:       Recent Labs     02/05/23  0534 02/04/23  0057   WBC 6.5 4.3*   HGB 9.1* 9.3*   HCT 28.7* 28.7*    272       Recent Labs     02/05/23  1527 02/05/23  0534 02/04/23  0057   * 133* 135*   K 5.3 6.0* 4.7    105 106   CO2 22 23 22   * 99 130*   BUN 41* 39* 34*   CREA 1.80* 1.77* 1.53*   CA 8.6 8.6 8.8         Signed By: Giacomo Dan MD     February 6, 2023

## 2023-02-06 NOTE — DISCHARGE SUMMARY
[facility] SUMMARY    Name:  Adela Ramos  MR#:   [id number]  :  1955  ACCOUNT #:  [de-identified]  ADMIT DATE:  2023  DISCHARGE DATE:  2023      DISCHARGE DIAGNOSES:  1. Supraventricular tachycardia. 2.  Recent diagnosis of pelvic mass. 3.  Chronic anemia. 4.  Hypertension. CONSULTANTS:  Willam Garcia MD, Cardiology. HOSPITAL SUMMARY:  This is a vipul 68-year-old  female recently admitted for severe anemia and findings of a pelvic mass for which she is going to follow up with GYN Oncology outpatient. Before she had the chance to do that followup however, she came back into the hospital with palpitations and SVT. The patient had adjustments of her medications here. Initially was treated with adenosine in the emergency room. She does have a history of high blood pressure and was on Norvasc as well as Coreg previously which had been changed to atenolol. The patient had a fairly uneventful stay during this hospitalization. She is doing much better and she has been cleared by Cardiology for discharge today. Most recent labs include an H and H of 9.1 and 28.7. White blood cell count is normal.  Platelet count is normal.  Her chemistry has been unremarkable. Creatinine is elevated but at baseline at 1.80. LFTs were unremarkable and her troponin markers were slightly elevated on admission but then normalized. Her iron is low at 39. The patient had an echocardiogram on  that showed an ejection fraction of 55-60%. She had a VQ scan as well that showed low probability for pulmonary embolism and a duplex of her lower extremities that showed no evidence for DVT in either leg. She has been stable on cardiac monitoring. She is now on the following meds for her heart rate and blood pressure:  Norvasc 10 mg daily and atenolol 50 mg daily. This morning she has no complaints of chest pain or palpitations. She is doing well.   She has no abdominal pain. Blood pressure is 120/63 to 154/64, pulse is 73, temperature 99.1, respiratory rate 17, SaO2 is 94% on room air. Her daughter is here interpreting for her per her choice. Her lungs are clear bilaterally. Cardiac exam, regular rate and rhythm. Abdomen benign. Lower extremities without edema. She has a followup coming up with Dr. Aries Downs this Friday and they are going to hopefully make arrangements for GYN Oncology as an outpatient. The daughter knows that this has to be done shortly for the pelvic mass that was diagnosed  last admission and she definitely has to get that followed up within the next week or two as there is suspicion that could be a malignant lesion. The patient and daughter are all aware of that plan and at this point she is stable for discharge to home. DISCHARGE MEDICATIONS:  She will be on the following meds:  1. Norvasc 10 mg daily. 2.  Atenolol 50 mg daily. 3.  Lipitor 40 mg at night. 4.  Ferrous sulfate 325 mg twice daily. Follow up this Friday on 02/10 with Dr. Aries Downs, Dr. Estrella Hernandez on 02/09 at 02:15, and Dr. Mercy Interiano in 6 weeks. GYN Oncology referral from her PCP, please as soon as possible. 35 minutes on discharge time today. She is going home in stable condition on regular diet as tolerated. Continue MiraLax daily to help prevent constipation. Activity as tolerated.       Scott Aragon MD      [id number]  D:  02/06/2023 14:40  T:  02/06/2023 17:40  JOB #:  [id number]  CC:  Yulisa El MD

## 2023-02-06 NOTE — ROUTINE PROCESS
Bedside and Verbal shift change report given to Geoff Branham RN (oncoming nurse) by Orlando Spencer RN (offgoing nurse). Report included the following information SBAR, Kardex, MAR, and Cardiac Rhythm NSR to Andres Gaston .

## 2023-02-06 NOTE — PROGRESS NOTES
Cardiology Progress Note        Patient: Heber Bernheim        Sex: female          DOA: 2/2/2023  YOB: 1955      Age:  69 y.o.        LOS:  LOS: 1 day     Patient seen and examined, chart reviewed. Assessment/Plan     Patient Active Problem List   Diagnosis Code    Hypomagnesemia E83.42    Arrhythmia I49.9    Symptomatic anemia D64.9    QUINN (acute kidney injury) ([facility] [address] N17.9    Mass of pelvis R19.00    HTN (hypertension) I10    Elevated troponin R77.8    SVT (supraventricular tachycardia) ([facility]) I47.1      PSVT  Abnormal troponin: No clear evidence of ischemia, could be demand ischemia   Hyperkalemia       20-year-old female came with complaining of palpitation and found to have PSVT. She was converted to sinus rhythm with IV adenosine. Patient  was on atenolol for long time and it was changed to carvedilol recently. She  was started on 3.125 mg by mouth carvedilol and dose was increased to 6.25 mg. Patient and family member has concern that after she was placed on this medication she developed palpitation. Last episode of palpitation was in 2020. Currently she is complaining of palpitation. At monitor revealed PSVT with ventricular rate 126 beats per minute  TSH is normal    Telemetry monitor reviewed episode of PSVT at 120 bpm and Sinus pause up to 5 seconds when patient converted to sinus rhythm      Echocardiogram revealed       Left Ventricle: Normal left ventricular systolic function with a visually estimated EF of 55 - 60%. Left ventricle size is normal. Normal wall thickness. Normal wall motion. Grade I diastolic dysfunction present with normal LV EF. Mitral Valve: Mildly thickened leaflet. Mild to moderate regurgitation. Tricuspid Valve: Unable to assess RVSP due to inadequate or insignificant tricuspid regurgitation. Pulmonic Valve: The pulmonic valve was not well visualized. Mild regurgitation.         Plan:     Change Atenolol to 50 mg once a day due to bradycardia   Monitor heart rate/ blood pressure    Monitor potassium and magnesium. Continue telemetry monitoring. Plan discussed with patient and family member at bedside            Subjective:    cc:   Denies any chest pain or palpitation. This morning I had episode of palpitation      REVIEW OF SYSTEMS:     General: No fevers or chills. Cardiovascular: No chest pain, positive palpitations, No orthopnea, No PND, No leg swelling, No claudication  Pulmonary: No  dyspnea. Gastrointestinal: No nausea, vomiting, bleeding  Neurology: No Dizziness    Objective:      Visit Vitals  BP (!) 153/53 (BP 1 Location: Left upper arm, BP Patient Position: At rest)   Pulse 62   Temp 98.3 °F (36.8 °C)   Resp 20   Ht 4' 11\" (1.499 m)   Wt 55.3 kg (122 lb)   SpO2 96%   BMI 24.64 kg/m²     Body mass index is 24.64 kg/m². Physical Exam:  General Appearance: Comfortable, not using accessory muscles of respiration. HEENT: EUNICE. HEAD: Atraumatic  NECK: No JVD, no thyroidomeglay. CAROTIDS: no bruit  LUNGS: Clear bilaterally. HEART: S1+S2 audible, no murmur, no pericardial rub. ABD: Non-tender, BS Audible    EXT: No edema, and no cyanosis. VASCULAR EXAM: Pulses are intact. PSYCHIATRIC EXAM: Mood is appropriate. MUSCULOSKELETAL: Grossly no joint deformity.   NEUROLOGICAL: AAO times 3, No motor and sensory deficit    Medication:  Current Facility-Administered Medications   Medication Dose Route Frequency    promethazine (PHENERGAN) 12.5 mg in 0.9% sodium chloride 50 mL IVPB  12.5 mg IntraVENous Q6H PRN    [START ON 2/6/2023] atenoloL (TENORMIN) tablet 50 mg  50 mg Oral DAILY    [START ON 2/6/2023] amLODIPine (NORVASC) tablet 10 mg  10 mg Oral DAILY    oxyCODONE IR (ROXICODONE) tablet 5 mg  5 mg Oral Q6H PRN    sodium chloride (NS) flush 5-40 mL  5-40 mL IntraVENous Q8H    sodium chloride (NS) flush 5-40 mL  5-40 mL IntraVENous PRN    acetaminophen (TYLENOL) tablet 650 mg  650 mg Oral Q6H PRN    Or acetaminophen (TYLENOL) suppository 650 mg  650 mg Rectal Q6H PRN    polyethylene glycol (MIRALAX) packet 17 g  17 g Oral DAILY PRN    ondansetron (ZOFRAN ODT) tablet 4 mg  4 mg Oral Q8H PRN    Or    ondansetron (ZOFRAN) injection 4 mg  4 mg IntraVENous Q6H PRN    LORazepam (ATIVAN) tablet 1 mg  1 mg Oral Q4H PRN    atorvastatin (LIPITOR) tablet 40 mg  40 mg Oral QHS    ferrous sulfate tablet 325 mg  325 mg Oral BID WITH MEALS    polyethylene glycol (MIRALAX) packet 17 g  17 g Oral DAILY    simethicone (MYLICON) tablet 80 mg  80 mg Oral QID PRN               Lab/Data Reviewed:       Recent Labs     02/05/23  0534 02/04/23 0057 02/03/23 0621   WBC 6.5 4.3* 3.5*   HGB 9.1* 9.3* 9.4*   HCT 28.7* 28.7* 29.1*    272 285       Recent Labs     02/05/23  1527 02/05/23 0534 02/04/23 0057   * 133* 135*   K 5.3 6.0* 4.7    105 106   CO2 22 23 22   * 99 130*   BUN 41* 39* 34*   CREA 1.80* 1.77* 1.53*   CA 8.6 8.6 8.8         Signed By: Disha Escobar MD     February 5, 2023

## 2023-02-06 NOTE — PROGRESS NOTES
Patient discharged to home with written and verbal instructions provided. Patient and family member acknowledge understanding. Patient and belongings transported to awaiting POV via wheelchair.

## 2023-02-06 NOTE — PROGRESS NOTES
Problem: Falls - Risk of  Goal: *Absence of Falls  Description: Document Angella All Fall Risk and appropriate interventions in the flowsheet.   Outcome: Progressing Towards Goal  Note: Fall Risk Interventions:            Medication Interventions: Bed/chair exit alarm                   Problem: Patient Education: Go to Patient Education Activity  Goal: Patient/Family Education  Outcome: Progressing Towards Goal     Problem: Patient Education: Go to Patient Education Activity  Goal: Patient/Family Education  Outcome: Progressing Towards Goal     Problem: Unstable angina/NSTEMI: Day of Admission/Day 1  Goal: Diagnostic Test/Procedures  Outcome: Progressing Towards Goal  Goal: Medications  Outcome: Progressing Towards Goal     Problem: Anemia Care Plan (Adult and Pediatric)  Goal: *Labs within defined limits  Outcome: Progressing Towards Goal  Goal: *Tolerates increased activity  Outcome: Progressing Towards Goal     Problem: Patient Education: Go to Patient Education Activity  Goal: Patient/Family Education  Outcome: Progressing Towards Goal     Problem: Patient Education: Go to Patient Education Activity  Goal: Patient/Family Education  Outcome: Progressing Towards Goal

## 2023-02-10 NOTE — PROGRESS NOTES
Physician Progress Note      PATIENT:               Tristan Mauro  [facility] #:                  [id number]  :                       1955  ADMIT DATE:       2023 7:52 PM  Edgar Mata Covert DATE:        2023 2:41 PM  RESPONDING  PROVIDER #:        Breann Edwards MD          QUERY TEXT:    Patient admitted with SVT. Noted documentation of Acute Kidney Injury in with a creatinine of 1.42 that decreased to 1.26 and was 1.80 last check. Discharge summary noted, \"Creatinine is elevated but at baseline at 1.80. \"  In order to support the diagnosis of QUINN, please include additional clinical indicators in your documentation. ? Or please document if the diagnosis of QUINN has been ruled out after further study. The medical record reflects the following:    Risk Factors: Anemia, HTN, SVT    Clinical Indicators:  Creatinine of 1.42 that decreased to 1.26 and was 1.80 last check. Discharge summary noted, \"Creatinine is elevated but at baseline at 1.80. \"    Treatment: Receiving IV NS    Defined by Kidney Disease Improving Global Outcomes (KDIGO) clinical practice guideline for acute kidney injury:  -Increase in SCr by greater than or equal to 0.3 mg/dl within 48 hours; or  -Increase or decrease in SCr to greater than or equal to 1.5 times baseline, which is known or presumed to have occurred within the prior 7 days; or  -Urine volume < 0.5ml/kg/h for 6 hours. Yousif Pan RN, BSN, [facility], [facility] / [address], [address] [email or web address]  Options provided:  -- Acute kidney injury evidenced by (please document evidence), Please document evidence as well as a numerical baseline creatinine, if known.   -- Acute kidney injury ruled out after study  -- Other - I will add my own diagnosis  -- Disagree - Not applicable / Not valid  -- Disagree - Clinically unable to determine / Unknown  -- Refer to Clinical Documentation Reviewer    PROVIDER RESPONSE TEXT:    Acute kidney injury was ruled out after study.     Query created by: Beltran Charles on 2/10/2023 9:37 AM      Electronically signed by:  Ricarda Alex MD 2/10/2023 9:45 AM

## 2023-02-10 NOTE — PROGRESS NOTES
Physician Progress Note      PATIENT:               Tae Johns  CSN #:                  [id number]  :                       1955  ADMIT DATE:       2023 7:52 PM  [address] [place] DATE:        2023 2:41 PM  RESPONDING  PROVIDER #:        Jazmín Ramachandran MD          QUERY TEXT:    Pt admitted with SVT. Noted documentation in the PN of  \"Abnormal troponin: No clear evidence of  ischemia, could be demand ischemia. \" Troponin was 32 and increased to 119 before decreasing to  47. After study, did this patient have demand ischemia? The medical record reflects the following:    Risk Factors: SVT, HTN    Clinical Indicators:  Noted documentation in the PN of \"Abnormal troponin: No clear evidence of  ischemia, could be demand ischemia. \"  Troponin was 32 and increased to 119 before decreasing to 47. Treatment: Receiving Cardiology    Elsi Shields RN, BSN, [facility] / [address], [address] [email or web address]  Options provided:  -- Demand ischemia ruled in  -- Demand ischemia ruled out  -- Other - I will add my own diagnosis  -- Disagree - Not applicable / Not valid  -- Disagree - Clinically unable to determine / Unknown  -- Refer to Clinical Documentation Reviewer    PROVIDER RESPONSE TEXT:    Demand ischemia ruled in.     Query created by: Trevor Schmidt on 2/10/2023 9:51 AM      Electronically signed by:  Jazmín Ramachandran MD 2/10/2023 2:02 PM

## 2023-02-15 LAB
ATRIAL RATE: 127 BPM
ATRIAL RATE: 59 BPM
ATRIAL RATE: 89 BPM
ATRIAL RATE: 93 BPM
CALCULATED P AXIS, ECG09: 57 DEGREES
CALCULATED P AXIS, ECG09: 59 DEGREES
CALCULATED P AXIS, ECG09: 72 DEGREES
CALCULATED R AXIS, ECG10: 120 DEGREES
CALCULATED R AXIS, ECG10: 84 DEGREES
CALCULATED R AXIS, ECG10: 85 DEGREES
CALCULATED T AXIS, ECG11: 48 DEGREES
CALCULATED T AXIS, ECG11: 59 DEGREES
CALCULATED T AXIS, ECG11: 65 DEGREES
DIAGNOSIS, 93000: NORMAL
P-R INTERVAL, ECG05: 134 MS
P-R INTERVAL, ECG05: 156 MS
P-R INTERVAL, ECG05: 158 MS
Q-T INTERVAL, ECG07: 268 MS
Q-T INTERVAL, ECG07: 310 MS
Q-T INTERVAL, ECG07: 350 MS
Q-T INTERVAL, ECG07: 356 MS
Q-T INTERVAL, ECG07: 414 MS
QRS DURATION, ECG06: 78 MS
QRS DURATION, ECG06: 78 MS
QRS DURATION, ECG06: 82 MS
QRS DURATION, ECG06: 82 MS
QRS DURATION, ECG06: 84 MS
QTC CALCULATION (BEZET), ECG08: 409 MS
QTC CALCULATION (BEZET), ECG08: 433 MS
QTC CALCULATION (BEZET), ECG08: 435 MS
QTC CALCULATION (BEZET), ECG08: 441 MS
QTC CALCULATION (BEZET), ECG08: 448 MS
VENTRICULAR RATE, ECG03: 126 BPM
VENTRICULAR RATE, ECG03: 163 BPM
VENTRICULAR RATE, ECG03: 59 BPM
VENTRICULAR RATE, ECG03: 89 BPM
VENTRICULAR RATE, ECG03: 93 BPM

## 2023-03-05 PROBLEM — R79.89 ELEVATED TROPONIN: Status: RESOLVED | Noted: 2023-02-03 | Resolved: 2023-03-05

## 2023-03-05 PROBLEM — R77.8 ELEVATED TROPONIN: Status: RESOLVED | Noted: 2023-02-03 | Resolved: 2023-03-05

## 2023-03-28 ENCOUNTER — APPOINTMENT (OUTPATIENT)
Facility: HOSPITAL | Age: 68
End: 2023-03-28
Payer: MEDICARE

## 2023-03-28 ENCOUNTER — HOSPITAL ENCOUNTER (INPATIENT)
Facility: HOSPITAL | Age: 68
LOS: 3 days | Discharge: HOME | End: 2023-03-31
Attending: EMERGENCY MEDICINE | Admitting: INTERNAL MEDICINE
Payer: MEDICARE

## 2023-03-28 DIAGNOSIS — E83.42 HYPOMAGNESEMIA: ICD-10-CM

## 2023-03-28 DIAGNOSIS — I47.1 PAROXYSMAL SUPRAVENTRICULAR TACHYCARDIA (HCC): ICD-10-CM

## 2023-03-28 DIAGNOSIS — R79.89 POSITIVE D DIMER: Primary | ICD-10-CM

## 2023-03-28 DIAGNOSIS — E87.6 HYPOKALEMIA: ICD-10-CM

## 2023-03-28 DIAGNOSIS — I50.9 CONGESTIVE HEART FAILURE, UNSPECIFIED HF CHRONICITY, UNSPECIFIED HEART FAILURE TYPE (HCC): ICD-10-CM

## 2023-03-28 PROBLEM — D64.9 SYMPTOMATIC ANEMIA: Status: ACTIVE | Noted: 2023-01-30

## 2023-03-28 PROBLEM — R19.00 MASS OF PELVIS: Status: ACTIVE | Noted: 2023-01-31

## 2023-03-28 PROBLEM — I10 HTN (HYPERTENSION): Status: ACTIVE | Noted: 2023-01-31

## 2023-03-28 PROBLEM — I49.9 ARRHYTHMIA: Status: ACTIVE | Noted: 2021-03-11

## 2023-03-28 LAB
ALBUMIN SERPL-MCNC: 2.6 G/DL (ref 3.4–5)
ALBUMIN/GLOB SERPL: 0.6 (ref 0.8–1.7)
ALP SERPL-CCNC: 154 U/L (ref 45–117)
ALT SERPL-CCNC: 24 U/L (ref 13–56)
ANION GAP SERPL CALC-SCNC: 5 MMOL/L (ref 3–18)
ANION GAP SERPL CALC-SCNC: 6 MMOL/L (ref 3–18)
APPEARANCE UR: CLEAR
AST SERPL-CCNC: 33 U/L (ref 10–38)
BACTERIA URNS QL MICRO: ABNORMAL /HPF
BASOPHILS # BLD: 0 K/UL (ref 0–0.1)
BASOPHILS NFR BLD: 0 % (ref 0–2)
BILIRUB SERPL-MCNC: 0.4 MG/DL (ref 0.2–1)
BILIRUB UR QL: NEGATIVE
BUN SERPL-MCNC: 16 MG/DL (ref 7–18)
BUN SERPL-MCNC: 17 MG/DL (ref 7–18)
BUN/CREAT SERPL: 15 (ref 12–20)
BUN/CREAT SERPL: 16 (ref 12–20)
CALCIUM SERPL-MCNC: 7.8 MG/DL (ref 8.5–10.1)
CALCIUM SERPL-MCNC: 7.9 MG/DL (ref 8.5–10.1)
CHLORIDE SERPL-SCNC: 105 MMOL/L (ref 100–111)
CHLORIDE SERPL-SCNC: 108 MMOL/L (ref 100–111)
CO2 SERPL-SCNC: 24 MMOL/L (ref 21–32)
CO2 SERPL-SCNC: 25 MMOL/L (ref 21–32)
COLOR UR: YELLOW
CREAT SERPL-MCNC: 1.05 MG/DL (ref 0.6–1.3)
CREAT SERPL-MCNC: 1.06 MG/DL (ref 0.6–1.3)
D DIMER PPP FEU-MCNC: 13.04 UG/ML(FEU)
DIFFERENTIAL METHOD BLD: ABNORMAL
ECHO BSA: 1.49 M2
EOSINOPHIL # BLD: 0 K/UL (ref 0–0.4)
EOSINOPHIL NFR BLD: 1 % (ref 0–5)
EPITH CASTS URNS QL MICRO: ABNORMAL /LPF (ref 0–5)
ERYTHROCYTE [DISTWIDTH] IN BLOOD BY AUTOMATED COUNT: 15.9 % (ref 11.6–14.5)
FERRITIN SERPL-MCNC: 491 NG/ML (ref 8–388)
FOLATE SERPL-MCNC: 17.6 NG/ML (ref 3.1–17.5)
GLOBULIN SER CALC-MCNC: 4.4 G/DL (ref 2–4)
GLUCOSE BLD STRIP.AUTO-MCNC: 133 MG/DL (ref 70–110)
GLUCOSE SERPL-MCNC: 119 MG/DL (ref 74–99)
GLUCOSE SERPL-MCNC: 148 MG/DL (ref 74–99)
GLUCOSE UR STRIP.AUTO-MCNC: NEGATIVE MG/DL
HCT VFR BLD AUTO: 26.1 % (ref 35–45)
HGB BLD-MCNC: 8.2 G/DL (ref 12–16)
HGB UR QL STRIP: ABNORMAL
IMM GRANULOCYTES # BLD AUTO: 0 K/UL (ref 0–0.04)
IMM GRANULOCYTES NFR BLD AUTO: 1 % (ref 0–0.5)
IRON SATN MFR SERPL: 10 % (ref 20–50)
IRON SERPL-MCNC: 21 UG/DL (ref 50–175)
KETONES UR QL STRIP.AUTO: NEGATIVE MG/DL
LEUKOCYTE ESTERASE UR QL STRIP.AUTO: ABNORMAL
LYMPHOCYTES # BLD: 0.7 K/UL (ref 0.9–3.6)
LYMPHOCYTES NFR BLD: 14 % (ref 21–52)
MAGNESIUM SERPL-MCNC: 1.2 MG/DL (ref 1.6–2.6)
MAGNESIUM SERPL-MCNC: 1.9 MG/DL (ref 1.6–2.6)
MCH RBC QN AUTO: 25.1 PG (ref 24–34)
MCHC RBC AUTO-ENTMCNC: 31.4 G/DL (ref 31–37)
MCV RBC AUTO: 79.8 FL (ref 78–100)
MONOCYTES # BLD: 0.1 K/UL (ref 0.05–1.2)
MONOCYTES NFR BLD: 3 % (ref 3–10)
NEUTS SEG # BLD: 3.9 K/UL (ref 1.8–8)
NEUTS SEG NFR BLD: 81 % (ref 40–73)
NITRITE UR QL STRIP.AUTO: POSITIVE
NRBC # BLD: 0 K/UL (ref 0–0.01)
NRBC BLD-RTO: 0 PER 100 WBC
NT PRO BNP: 2965 PG/ML (ref 0–900)
PH UR STRIP: 5.5 (ref 5–8)
PLATELET # BLD AUTO: 367 K/UL (ref 135–420)
PMV BLD AUTO: 9.1 FL (ref 9.2–11.8)
POTASSIUM SERPL-SCNC: 3.1 MMOL/L (ref 3.5–5.5)
POTASSIUM SERPL-SCNC: 3.6 MMOL/L (ref 3.5–5.5)
PROT SERPL-MCNC: 7 G/DL (ref 6.4–8.2)
PROT UR STRIP-MCNC: 30 MG/DL
RBC # BLD AUTO: 3.27 M/UL (ref 4.2–5.3)
RBC #/AREA URNS HPF: ABNORMAL /HPF (ref 0–5)
SODIUM SERPL-SCNC: 136 MMOL/L (ref 136–145)
SODIUM SERPL-SCNC: 137 MMOL/L (ref 136–145)
SP GR UR REFRACTOMETRY: 1.01 (ref 1–1.03)
TIBC SERPL-MCNC: 214 UG/DL (ref 250–450)
TROPONIN I SERPL HS-MCNC: 16 NG/L (ref 0–54)
TROPONIN I SERPL HS-MCNC: 17 NG/L (ref 0–54)
TROPONIN I SERPL HS-MCNC: 17 NG/L (ref 0–54)
TSH SERPL DL<=0.05 MIU/L-ACNC: 1.59 UIU/ML (ref 0.36–3.74)
UROBILINOGEN UR QL STRIP.AUTO: 1 EU/DL (ref 0.2–1)
VIT B12 SERPL-MCNC: 155 PG/ML (ref 211–911)
WBC # BLD AUTO: 4.9 K/UL (ref 4.6–13.2)
WBC URNS QL MICRO: ABNORMAL /HPF (ref 0–5)

## 2023-03-28 PROCEDURE — 6370000000 HC RX 637 (ALT 250 FOR IP): Performed by: INTERNAL MEDICINE

## 2023-03-28 PROCEDURE — 99285 EMERGENCY DEPT VISIT HI MDM: CPT

## 2023-03-28 PROCEDURE — 71275 CT ANGIOGRAPHY CHEST: CPT

## 2023-03-28 PROCEDURE — 71045 X-RAY EXAM CHEST 1 VIEW: CPT

## 2023-03-28 PROCEDURE — 80053 COMPREHEN METABOLIC PANEL: CPT

## 2023-03-28 PROCEDURE — 93005 ELECTROCARDIOGRAM TRACING: CPT | Performed by: EMERGENCY MEDICINE

## 2023-03-28 PROCEDURE — 82962 GLUCOSE BLOOD TEST: CPT

## 2023-03-28 PROCEDURE — 96375 TX/PRO/DX INJ NEW DRUG ADDON: CPT

## 2023-03-28 PROCEDURE — 97161 PT EVAL LOW COMPLEX 20 MIN: CPT

## 2023-03-28 PROCEDURE — 83540 ASSAY OF IRON: CPT

## 2023-03-28 PROCEDURE — 83735 ASSAY OF MAGNESIUM: CPT

## 2023-03-28 PROCEDURE — 93970 EXTREMITY STUDY: CPT

## 2023-03-28 PROCEDURE — 81001 URINALYSIS AUTO W/SCOPE: CPT

## 2023-03-28 PROCEDURE — 87086 URINE CULTURE/COLONY COUNT: CPT

## 2023-03-28 PROCEDURE — 87186 SC STD MICRODIL/AGAR DIL: CPT

## 2023-03-28 PROCEDURE — 82728 ASSAY OF FERRITIN: CPT

## 2023-03-28 PROCEDURE — 2500000003 HC RX 250 WO HCPCS: Performed by: EMERGENCY MEDICINE

## 2023-03-28 PROCEDURE — 2580000003 HC RX 258: Performed by: EMERGENCY MEDICINE

## 2023-03-28 PROCEDURE — 6370000000 HC RX 637 (ALT 250 FOR IP): Performed by: EMERGENCY MEDICINE

## 2023-03-28 PROCEDURE — 84443 ASSAY THYROID STIM HORMONE: CPT

## 2023-03-28 PROCEDURE — 84484 ASSAY OF TROPONIN QUANT: CPT

## 2023-03-28 PROCEDURE — 85025 COMPLETE CBC W/AUTO DIFF WBC: CPT

## 2023-03-28 PROCEDURE — 83880 ASSAY OF NATRIURETIC PEPTIDE: CPT

## 2023-03-28 PROCEDURE — 96366 THER/PROPH/DIAG IV INF ADDON: CPT

## 2023-03-28 PROCEDURE — 96365 THER/PROPH/DIAG IV INF INIT: CPT

## 2023-03-28 PROCEDURE — 6360000004 HC RX CONTRAST MEDICATION: Performed by: EMERGENCY MEDICINE

## 2023-03-28 PROCEDURE — 36415 COLL VENOUS BLD VENIPUNCTURE: CPT

## 2023-03-28 PROCEDURE — 82607 VITAMIN B-12: CPT

## 2023-03-28 PROCEDURE — 6360000002 HC RX W HCPCS: Performed by: INTERNAL MEDICINE

## 2023-03-28 PROCEDURE — 87077 CULTURE AEROBIC IDENTIFY: CPT

## 2023-03-28 PROCEDURE — 2580000003 HC RX 258: Performed by: INTERNAL MEDICINE

## 2023-03-28 PROCEDURE — 1100000003 HC PRIVATE W/ TELEMETRY

## 2023-03-28 PROCEDURE — 6360000002 HC RX W HCPCS: Performed by: EMERGENCY MEDICINE

## 2023-03-28 PROCEDURE — 85379 FIBRIN DEGRADATION QUANT: CPT

## 2023-03-28 RX ORDER — ATORVASTATIN CALCIUM 20 MG/1
40 TABLET, FILM COATED ORAL NIGHTLY
Status: DISCONTINUED | OUTPATIENT
Start: 2023-03-28 | End: 2023-03-31 | Stop reason: HOSPADM

## 2023-03-28 RX ORDER — MAGNESIUM SULFATE IN WATER 40 MG/ML
2000 INJECTION, SOLUTION INTRAVENOUS ONCE
Status: COMPLETED | OUTPATIENT
Start: 2023-03-28 | End: 2023-03-28

## 2023-03-28 RX ORDER — POTASSIUM CHLORIDE 20 MEQ/1
20 TABLET, EXTENDED RELEASE ORAL
Status: COMPLETED | OUTPATIENT
Start: 2023-03-28 | End: 2023-03-28

## 2023-03-28 RX ORDER — ATENOLOL 50 MG/1
50 TABLET ORAL DAILY
Status: CANCELLED | OUTPATIENT
Start: 2023-03-28

## 2023-03-28 RX ORDER — ADENOSINE 3 MG/ML
6 INJECTION, SOLUTION INTRAVENOUS ONCE
Status: COMPLETED | OUTPATIENT
Start: 2023-03-28 | End: 2023-03-28

## 2023-03-28 RX ORDER — ACETAMINOPHEN 325 MG/1
650 TABLET ORAL EVERY 6 HOURS PRN
Status: DISCONTINUED | OUTPATIENT
Start: 2023-03-28 | End: 2023-03-31 | Stop reason: HOSPADM

## 2023-03-28 RX ORDER — ACETAMINOPHEN 650 MG/1
650 SUPPOSITORY RECTAL EVERY 6 HOURS PRN
Status: DISCONTINUED | OUTPATIENT
Start: 2023-03-28 | End: 2023-03-31 | Stop reason: HOSPADM

## 2023-03-28 RX ORDER — DILTIAZEM HYDROCHLORIDE 5 MG/ML
10 INJECTION INTRAVENOUS
Status: COMPLETED | OUTPATIENT
Start: 2023-03-28 | End: 2023-03-28

## 2023-03-28 RX ORDER — SODIUM CHLORIDE 0.9 % (FLUSH) 0.9 %
5-40 SYRINGE (ML) INJECTION EVERY 12 HOURS SCHEDULED
Status: DISCONTINUED | OUTPATIENT
Start: 2023-03-28 | End: 2023-03-31 | Stop reason: HOSPADM

## 2023-03-28 RX ORDER — ADENOSINE 3 MG/ML
12 INJECTION, SOLUTION INTRAVENOUS ONCE
Status: COMPLETED | OUTPATIENT
Start: 2023-03-28 | End: 2023-03-28

## 2023-03-28 RX ORDER — POLYETHYLENE GLYCOL 3350 17 G/17G
17 POWDER, FOR SOLUTION ORAL DAILY PRN
Status: DISCONTINUED | OUTPATIENT
Start: 2023-03-28 | End: 2023-03-31 | Stop reason: HOSPADM

## 2023-03-28 RX ORDER — SODIUM CHLORIDE 0.9 % (FLUSH) 0.9 %
5-40 SYRINGE (ML) INJECTION PRN
Status: DISCONTINUED | OUTPATIENT
Start: 2023-03-28 | End: 2023-03-31 | Stop reason: HOSPADM

## 2023-03-28 RX ORDER — FUROSEMIDE 10 MG/ML
20 INJECTION INTRAMUSCULAR; INTRAVENOUS
Status: COMPLETED | OUTPATIENT
Start: 2023-03-28 | End: 2023-03-28

## 2023-03-28 RX ORDER — DILTIAZEM HYDROCHLORIDE 120 MG/1
120 CAPSULE, COATED, EXTENDED RELEASE ORAL DAILY
Status: DISCONTINUED | OUTPATIENT
Start: 2023-03-28 | End: 2023-03-29

## 2023-03-28 RX ORDER — ONDANSETRON 4 MG/1
4 TABLET, ORALLY DISINTEGRATING ORAL EVERY 8 HOURS PRN
Status: DISCONTINUED | OUTPATIENT
Start: 2023-03-28 | End: 2023-03-31 | Stop reason: HOSPADM

## 2023-03-28 RX ORDER — SODIUM CHLORIDE 9 MG/ML
INJECTION, SOLUTION INTRAVENOUS PRN
Status: DISCONTINUED | OUTPATIENT
Start: 2023-03-28 | End: 2023-03-31 | Stop reason: HOSPADM

## 2023-03-28 RX ORDER — ENOXAPARIN SODIUM 100 MG/ML
40 INJECTION SUBCUTANEOUS DAILY
Status: DISCONTINUED | OUTPATIENT
Start: 2023-03-28 | End: 2023-03-31 | Stop reason: HOSPADM

## 2023-03-28 RX ORDER — PANTOPRAZOLE SODIUM 40 MG/1
40 TABLET, DELAYED RELEASE ORAL DAILY
Status: DISCONTINUED | OUTPATIENT
Start: 2023-03-28 | End: 2023-03-31 | Stop reason: HOSPADM

## 2023-03-28 RX ORDER — FUROSEMIDE 10 MG/ML
20 INJECTION INTRAMUSCULAR; INTRAVENOUS 2 TIMES DAILY
Status: DISCONTINUED | OUTPATIENT
Start: 2023-03-28 | End: 2023-03-30

## 2023-03-28 RX ORDER — ONDANSETRON 2 MG/ML
4 INJECTION INTRAMUSCULAR; INTRAVENOUS EVERY 6 HOURS PRN
Status: DISCONTINUED | OUTPATIENT
Start: 2023-03-28 | End: 2023-03-31 | Stop reason: HOSPADM

## 2023-03-28 RX ORDER — SPIRONOLACTONE 25 MG/1
25 TABLET ORAL DAILY
Status: DISCONTINUED | OUTPATIENT
Start: 2023-03-28 | End: 2023-03-31 | Stop reason: HOSPADM

## 2023-03-28 RX ADMIN — ACETAMINOPHEN 650 MG: 325 TABLET ORAL at 17:03

## 2023-03-28 RX ADMIN — POTASSIUM CHLORIDE 20 MEQ: 1500 TABLET, EXTENDED RELEASE ORAL at 02:34

## 2023-03-28 RX ADMIN — IOPAMIDOL 75 ML: 755 INJECTION, SOLUTION INTRAVENOUS at 04:09

## 2023-03-28 RX ADMIN — FUROSEMIDE 20 MG: 10 INJECTION, SOLUTION INTRAMUSCULAR; INTRAVENOUS at 02:38

## 2023-03-28 RX ADMIN — SODIUM CHLORIDE, PRESERVATIVE FREE 10 ML: 5 INJECTION INTRAVENOUS at 20:50

## 2023-03-28 RX ADMIN — DILTIAZEM HYDROCHLORIDE 10 MG: 5 INJECTION, SOLUTION INTRAVENOUS at 05:10

## 2023-03-28 RX ADMIN — METOPROLOL TARTRATE 25 MG: 25 TABLET, FILM COATED ORAL at 02:34

## 2023-03-28 RX ADMIN — MAGNESIUM SULFATE HEPTAHYDRATE 2000 MG: 40 INJECTION, SOLUTION INTRAVENOUS at 09:44

## 2023-03-28 RX ADMIN — DILTIAZEM HYDROCHLORIDE 120 MG: 120 CAPSULE, EXTENDED RELEASE ORAL at 18:16

## 2023-03-28 RX ADMIN — ADENOSINE 6 MG: 3 INJECTION INTRAVENOUS at 04:18

## 2023-03-28 RX ADMIN — PANTOPRAZOLE SODIUM 40 MG: 40 TABLET, DELAYED RELEASE ORAL at 09:44

## 2023-03-28 RX ADMIN — SPIRONOLACTONE 25 MG: 25 TABLET ORAL at 18:16

## 2023-03-28 RX ADMIN — ENOXAPARIN SODIUM 40 MG: 100 INJECTION SUBCUTANEOUS at 09:44

## 2023-03-28 RX ADMIN — SODIUM CHLORIDE, PRESERVATIVE FREE 10 ML: 5 INJECTION INTRAVENOUS at 09:45

## 2023-03-28 RX ADMIN — ATORVASTATIN CALCIUM 40 MG: 20 TABLET, FILM COATED ORAL at 20:49

## 2023-03-28 RX ADMIN — SODIUM CHLORIDE, PRESERVATIVE FREE 10 ML: 5 INJECTION INTRAVENOUS at 22:34

## 2023-03-28 RX ADMIN — CEFTRIAXONE 1000 MG: 1 INJECTION, POWDER, FOR SOLUTION INTRAMUSCULAR; INTRAVENOUS at 05:05

## 2023-03-28 RX ADMIN — SODIUM CHLORIDE, PRESERVATIVE FREE 10 ML: 5 INJECTION INTRAVENOUS at 22:20

## 2023-03-28 RX ADMIN — ADENOSINE 12 MG: 3 INJECTION INTRAVENOUS at 04:24

## 2023-03-28 RX ADMIN — POTASSIUM BICARBONATE 40 MEQ: 782 TABLET, EFFERVESCENT ORAL at 02:34

## 2023-03-28 RX ADMIN — FUROSEMIDE 20 MG: 10 INJECTION, SOLUTION INTRAMUSCULAR; INTRAVENOUS at 15:21

## 2023-03-28 RX ADMIN — MAGNESIUM SULFATE HEPTAHYDRATE 2000 MG: 40 INJECTION, SOLUTION INTRAVENOUS at 02:37

## 2023-03-28 ASSESSMENT — PAIN - FUNCTIONAL ASSESSMENT: PAIN_FUNCTIONAL_ASSESSMENT: NONE - DENIES PAIN

## 2023-03-28 NOTE — PROGRESS NOTES
Bedside and Verbal shift change report given to [facility] (oncoming nurse) by Amy Romero (offgoing nurse).  Report included the following information Nurse Handoff Report, Intake/Output, MAR, Recent Results, and Cardiac Rhythm SR .

## 2023-03-28 NOTE — ED NOTES
TRANSFER - OUT REPORT:    Verbal report given to [facility] RN on Stephanie Perkins  being transferred to [place] [address] for routine progression of patient care       Report consisted of patient's Situation, Background, Assessment and   Recommendations(SBAR). Information from the following report(s) Nurse Handoff Report, ED SBAR, MAR, Med Rec Status, Cardiac Rhythm NSR, and Neuro Assessment was reviewed with the receiving nurse. Rubicon Assessment: No data recorded  Lines:   Peripheral IV 03/28/23 Left Antecubital (Active)   Site Assessment Clean, dry & intact 03/28/23 0237   Line Status Blood return noted 03/28/23 4081        Opportunity for questions and clarification was provided.       Patient transported with:  Monitor and Registered Nurse           Yessenia Menendez RN  03/28/23 2254

## 2023-03-28 NOTE — ED NOTES
Cardiac monitor showing rhythm with HR greater than 130 bpm.  MD aware. Repeat EKG performed and given to Physician, pt denies any changes, will continue to monitor pt.         Shahnaz Clay RN  03/28/23 9258

## 2023-03-28 NOTE — PROGRESS NOTES
Patient admitted to room 348 with daughter at side. Patient Alert oriented x4 no s/s of any distress at this time. Awaiting on American speaking monitor  from supervisor, Sean/ on coming nurse, Sarah Ugarte RN will be made aware. Teley monitor attached and monitored. Patient oriented to room, call light at bedside, bed alarm initiated with understanding. Fall precaution initiated. Will continue to monitor.

## 2023-03-28 NOTE — ED TRIAGE NOTES
Pt arrived with c.o \"sob, tachycardia and bilat leg swelling\". Per family, pt HR has \"been shooting up lately in to the high 100s\". Pt HR is triage 88 bpm. Family also concerned about swelling in patients legs. Pt is [place] speaking only. Denies CP.

## 2023-03-28 NOTE — FLOWSHEET NOTE
03/28/23 1645 03/28/23 1700   Vital Signs   Temp (!) 100.9 °F (38.3 °C) 98.7 °F (37.1 °C)   Temp Source Oral Oral   Heart Rate 67 70   Heart Rate Source Monitor Monitor   Resp 18 18   BP (!) 109/55 (!) 117/45   MAP (Calculated) 73 69   BP Location Left upper arm Right upper arm   BP Method Automatic Automatic   Patient Position Semi fowlers Sitting   Level of Consciousness 0 0   MEWS Score 1 1   Given PO tylenol./ Encouraged TCDB. OOB to chair with meals. Instructed to use I/S.

## 2023-03-28 NOTE — CONSULTS
[facility]      Patient: Yuval Trammell MRN: [id number]  SSN: [id number]    YOB: 1955  Age: 69 y.o. Sex: female    Date of Consultation: 3/28/2023    Reason for Consultation: SVT and SOB    HPI:  I was asked by  to see this pleasant patient for SVT and shortness of breath and leg swelling. Yuval Trammell is a 53-year-old pleasant patient with a history of paroxysmal supraventricular tachycardia, diastolic heart failure, hypertension came to the hospital with symptoms of palpitation, leg swelling and some shortness of breath patient was found to be in SVT. Patient was initially treated with adenosine without any improvement then patient was given intravenous Cardizem 10 mg and patient converted back to normal rhythm. Patient have associated significant hypokalemia 3.1 and hypomagnesemia 1.2. Patient denied any chest pain, presyncope or syncope. Patient have mild lower extremity swelling which has improved. Underwent cardiac testing in [facility] outpatient cardiology clinic with normal EF and grade 2 diastolic dysfunction and negative stress test.             Past Medical History:   Diagnosis Date    Arrhythmia     \"my heart beats fast\" \" I am awaiting results from Cardiologist\"    Hypertension     Psychiatric disorder     anxiety     History reviewed. No pertinent surgical history.   Current Facility-Administered Medications   Medication Dose Route Frequency    [START ON 3/29/2023] cefTRIAXone (ROCEPHIN) 1,000 mg in sodium chloride 0.9 % 50 mL IVPB (mini-bag)  1,000 mg IntraVENous Q24H    furosemide (LASIX) injection 20 mg  20 mg IntraVENous BID    sodium chloride flush 0.9 % injection 5-40 mL  5-40 mL IntraVENous 2 times per day    sodium chloride flush 0.9 % injection 5-40 mL  5-40 mL IntraVENous PRN    0.9 % sodium chloride infusion   IntraVENous PRN    enoxaparin (LOVENOX) injection 40 mg  40 mg SubCUTAneous Daily    ondansetron (ZOFRAN-ODT) disintegrating tablet 4 balance. Thank you for allowing me to participate in the management of this pleasant patient. Please feel free to contact me if you have any questions or concerns.             Signed By: Shaji Perales MD     March 28, 2023

## 2023-03-28 NOTE — ED NOTES
10 mg IV Cardizem pushed via left AC, tolerated well by pt, pt found to be in NSR following push, rhythm confirmed via EKG, MD at bedside and aware. New order to discontinue Cardizem drip, medication discontinued at this time. Pt stable in no distress, call bell within reach, side rails up x 2, will continue to monitor.       Phil Dhillon RN  03/28/23 5415

## 2023-03-28 NOTE — PROGRESS NOTES
Pt just admitted by overnight hospitalist.     Principal Problem:    SVT (supraventricular tachycardia) (HCC)  Active Problems:    Hypomagnesemia    Symptomatic anemia    Mass of pelvis    HTN (hypertension)    Positive D dimer         1. SVT  Rate control with atenolol  Consider changing from amlodipine to diltiazem  Trend troponin  Electrolyte replaced     2. CHF recurrent with chest  pain   IV Lasix  Recheck electrolytes at noon  Cardiology consult  We will hold off on echo as she just had one vascular nontender with normal EF  Benefit from ischemic testing with stress test     3. UTI  Urine culture  IV Rocephin     4. Pelvic mass  Work-up by GYN oncology Dr. Kevin Ny  They are following with serial pelvic ultrasound every 6 months  If there is an increase in they will do surgery     5. Anemia  Iron studies  For chronic disease  Venofer. Check stool cards     6.   Elevated D-dimer  Check lower extremity DVT likely from pelvic mass        DVT/GI Prophylaxis: Lovenox Protonix

## 2023-03-28 NOTE — ED PROVIDER NOTES
lower extremity venous bilateral    (Results Pending)           Medical Decision Making       I reviewed the vital signs, available nursing notes, past medical history, past surgical history, family history and social history. Vital Signs-Reviewed the patient's vital signs. Patient Vitals for the past 12 hrs:   Temp Pulse Resp BP SpO2   03/28/23 0645 98.3 °F (36.8 °C) -- 20 (!) 113/49 --   03/28/23 0600 -- 66 27 -- 97 %   03/28/23 0545 97.6 °F (36.4 °C) 69 28 (!) 114/47 97 %   03/28/23 0530 -- 67 21 (!) 106/41 94 %   03/28/23 0515 -- (!) 39 21 (!) 104/46 96 %   03/28/23 0500 -- (!) 124 (!) 31 110/62 97 %   03/28/23 0445 -- (!) 127 (!) 34 (!) 109/56 96 %   03/28/23 0430 -- (!) 130 27 119/65 94 %   03/28/23 0415 -- (!) 127 (!) 31 (!) 122/56 96 %   03/28/23 0400 -- (!) 126 (!) 32 128/69 96 %   03/28/23 0345 -- -- -- 125/64 --   03/28/23 0330 -- (!) 130 30 112/66 95 %   03/28/23 0315 -- (!) 132 28 -- 97 %   03/28/23 0300 -- (!) 132 (!) 31 119/65 96 %   03/28/23 0245 -- 84 30 (!) 128/56 96 %   03/28/23 0230 -- (!) 134 28 120/71 98 %   03/28/23 0226 -- 87 24 (!) 132/55 98 %   03/28/23 0154 -- 87 27 -- 98 %   03/28/23 0142 -- 92 (!) 32 -- 98 %   03/28/23 0141 -- (!) 139 (!) 34 -- 99 %   03/28/23 0140 -- -- -- -- 98 %   03/28/23 0139 -- -- -- 139/74 98 %   03/28/23 0124 97.3 °F (36.3 °C) 88 16 (!) 134/58 100 %         EKG interpretation: (Preliminary)  Rhythm: sinus rhythm Rate (approx.): 89 Axis: normal; P wave: normal; QRS interval: normal ; ST/T wave: normal;  was interpreted by Kulwant Larsen MD,ED Provider. Provider Notes (Medical Decision Making): On presentation, the patient is well appearing, in no acute distress with reassuring vital signs. Based on my history and exam the differential diagnosis for this patient includes arrhythmia, electro abnormality, metabolic malady, sepsis, ACS, PE, thyroid dysfunction among others.     Initially on presentation patient was in a sinus rhythm however shortly acetaminophen (TYLENOL) tablet 650 mg (has no administration in time range)     Or   acetaminophen (TYLENOL) suppository 650 mg (has no administration in time range)   atorvastatin (LIPITOR) tablet 40 mg (has no administration in time range)   pantoprazole (PROTONIX) tablet 40 mg (has no administration in time range)   metoprolol tartrate (LOPRESSOR) tablet 25 mg (25 mg Oral Given 3/28/23 0234)   potassium bicarb-citric acid (EFFER-K) effervescent tablet 40 mEq (40 mEq Oral Given 3/28/23 0234)   potassium chloride (KLOR-CON M) extended release tablet 20 mEq (20 mEq Oral Given 3/28/23 0234)   magnesium sulfate 2000 mg in 50 mL IVPB premix (0 mg IntraVENous Stopped 3/28/23 0505)   furosemide (LASIX) injection 20 mg (20 mg IntraVENous Given 3/28/23 0238)   adenosine (ADENOCARD) injection 6 mg (6 mg IntraVENous Given 3/28/23 0418)   iopamidol (ISOVUE-370) 76 % injection 75 mL (75 mLs IntraVENous Given 3/28/23 0409)   adenosine (ADENOCARD) injection 12 mg (12 mg IntraVENous Given 3/28/23 0424)   cefTRIAXone (ROCEPHIN) 1,000 mg in sodium chloride 0.9 % 50 mL IVPB (mini-bag) (0 mg IntraVENous Stopped 3/28/23 0517)   dilTIAZem injection 10 mg (10 mg IntraVENous Given 3/28/23 0510)              CONSULTS: (Who and What was discussed)  IP CONSULT TO HOSPITALIST  IP CONSULT TO Motilo Kumar's  results have been reviewed with her. She has been counseled regarding her diagnosis. Patient is in agreement with plan for admission    Critical Care Note      NOTES   :  I have provided a total of 45 minutes of critical time not including time spent on separately documented procedures. The reason for providing this level of medical care for this critically ill patient was due to a critical illness that impaired one or more vital organ systems such that there was a high probability of imminent or life threatening deterioration in the patients condition.  This care involved high complexity decision making

## 2023-03-28 NOTE — ED NOTES
Assumed care of patient who is to ED with complaints of SOB and increased HR. Pt Rwandan speaking only and daughter at bedside to translate. EKG performed, Sinus Rhythm with PAC's noted, trace non-pitting edema to BLE, lungs clear and diminished bilaterally. Spo2 98% RA, Respiration 27 bpm even and slightly labored with exertion. 20 gauge PIV established in [address] [place] [address], blood sample obtained and sent to lab as ordered. Call bell within reach, side rails up x 2, will continue to monitor pt.         James Mulligan RN  03/28/23 [address] RN  03/28/23 0200

## 2023-03-28 NOTE — PLAN OF CARE
Latest Reference Range & Units 3/28/23 01:43   136 - 145 mmol/L 137   3.5 - 5.5 mmol/L 3.1 (L)   100 - 111 mmol/L 108   21 - 32 mmol/L 24   7.0 - 18 MG/DL 16   0.6 - 1.3 MG/DL 1.06   12 - 20   15   3.0 - 18 mmol/L 5   >60 ml/min/1.73m2 57 (L)   1.6 - 2.6 mg/dL 1.2 (L)   74 - 99 mg/dL 148 (H)   8.5 - 10.1 MG/DL 7.8 (L)   0.8 - 1.7   0.6 (L)   6.4 - 8.2 g/dL 7.0   0 - 900 PG/ML 2,965 (H)   (L): Data is abnormally low  (H): Data is abnormally high    IV mag infusing. Low K reported to MD.     Patient alert, oriented and in NAD. Patient able to ambulate to bathroom with minimal assist.     1113: off floor for vascular.       Latest Reference Range & Units 3/28/23 10:07   Sodium 136 - 145 mmol/L 136   Potassium 3.5 - 5.5 mmol/L 3.6   Chloride 100 - 111 mmol/L 105   CO2 21 - 32 mmol/L 25   BUN,BUNPL 7.0 - 18 MG/DL 17   Creatinine 0.6 - 1.3 MG/DL 1.05   Bun/Cre Ratio 12 - 20   16   Anion Gap 3.0 - 18 mmol/L 6   Est, Glom Filt Rate >60 ml/min/1.73m2 58 (L)   Magnesium 1.6 - 2.6 mg/dL 1.9   Glucose, Random 74 - 99 mg/dL 119 (H)   CALCIUM, SERUM, 366381 8.5 - 10.1 MG/DL 7.9 (L)   (L): Data is abnormally low  (H): Data is abnormally high    Problem: Discharge Planning  Goal: Discharge to home or other facility with appropriate resources  Outcome: Progressing     Problem: Cardiovascular - Adult  Goal: Maintains optimal cardiac output and hemodynamic stability  Outcome: Progressing     Problem: Hematologic - Adult  Goal: Maintains hematologic stability  Outcome: Progressing

## 2023-03-28 NOTE — PROGRESS NOTES
Physical Therapy Goals:  Initiated 3/28/2023 to be met within 7-10 days. Short Term Goals  Time Frame for Short Term Goals: 7 days  Short Term Goal 1: Patient will perform supine to/from sit independently  Short Term Goal 2: Patient will perform stand transfers with supervision  Short Term Goal 3: Patient will ambulate 150 feet without AD and supervision  Short Term Goal 4: Patient will negotiate 5 stairs with handrails and supervision to simulate home entry and bedroom negotiation  PHYSICAL THERAPY EVALUATION    Patient: Eris Santiago (81 y.o. female)  Date: 3/28/2023  Primary Diagnosis: Paroxysmal supraventricular tachycardia (HCC) [I47.1]  Hypokalemia [E87.6]  Hypomagnesemia [E83.42]  SVT (supraventricular tachycardia) (HCC) [I47.1]  Positive D dimer [R79.89]  Congestive heart failure, unspecified HF chronicity, unspecified heart failure type ([facility] [address] [I50.9]  Precautions: Fall Risk  PLOF: Independent ambulation without AD    ASSESSMENT :  Assessment: Pt presents with decreased lower extremity strength and endurance. Pt independent PLOF and has bedroom on second floor of home. Pt currently perform bed mobility with supervision, stand transfers with CGA. Pt ambulated 48 ft without AD, HHA and CGA for safety. Pt tolerated well with no complaints of dizziness. Will continue to follow pt for 1-2 session to progress gait and stair training and review HEP. DEFICITS/IMPAIRMENTS:    , Body Structures, Functions, Activity Limitations Requiring Skilled Therapeutic Intervention: Decreased functional mobility ; Decreased endurance;Decreased strength    Patient will benefit from skilled intervention to address the above impairments. Patient's rehabilitation potential is considered to be Good .   Factors which may influence rehabilitation potential include:   [x]         None noted  []         Mental ability/status  []         Medical condition  []         Home/family situation and support systems  []         Safety from another person does the patient currently need    (If the patient hasn't done an activity recently, how much help from another person do you think he/she would need if he/she tried?)   Total (Total A or Dep)   A Lot  (Mod to Max A)   A Little (Sup or Min A)   None (Mod I to I)   Turning from your back to your side while in a flat bed without using bedrails? [] 1 [] 2 [] 3 [x] 4   2. Moving from lying on your back to sitting on the side of a flat bed without using bedrails? [] 1 [] 2 [] 3 [x] 4   3. Moving to and from a bed to a chair (including a wheelchair)? [] 1 [] 2 [x] 3 [] 4   4. Standing up from a chair using your arms (e.g., wheelchair, or bedside chair)? [] 1 [] 2 [x] 3 [] 4   5. Walking in hospital room? [] 1 [] 2 [x] 3 [] 4   6. Climbing 3-5 steps with a railing?+   [] 1 [] 2 [] 3 [] 4   +If stair climbing cannot be assessed, skip item #6. Sum responses from items 1-5.

## 2023-03-28 NOTE — ED NOTES
Pt continues to have HR greater than 130, EKG reading SVT. New order to administer IV Adenosine. Defib pads placed on pt, crash cart at bedside. 6 mg of IV Adenosine pushed via left PIV with no conversion noted. MD, charge nurse, and 2 RN's at bedside to assist.     New order for 12 mg IV Adenosine, medication administered per [facility] with no conversion noted. MD remains at bedside and is aware. New order to start IV Cardizem drip including 10 mg bolus. Will administer medication when available from pharmacy.       Dolye Rehman, HEATH  03/28/23 2341

## 2023-03-28 NOTE — PROGRESS NOTES
Spoke to supervisor, Nora Rodas in reference to [address] speaking monitor for patient who is being admittd to room 348. He stated he will bring up to floor.

## 2023-03-28 NOTE — H&P
AM    GLUCOSE 148 03/28/2023 01:43 AM    CALCIUM 7.8 03/28/2023 01:43 AM    PROT 7.0 03/28/2023 01:43 AM    LABALBU 2.6 03/28/2023 01:43 AM    BILITOT 0.4 03/28/2023 01:43 AM    AST 33 03/28/2023 01:43 AM    ALT 24 03/28/2023 01:43 AM      Last 3 CMP:   Recent Labs     03/28/23 0143      K 3.1*      CO2 24   BUN 16   CREATININE 1.06   GLUCOSE 148*   CALCIUM 7.8*   PROT 7.0   LABALBU 2.6*   ALKPHOS 154*   AST 33   ALT 24      Glucose:   Lab Results   Component Value Date/Time    GLUCOSE 148 03/28/2023 01:43 AM      Last 3 Glucose:   Recent Labs     03/28/23 0143   GLUCOSE 148*      POC Glucose:   Lab Results   Component Value Date/Time    POCGLU 118 03/09/2021 06:18 AM      Last 3 POC Glucose: No results for input(s): POCGLU in the last 72 hours. Last 3 CK, CKMB, Troponin: No results for input(s): CKTOTAL, CKMB, TROPONINI in the last 72 hours.    CBC:   Lab Results   Component Value Date/Time    WBC 4.9 03/28/2023 01:43 AM    RBC 3.27 03/28/2023 01:43 AM    HGB 8.2 03/28/2023 01:43 AM    HCT 26.1 03/28/2023 01:43 AM    MCV 79.8 03/28/2023 01:43 AM    MCH 25.1 03/28/2023 01:43 AM    MCHC 31.4 03/28/2023 01:43 AM    RDW 15.9 03/28/2023 01:43 AM     03/28/2023 01:43 AM    MPV 9.1 03/28/2023 01:43 AM      Last 3 CBC:   Recent Labs     03/28/23 0143   WBC 4.9   RBC 3.27*   HGB 8.2*   HCT 26.1*   MCV 79.8   MCH 25.1   MCHC 31.4   RDW 15.9*      MPV 9.1*      WBC:   Lab Results   Component Value Date/Time    WBC 4.9 03/28/2023 01:43 AM      Last 3 WBC:   Recent Labs     03/28/23 0143   WBC 4.9      Platelets:   Lab Results   Component Value Date/Time     03/28/2023 01:43 AM      Last 3 Platelets:   Recent Labs     03/28/23 0143         Hemoglobin/Hematocrit:   Lab Results   Component Value Date/Time    HGB 8.2 03/28/2023 01:43 AM    HCT 26.1 03/28/2023 01:43 AM      Last 3 Hemoglobin/Hematocrit:   Recent Labs     03/28/23 0143   HGB 8.2*   HCT 26.1*      Hepatic Function Panel: administration of IV contrast thin helical axial images were obtained through the chest. 3D image postprocessing was performed. CT dose reduction was achieved through use of a standardized protocol tailored for this examination and automatic exposure control for dose modulation. FINDINGS: THYROID: Unremarkable. MEDIASTINUM/CIRA: No mass or lymphadenopathy. HEART/PERICARDIUM: Unremarkable. Coronary artery calcification:  2 present. AORTA:  No aneurysm. PULMONARY ARTERIES:No pulmonary embolism. LUNGS/PLEURA: Mild pulmonary edema and trace pleural effusions. INCIDENTALLY IMAGED UPPER ABDOMEN: No significant abnormality. BONES: No destructive bone lesion. ADDITIONAL COMMENTS:  N/A     No acute pulmonary embolus. Mild pulmonary edema and trace pleural effusions. XR CHEST PORTABLE    Result Date: 3/28/2023  INDICATION: sob  EXAM:  AP CHEST RADIOGRAPH COMPARISON: January 2, 2023 FINDINGS: AP portable view of the chest demonstrates a normal cardiomediastinal silhouette. Mild pulmonary edema. No pleural effusion or pneumothorax. The osseous structures are unremarkable. Mild pulmonary edema. Imaging results impression onlyCTA CHEST W WO CONTRAST    Result Date: 3/28/2023  No acute pulmonary embolus. Mild pulmonary edema and trace pleural effusions. XR CHEST PORTABLE    Result Date: 3/28/2023  Mild pulmonary edema. CTA CHEST W WO CONTRAST   Final Result   No acute pulmonary embolus. Mild pulmonary edema and trace pleural effusions. XR CHEST PORTABLE   Final Result   Mild pulmonary edema.       Vascular duplex lower extremity venous bilateral    (Results Pending)       LAST EKG  Results for orders placed or performed during the hospital encounter of 03/28/23   EKG 12 Lead   Result Value Ref Range    Ventricular Rate 89 BPM    Atrial Rate 89 BPM    P-R Interval 130 ms    QRS Duration 88 ms    Q-T Interval 346 ms    QTc Calculation (Bazett) 420 ms    P Axis 64 degrees    R Axis 80 degrees    T Axis

## 2023-03-28 NOTE — ED NOTES
Pt to restroom via w/c. Urine sample obtained via clean catch, specimen sent to lab as ordered. ROJAS noted with transfer, no other changes, will continue to monitor pt.       Dedrick Dale RN  03/28/23 7188

## 2023-03-29 LAB
ALBUMIN SERPL-MCNC: 2.3 G/DL (ref 3.4–5)
ALBUMIN/GLOB SERPL: 0.6 (ref 0.8–1.7)
ALP SERPL-CCNC: 129 U/L (ref 45–117)
ALT SERPL-CCNC: 18 U/L (ref 13–56)
ANION GAP SERPL CALC-SCNC: 7 MMOL/L (ref 3–18)
AST SERPL-CCNC: 27 U/L (ref 10–38)
BILIRUB SERPL-MCNC: 0.3 MG/DL (ref 0.2–1)
BUN SERPL-MCNC: 22 MG/DL (ref 7–18)
BUN/CREAT SERPL: 19 (ref 12–20)
CALCIUM SERPL-MCNC: 7.9 MG/DL (ref 8.5–10.1)
CHLORIDE SERPL-SCNC: 107 MMOL/L (ref 100–111)
CO2 SERPL-SCNC: 25 MMOL/L (ref 21–32)
CREAT SERPL-MCNC: 1.16 MG/DL (ref 0.6–1.3)
EKG ATRIAL RATE: 68 BPM
EKG ATRIAL RATE: 71 BPM
EKG ATRIAL RATE: 89 BPM
EKG DIAGNOSIS: NORMAL
EKG P AXIS: 64 DEGREES
EKG P AXIS: 73 DEGREES
EKG P AXIS: 74 DEGREES
EKG P-R INTERVAL: 114 MS
EKG P-R INTERVAL: 130 MS
EKG P-R INTERVAL: 144 MS
EKG Q-T INTERVAL: 310 MS
EKG Q-T INTERVAL: 326 MS
EKG Q-T INTERVAL: 332 MS
EKG Q-T INTERVAL: 346 MS
EKG Q-T INTERVAL: 368 MS
EKG Q-T INTERVAL: 408 MS
EKG QRS DURATION: 80 MS
EKG QRS DURATION: 82 MS
EKG QRS DURATION: 86 MS
EKG QRS DURATION: 88 MS
EKG QRS DURATION: 90 MS
EKG QRS DURATION: 96 MS
EKG QTC CALCULATION (BAZETT): 414 MS
EKG QTC CALCULATION (BAZETT): 420 MS
EKG QTC CALCULATION (BAZETT): 433 MS
EKG QTC CALCULATION (BAZETT): 457 MS
EKG QTC CALCULATION (BAZETT): 496 MS
EKG QTC CALCULATION (BAZETT): 498 MS
EKG R AXIS: 73 DEGREES
EKG R AXIS: 74 DEGREES
EKG R AXIS: 75 DEGREES
EKG R AXIS: 76 DEGREES
EKG R AXIS: 78 DEGREES
EKG R AXIS: 80 DEGREES
EKG T AXIS: -35 DEGREES
EKG T AXIS: 12 DEGREES
EKG T AXIS: 2 DEGREES
EKG T AXIS: 33 DEGREES
EKG T AXIS: 46 DEGREES
EKG T AXIS: 49 DEGREES
EKG VENTRICULAR RATE: 131 BPM
EKG VENTRICULAR RATE: 135 BPM
EKG VENTRICULAR RATE: 139 BPM
EKG VENTRICULAR RATE: 68 BPM
EKG VENTRICULAR RATE: 76 BPM
EKG VENTRICULAR RATE: 89 BPM
ERYTHROCYTE [DISTWIDTH] IN BLOOD BY AUTOMATED COUNT: 16.2 % (ref 11.6–14.5)
GLOBULIN SER CALC-MCNC: 4 G/DL (ref 2–4)
GLUCOSE SERPL-MCNC: 103 MG/DL (ref 74–99)
HCT VFR BLD AUTO: 23.1 % (ref 35–45)
HGB BLD-MCNC: 7.2 G/DL (ref 12–16)
MAGNESIUM SERPL-MCNC: 2.4 MG/DL (ref 1.6–2.6)
MCH RBC QN AUTO: 25.6 PG (ref 24–34)
MCHC RBC AUTO-ENTMCNC: 31.2 G/DL (ref 31–37)
MCV RBC AUTO: 82.2 FL (ref 78–100)
NRBC # BLD: 0 K/UL (ref 0–0.01)
NRBC BLD-RTO: 0 PER 100 WBC
PLATELET # BLD AUTO: 256 K/UL (ref 135–420)
PMV BLD AUTO: 9.5 FL (ref 9.2–11.8)
POTASSIUM SERPL-SCNC: 3.7 MMOL/L (ref 3.5–5.5)
PROT SERPL-MCNC: 6.3 G/DL (ref 6.4–8.2)
RBC # BLD AUTO: 2.81 M/UL (ref 4.2–5.3)
SODIUM SERPL-SCNC: 139 MMOL/L (ref 136–145)
TROPONIN I SERPL HS-MCNC: 12 NG/L (ref 0–54)
WBC # BLD AUTO: 2.8 K/UL (ref 4.6–13.2)

## 2023-03-29 PROCEDURE — 6360000002 HC RX W HCPCS: Performed by: INTERNAL MEDICINE

## 2023-03-29 PROCEDURE — 80053 COMPREHEN METABOLIC PANEL: CPT

## 2023-03-29 PROCEDURE — 85027 COMPLETE CBC AUTOMATED: CPT

## 2023-03-29 PROCEDURE — 84484 ASSAY OF TROPONIN QUANT: CPT

## 2023-03-29 PROCEDURE — 1100000003 HC PRIVATE W/ TELEMETRY

## 2023-03-29 PROCEDURE — 36415 COLL VENOUS BLD VENIPUNCTURE: CPT

## 2023-03-29 PROCEDURE — 6370000000 HC RX 637 (ALT 250 FOR IP): Performed by: INTERNAL MEDICINE

## 2023-03-29 PROCEDURE — 83735 ASSAY OF MAGNESIUM: CPT

## 2023-03-29 PROCEDURE — 6360000002 HC RX W HCPCS: Performed by: FAMILY MEDICINE

## 2023-03-29 PROCEDURE — 2580000003 HC RX 258: Performed by: INTERNAL MEDICINE

## 2023-03-29 RX ORDER — METOPROLOL SUCCINATE 25 MG/1
25 TABLET, EXTENDED RELEASE ORAL DAILY
Status: DISCONTINUED | OUTPATIENT
Start: 2023-03-29 | End: 2023-03-31 | Stop reason: HOSPADM

## 2023-03-29 RX ORDER — DILTIAZEM HYDROCHLORIDE 180 MG/1
180 CAPSULE, COATED, EXTENDED RELEASE ORAL DAILY
Status: DISCONTINUED | OUTPATIENT
Start: 2023-03-30 | End: 2023-03-31 | Stop reason: HOSPADM

## 2023-03-29 RX ADMIN — PANTOPRAZOLE SODIUM 40 MG: 40 TABLET, DELAYED RELEASE ORAL at 08:38

## 2023-03-29 RX ADMIN — SPIRONOLACTONE 25 MG: 25 TABLET ORAL at 17:42

## 2023-03-29 RX ADMIN — METOPROLOL SUCCINATE 25 MG: 25 TABLET, EXTENDED RELEASE ORAL at 20:36

## 2023-03-29 RX ADMIN — CEFTRIAXONE 1000 MG: 1 INJECTION, POWDER, FOR SOLUTION INTRAMUSCULAR; INTRAVENOUS at 04:48

## 2023-03-29 RX ADMIN — SODIUM CHLORIDE, PRESERVATIVE FREE 10 ML: 5 INJECTION INTRAVENOUS at 08:38

## 2023-03-29 RX ADMIN — FUROSEMIDE 20 MG: 10 INJECTION, SOLUTION INTRAMUSCULAR; INTRAVENOUS at 08:38

## 2023-03-29 RX ADMIN — FUROSEMIDE 20 MG: 10 INJECTION, SOLUTION INTRAMUSCULAR; INTRAVENOUS at 17:42

## 2023-03-29 RX ADMIN — IRON SUCROSE 200 MG: 20 INJECTION, SOLUTION INTRAVENOUS at 13:43

## 2023-03-29 RX ADMIN — SODIUM CHLORIDE, PRESERVATIVE FREE 10 ML: 5 INJECTION INTRAVENOUS at 20:45

## 2023-03-29 RX ADMIN — ATORVASTATIN CALCIUM 40 MG: 20 TABLET, FILM COATED ORAL at 20:35

## 2023-03-29 RX ADMIN — DILTIAZEM HYDROCHLORIDE 120 MG: 120 CAPSULE, EXTENDED RELEASE ORAL at 17:43

## 2023-03-29 RX ADMIN — ENOXAPARIN SODIUM 40 MG: 100 INJECTION SUBCUTANEOUS at 08:38

## 2023-03-29 NOTE — CARE COORDINATION
D/c plan: Home w/ family assistance. CM met w/ pt at bedside. Pt put her dtr on speaker phone. Dtr confirmed the information on the pt's face sheet. Pt lives at home w/ her dtr. Pt is independent in all ADLs at baseline w/out an assistive device. Pt doesn't drive. Her dtr drives her. Case Management Assessment  Initial Evaluation    Date/Time of Evaluation: 3/29/2023 4:02 PM  Assessment Completed by: Brenna Astorga RN    If patient is discharged prior to next notation, then this note serves as note for discharge by case management. Patient Name: Diane Vallecillo                   YOB: 1955  Diagnosis: Paroxysmal supraventricular tachycardia ([facility] [address] [I47.1]  Hypokalemia [E87.6]  Hypomagnesemia [E83.42]  SVT (supraventricular tachycardia) ([address] [I47.1]  Positive D dimer [R79.89]  Congestive heart failure, unspecified HF chronicity, unspecified heart failure type ([address] [I50.9]                   Date / Time: 3/28/2023  1:25 AM    Patient Admission Status: Inpatient   Readmission Risk (Low < 19, Mod (19-27), High > 27): Readmission Risk Score: 13.7    Current PCP: Gracy Quintanilla MD  PCP verified by CM? Yes    Chart Reviewed: Yes      History Provided by: Child/Family  Patient Orientation: Alert and Oriented    Patient Cognition: Alert    Hospitalization in the last 30 days (Readmission):  No    If yes, Readmission Assessment in  Navigator will be completed. Advance Directives:      Code Status: Full Code   Patient's Primary Decision Maker is: Legal Next of Kin    Primary Decision Maker:  Tracee Cleaning Child - [phone]    Discharge Planning:    Patient lives with: Children Type of Home: House  Primary Care Giver: Self  Patient Support Systems include: Family Members   Current Financial resources: Medicare, Medicaid  Current community resources: None  Current services prior to admission: None            Current DME:              Type of Home Care services:  None    ADLS  Prior functional level: Independent in ADLs/IADLs  Current functional level: Independent in ADLs/IADLs    PT AM-PAC:   /24  OT AM-PAC:   /24    Family can provide assistance at DC: Yes  Would you like Case Management to discuss the discharge plan with any other family members/significant others, and if so, who? Yes  Plans to Return to Present Housing: Yes  Other Identified Issues/Barriers to RETURNING to current housing: No  Potential Assistance needed at discharge: N/A            Potential DME:    Patient expects to discharge to: [address] [place] [address] for transportation at discharge: Family    Financial    Payor: [address] [place] [address] / Plan: MEDICARE PART A AND B / Product Type: *No Product type* /     Does insurance require precert for SNF: Yes    Potential assistance Purchasing Medications: No  Meds-to-Beds request: Yes      [facility] [address] [place], [place] - [address] [address] [phone] - [phone]  [address] [facility] [address]  Phone: [phone] Fax: [phone]    [address] [facility] [address] - [facility]  [address]  [address]  Phone: [phone] Fax: [phone]      Notes:    Factors facilitating achievement of predicted outcomes: Family support    The Plan for Transition of Care is related to the following treatment goals of Paroxysmal supraventricular tachycardia (HCC) [I47.1]  Hypokalemia [E87.6]  Hypomagnesemia [E83.42]  SVT (supraventricular tachycardia) (HCC) [I47.1]  Positive D dimer [R79.89]  Congestive heart failure, unspecified HF chronicity, unspecified heart failure type ([facility] [address] [C65.3]    IF APPLICABLE: The Patient and/or patient representative Kasandra Tomas and her family were provided with a choice of provider and agrees with the discharge plan.  Freedom of choice list with basic dialogue that supports the patient's individualized plan of care/goals and shares the quality data associated with

## 2023-03-29 NOTE — PROGRESS NOTES
Hospitalist Progress Note    Patient: Kasia Rodrigues MRN: [id number]  CSN: [id number]    YOB: 1955  Age: 69 y.o. Sex: female    DOA: 3/28/2023 LOS:  LOS: 1 day          Chief Complaint:    SVT      Assessment/Plan   Principal Problem:    SVT (supraventricular tachycardia) (HCC)  Active Problems:    Hypomagnesemia    Symptomatic anemia    Mass of pelvis    HTN (hypertension)    Positive D dimer      1. SVT  Cardiology following, appreciate Dr. Shira Toussaint expertise  Converted with IV Cardizem drip to normal rhythm  Patient started on Cardizem 120 mg daily and metoprolol succinate  Amlodipine and atenolol both DC'd by cardiology  Aldactone 25 mg daily started yesterday by cardiology as well  Hypokalemia has been corrected  Magnesium is 2.4  Troponin trend remain normal     2. CHF recurrent with chest  pain   IV Lasix  Cardiology following     3. UTI  Urine culture  IV Rocephin     4. Pelvic mass  Work-up by GYN oncology Dr. Cedric Matos  They are following with serial pelvic ultrasound every 6 months  Surgery planned if mass increases      5. Anemia  Hgb 7.2 this am   Trend H/H and if Hgb <7, will transfuse   Iron studies show low iron level   Order iron infusion   Venofer. Check stool cards     6. Elevated D-dimer  Check lower extremity DVT likely from pelvic mass -  No evidence of bilateral lower extremity deep venous thrombosis. Daughter at [facility]    Diagnosis Date Noted    Positive D dimer [R79.89] 03/28/2023    SVT (supraventricular tachycardia) ([facility] [address] [I47.1] 02/03/2023    Mass of pelvis [R19.00] 01/31/2023    HTN (hypertension) [I10] 01/31/2023    Symptomatic anemia [D64.9] 01/30/2023    Hypomagnesemia [E83.42] 09/18/2018       Disposition : TBD      Subjective:    Slept well. No complaints.    Daughter worried that pt not getting iron supplementation in hospital     Review of systems:    Constitutional: denies fevers, chills, myalgias  Respiratory: denies SOB, Results   Component Value Date/Time    TSH 1.29 02/02/2023 10:35 PM          Procedures/imaging: see electronic medical records for all procedures/Xrays and details which were not copied into this note but were reviewed prior to creation of Plan      Randy Mata MD

## 2023-03-29 NOTE — PLAN OF CARE
Problem: Discharge Planning  Goal: Discharge to home or other facility with appropriate resources  Recent Flowsheet Documentation  Taken 3/28/2023 2020 by Freeman Hernandez RN  Discharge to home or other facility with appropriate resources:   Identify barriers to discharge with patient and caregiver   Arrange for needed discharge resources and transportation as appropriate   Identify discharge learning needs (meds, wound care, etc)  3/28/2023 1108 by Norman Sanchez RN  Outcome: Progressing  Flowsheets (Taken 3/28/2023 0800)  Discharge to home or other facility with appropriate resources:   Identify barriers to discharge with patient and caregiver   Arrange for needed discharge resources and transportation as appropriate   Identify discharge learning needs (meds, wound care, etc)     Problem: Cardiovascular - Adult  Goal: Maintains optimal cardiac output and hemodynamic stability  Recent Flowsheet Documentation  Taken 3/28/2023 2020 by Freeman Hernandez RN  Maintains optimal cardiac output and hemodynamic stability:   Monitor blood pressure and heart rate   Monitor urine output and notify Licensed Independent Practitioner for values outside of normal range   Assess for signs of decreased cardiac output  3/28/2023 1108 by Norman Sanchez RN  Outcome: Progressing     Problem: Hematologic - Adult  Goal: Maintains hematologic stability  Recent Flowsheet Documentation  Taken 3/28/2023 2020 by Freeman Hernandez RN  Maintains hematologic stability:   Assess for signs and symptoms of bleeding or hemorrhage   Monitor labs for bleeding or clotting disorders  3/28/2023 1108 by Norman Sanchez RN  Outcome: Progressing  Flowsheets (Taken 3/28/2023 0800)  Maintains hematologic stability:   Assess for signs and symptoms of bleeding or hemorrhage   Monitor labs for bleeding or clotting disorders   Administer blood products/factors as ordered

## 2023-03-29 NOTE — PROGRESS NOTES
Cardiology Progress Note        Patient: Danielle Paulino        Sex: female          DOA: 3/28/2023  YOB: 1955      Age:  69 y.o.        LOS:  LOS: 1 day   Assessment/Plan     Principal Problem:    SVT (supraventricular tachycardia) (HCC)  Active Problems:    Hypomagnesemia    Symptomatic anemia    Mass of pelvis    HTN (hypertension)    Positive D dimer  Resolved Problems:    * No resolved hospital problems. *      Plan:  Maintaining sinus rhythm    SVT increase Cardizem from 120 to 180 mg daily  Continue with metoprolol succinate 25 mg daily  Continue with spironolactone 25 mg daily  Discussed with patient and daughter in the room                    Subjective:    cc:  Palpitation  Anemia        REVIEW OF SYSTEMS:     General: No fevers or chills. Cardiovascular: No chest pain or pressure. No palpitations. No ankle swelling  Pulmonary: No SOB, orthopnea, PND  Gastrointestinal: No nausea, vomiting or diarrhea      Objective:      Visit Vitals  /66   Pulse 83   Temp 98.4 °F (36.9 °C) (Oral)   Resp 18   Ht 4' 10\" (1.473 m)   Wt 124 lb 9 oz (56.5 kg)   SpO2 100%   BMI 26.03 kg/m²     Body mass index is 26.03 kg/m². Physical Exam:  General Appearance: Comfortable, not using accessory muscles of respiration. NECK: No JVD, no thyroidomeglay. LUNGS: Clear bilaterally. HEART: S1+S2 audible,    ABD: Non-tender, BS Audible    EXT: No edema, and no cysnosis. VASCULAR EXAM: Pulses are intact. PSYCHIATRIC EXAM: Mood is appropriate.     Medication:  Current Facility-Administered Medications   Medication Dose Route Frequency    iron sucrose (VENOFER) injection 200 mg  200 mg IntraVENous Q24H    [START ON 3/30/2023] dilTIAZem (CARDIZEM CD) extended release capsule 180 mg  180 mg Oral Daily    metoprolol succinate (TOPROL XL) extended release tablet 25 mg  25 mg Oral Daily    cefTRIAXone (ROCEPHIN) 1,000 mg in sodium chloride 0.9 % 50 mL IVPB (mini-bag)  1,000 mg IntraVENous Q24H    furosemide

## 2023-03-29 NOTE — PLAN OF CARE
Problem: Discharge Planning  Goal: Discharge to home or other facility with appropriate resources  Outcome: Progressing     Problem: Cardiovascular - Adult  Goal: Maintains optimal cardiac output and hemodynamic stability  Outcome: Progressing     Problem: Hematologic - Adult  Goal: Maintains hematologic stability  Outcome: Progressing

## 2023-03-30 LAB
ALBUMIN SERPL-MCNC: 2.3 G/DL (ref 3.4–5)
ALBUMIN/GLOB SERPL: 0.6 (ref 0.8–1.7)
ALP SERPL-CCNC: 130 U/L (ref 45–117)
ALT SERPL-CCNC: 18 U/L (ref 13–56)
ANION GAP SERPL CALC-SCNC: 6 MMOL/L (ref 3–18)
AST SERPL-CCNC: 23 U/L (ref 10–38)
BACTERIA SPEC CULT: ABNORMAL
BILIRUB SERPL-MCNC: 0.2 MG/DL (ref 0.2–1)
BUN SERPL-MCNC: 21 MG/DL (ref 7–18)
BUN/CREAT SERPL: 17 (ref 12–20)
CALCIUM SERPL-MCNC: 7.8 MG/DL (ref 8.5–10.1)
CC UR VC: ABNORMAL
CHLORIDE SERPL-SCNC: 102 MMOL/L (ref 100–111)
CO2 SERPL-SCNC: 28 MMOL/L (ref 21–32)
CREAT SERPL-MCNC: 1.21 MG/DL (ref 0.6–1.3)
ERYTHROCYTE [DISTWIDTH] IN BLOOD BY AUTOMATED COUNT: 16.1 % (ref 11.6–14.5)
GLOBULIN SER CALC-MCNC: 4.1 G/DL (ref 2–4)
GLUCOSE SERPL-MCNC: 93 MG/DL (ref 74–99)
HCT VFR BLD AUTO: 22.4 % (ref 35–45)
HGB BLD-MCNC: 7.2 G/DL (ref 12–16)
MAGNESIUM SERPL-MCNC: 1.9 MG/DL (ref 1.6–2.6)
MCH RBC QN AUTO: 26.1 PG (ref 24–34)
MCHC RBC AUTO-ENTMCNC: 32.1 G/DL (ref 31–37)
MCV RBC AUTO: 81.2 FL (ref 78–100)
NRBC # BLD: 0 K/UL (ref 0–0.01)
NRBC BLD-RTO: 0 PER 100 WBC
PLATELET # BLD AUTO: 284 K/UL (ref 135–420)
PMV BLD AUTO: 9.6 FL (ref 9.2–11.8)
POTASSIUM SERPL-SCNC: 3.4 MMOL/L (ref 3.5–5.5)
PROT SERPL-MCNC: 6.4 G/DL (ref 6.4–8.2)
RBC # BLD AUTO: 2.76 M/UL (ref 4.2–5.3)
SERVICE CMNT-IMP: ABNORMAL
SODIUM SERPL-SCNC: 136 MMOL/L (ref 136–145)
WBC # BLD AUTO: 2.7 K/UL (ref 4.6–13.2)

## 2023-03-30 PROCEDURE — 97116 GAIT TRAINING THERAPY: CPT

## 2023-03-30 PROCEDURE — 2580000003 HC RX 258: Performed by: INTERNAL MEDICINE

## 2023-03-30 PROCEDURE — 36415 COLL VENOUS BLD VENIPUNCTURE: CPT

## 2023-03-30 PROCEDURE — 80053 COMPREHEN METABOLIC PANEL: CPT

## 2023-03-30 PROCEDURE — 1100000003 HC PRIVATE W/ TELEMETRY

## 2023-03-30 PROCEDURE — 85027 COMPLETE CBC AUTOMATED: CPT

## 2023-03-30 PROCEDURE — 6360000002 HC RX W HCPCS: Performed by: FAMILY MEDICINE

## 2023-03-30 PROCEDURE — 6370000000 HC RX 637 (ALT 250 FOR IP): Performed by: INTERNAL MEDICINE

## 2023-03-30 PROCEDURE — 83735 ASSAY OF MAGNESIUM: CPT

## 2023-03-30 PROCEDURE — 6360000002 HC RX W HCPCS: Performed by: INTERNAL MEDICINE

## 2023-03-30 PROCEDURE — 6370000000 HC RX 637 (ALT 250 FOR IP): Performed by: HOSPITALIST

## 2023-03-30 RX ORDER — MECOBALAMIN 5000 MCG
5 TABLET,DISINTEGRATING ORAL NIGHTLY PRN
Status: DISCONTINUED | OUTPATIENT
Start: 2023-03-30 | End: 2023-03-31 | Stop reason: HOSPADM

## 2023-03-30 RX ORDER — FUROSEMIDE 40 MG/1
40 TABLET ORAL DAILY
Status: DISCONTINUED | OUTPATIENT
Start: 2023-03-31 | End: 2023-03-31 | Stop reason: HOSPADM

## 2023-03-30 RX ORDER — POTASSIUM CHLORIDE 20 MEQ/1
40 TABLET, EXTENDED RELEASE ORAL ONCE
Status: COMPLETED | OUTPATIENT
Start: 2023-03-30 | End: 2023-03-30

## 2023-03-30 RX ADMIN — DILTIAZEM HYDROCHLORIDE 180 MG: 180 CAPSULE, EXTENDED RELEASE ORAL at 18:10

## 2023-03-30 RX ADMIN — ENOXAPARIN SODIUM 40 MG: 100 INJECTION SUBCUTANEOUS at 08:48

## 2023-03-30 RX ADMIN — CEFTRIAXONE 1000 MG: 1 INJECTION, POWDER, FOR SOLUTION INTRAMUSCULAR; INTRAVENOUS at 04:59

## 2023-03-30 RX ADMIN — FUROSEMIDE 20 MG: 10 INJECTION, SOLUTION INTRAMUSCULAR; INTRAVENOUS at 08:48

## 2023-03-30 RX ADMIN — POTASSIUM CHLORIDE 40 MEQ: 1500 TABLET, EXTENDED RELEASE ORAL at 08:47

## 2023-03-30 RX ADMIN — SPIRONOLACTONE 25 MG: 25 TABLET ORAL at 16:57

## 2023-03-30 RX ADMIN — METOPROLOL SUCCINATE 25 MG: 25 TABLET, EXTENDED RELEASE ORAL at 22:32

## 2023-03-30 RX ADMIN — PANTOPRAZOLE SODIUM 40 MG: 40 TABLET, DELAYED RELEASE ORAL at 08:48

## 2023-03-30 RX ADMIN — IRON SUCROSE 200 MG: 20 INJECTION, SOLUTION INTRAVENOUS at 12:09

## 2023-03-30 RX ADMIN — SODIUM CHLORIDE, PRESERVATIVE FREE 10 ML: 5 INJECTION INTRAVENOUS at 08:48

## 2023-03-30 RX ADMIN — ATORVASTATIN CALCIUM 40 MG: 20 TABLET, FILM COATED ORAL at 22:33

## 2023-03-30 RX ADMIN — SODIUM CHLORIDE, PRESERVATIVE FREE 10 ML: 5 INJECTION INTRAVENOUS at 22:33

## 2023-03-30 NOTE — PLAN OF CARE
Problem: Discharge Planning  Goal: Discharge to home or other facility with appropriate resources  Recent Flowsheet Documentation  Taken 3/29/2023 1933 by Jose Maria Gallegos RN  Discharge to home or other facility with appropriate resources:   Identify discharge learning needs (meds, wound care, etc)   Arrange for needed discharge resources and transportation as appropriate   Identify barriers to discharge with patient and caregiver     Problem: Cardiovascular - Adult  Goal: Maintains optimal cardiac output and hemodynamic stability  Recent Flowsheet Documentation  Taken 3/29/2023 1933 by Jose Maria Gallegos RN  Maintains optimal cardiac output and hemodynamic stability:   Monitor blood pressure and heart rate   Monitor urine output and notify Licensed Independent Practitioner for values outside of normal range   Assess for signs of decreased cardiac output     Problem: Safety - Adult  Goal: Free from fall injury  Recent Flowsheet Documentation  Taken 3/29/2023 1933 by Jose Maria Gallegos RN  Free From Fall Injury: Instruct family/caregiver on patient safety

## 2023-03-30 NOTE — PROGRESS NOTES
Physician Progress Note      PATIENT:               Wilian Cordoba  CSN #:                  [id number]  :                       1955  ADMIT DATE:       3/28/2023 1:25 AM  [address] [place] [place] DATE:  RESPONDING  PROVIDER #:        Oleksandr Graves MD          QUERY TEXT:    Pt admitted with SVT. H&P notes \"CHF recurrent with chest  pain IV Lasix\". If   possible, please document in progress notes and discharge summary further   specificity regarding the type and acuity of CHF:    The medical record reflects the following:  Risk Factors: Per H&P \"presents to the emergency room with complaints of   palpitations shortness of breath and increased swelling in her legs. Clinical Indicators: H&P:  \"CHF recurrent with chest pain\" IV Lasix     Cardiology consult:  Chronic diastolic heart failure with grade 2   diastolic dysfunction  NT Pro-BNP 2,965  23  ECHO:  Left Ventricle: Normal left ventricular systolic function   with a visually estimated EF of 55 - 60%  Treatment: Lasix 20 mg IV 2 times daily; Cardiology consult; daily weights;   I&Os; telemetry    Thank you,  Luan De Los Santos RN/[email or web address]  Options provided:  -- Acute on Chronic Diastolic CHF/HFpEF  -- Chronic Diastolic CHF/HFpEF  -- Other - I will add my own diagnosis  -- Disagree - Not applicable / Not valid  -- Disagree - Clinically unable to determine / Unknown  -- Refer to Clinical Documentation Reviewer    PROVIDER RESPONSE TEXT:    This patient is in acute on chronic diastolic CHF/HFpEF.     Query created by: Olga Jha on 3/29/2023 12:49 PM      Electronically signed by:  Oleksandr Graves MD 3/30/2023 4:21 PM

## 2023-03-30 NOTE — PROGRESS NOTES
Physical Therapy Goals:  Initiated 3/30/2023 to be met within 7-10 days. Short Term Goals  Time Frame for Short Term Goals: 7 days  Short Term Goal 1: Patient will perform supine to/from sit independently  Short Term Goal 2: Patient will perform stand transfers with supervision  Short Term Goal 3: Patient will ambulate 150 feet without AD and supervision  Short Term Goal 4: Patient will negotiate 5 stairs with handrails and supervision to simulate home entry and bedroom negotiation      PHYSICAL THERAPY TREATMENT/DISCHARGE    Patient: Alexa Keyes (89 y.o. female)  Date: 3/30/2023  Diagnosis: Paroxysmal supraventricular tachycardia (HCC) [I47.1]  Hypokalemia [E87.6]  Hypomagnesemia [E83.42]  SVT (supraventricular tachycardia) (HCC) [I47.1]  Positive D dimer [R79.89]  Congestive heart failure, unspecified HF chronicity, unspecified heart failure type ([address] [I50.9] SVT (supraventricular tachycardia) ([address]  Precautions: Fall Risk    ASSESSMENT:  Pt demonstrated good progress with mobility. Able to perform bed mobilty, transfers, ambulation without AD and stair negotiation without assistance. No loss of balance or unsteadiness noted during session. Reviewed HEP to perform to continue LE strengthening. Pt appears at baseline level with regards to functional mobility. No further skilled PT intervention required at this time. PLAN:  Maximum therapeutic gains met at current level of care and patient will be discharged from physical therapy at this time. Rationale for discharge:  [x]     Goals Achieved  []     [address]  []     Patient not participating in therapy  []     Other:    Further Equipment Recommendations for Discharge: No  Discharge Recommendation: Discharge Recommendations: No therapy recommended at discharge    AMPA: 24/24    This AMPA score should be considered in conjunction with interdisciplinary team recommendations to determine the most appropriate discharge setting.  Patient's social Mobility Inpatient Short Form (6-Clicks) Version 2    How much HELP from another person does the patient currently need    (If the patient hasn't done an activity recently, how much help from another person do you think he/she would need if he/she tried?)   Total (Total A or Dep)   A Lot  (Mod to Max A)   A Little (Sup or Min A)   None (Mod I to I)   Turning from your back to your side while in a flat bed without using bedrails? [] 1 [] 2 [] 3 [x] 4   2. Moving from lying on your back to sitting on the side of a flat bed without using bedrails? [] 1 [] 2 [] 3 [x] 4   3. Moving to and from a bed to a chair (including a wheelchair)? [] 1 [] 2 [] 3 [x] 4   4. Standing up from a chair using your arms (e.g., wheelchair, or bedside chair)? [] 1 [] 2 [] 3 [x] 4   5. Walking in hospital room? [] 1 [] 2 [] 3 [x] 4   6. Climbing 3-5 steps with a railing?+   [] 1 [] 2 [] 3 [x] 4   +If stair climbing cannot be assessed, skip item #6. Sum responses from items 1-5.

## 2023-03-30 NOTE — PLAN OF CARE
Problem: Discharge Planning  Goal: Discharge to home or other facility with appropriate resources  Outcome: Progressing  Flowsheets (Taken 3/30/2023 0830)  Discharge to home or other facility with appropriate resources: Identify barriers to discharge with patient and caregiver     Problem: Cardiovascular - Adult  Goal: Maintains optimal cardiac output and hemodynamic stability  Outcome: Progressing  Flowsheets (Taken 3/30/2023 0830)  Maintains optimal cardiac output and hemodynamic stability: Monitor blood pressure and heart rate     Problem: Hematologic - Adult  Goal: Maintains hematologic stability  Outcome: Progressing  Flowsheets (Taken 3/30/2023 0830)  Maintains hematologic stability: Assess for signs and symptoms of bleeding or hemorrhage     Problem: Safety - Adult  Goal: Free from fall injury  Outcome: Progressing

## 2023-03-30 NOTE — PROGRESS NOTES
Hospitalist Progress Note    Patient: Devin Ghosh MRN: [id number]  CSN: [id number]    YOB: 1955  Age: 69 y.o. Sex: female    DOA: 3/28/2023 LOS:  LOS: 2 days          Chief Complaint:    SOB      Assessment/Plan      1. SVT stabilized  Now on cardizem and metoprolol as well as aldactone     2. CHF recurrent with chest  pain , no sx currently  Recent stress test  EF normal  Lasix change to PO     3.  UTI  Urine culture with gram neg rods  IV Rocephin     4. Pelvic mass  Work-up by GYN oncology Dr. Angelina Mcclure  They are following with serial pelvic ultrasound every 6 months  If there is an increase in they will do surgery     5. Anemia  Iron low at 21  Continue venofer  Repeat CBC in am     6. Elevated D-dimer  Duplex negatove    7.hypokalemia-PO Kdur ordered    D/w family and patient    D/c tomorrow if stable        DVT/GI Prophylaxis: Lovenox Protonix    Disposition :  Active Hospital Problems    Diagnosis     Positive D dimer [R79.89]     SVT (supraventricular tachycardia) (HCC) [I47.1]     Mass of pelvis [R19.00]     HTN (hypertension) [I10]     Symptomatic anemia [D64.9]     Hypomagnesemia [E83.42]        Subjective:      Feeling better  Did eat  No bleeding  No CP    Review of systems:    Constitutional: denies fevers, chills  Respiratory: denies SOB, cough  Cardiovascular: denies chest pain, palpitations  Gastrointestinal: denies nausea,      Vital signs/Intake and Output:  Visit Vitals  BP (!) 126/53   Pulse 75   Temp 98.5 °F (36.9 °C) (Oral)   Resp 18   Ht 4' 10\" (1.473 m)   Wt 125 lb 14.1 oz (57.1 kg)   SpO2 96%   BMI 26.31 kg/m²     Current Shift:  No intake/output data recorded.   Last three shifts:  03/28 1901 - 03/30 0700  In: 80 [P.O.:780]  Out: -     Exam:    General: Well developed, alert, NAD, OX3  CVS:Regular rate and rhythm, no M/R/G, S1/S2 heard, no thrill  Lungs:Clear to auscultation bilaterally, no wheezes, rhonchi, or rales  Abdomen: Soft, Nontender, No

## 2023-03-31 VITALS
OXYGEN SATURATION: 100 % | HEART RATE: 77 BPM | WEIGHT: 123.46 LBS | TEMPERATURE: 98.3 F | RESPIRATION RATE: 16 BRPM | BODY MASS INDEX: 25.92 KG/M2 | SYSTOLIC BLOOD PRESSURE: 130 MMHG | DIASTOLIC BLOOD PRESSURE: 53 MMHG | HEIGHT: 58 IN

## 2023-03-31 LAB
ALBUMIN SERPL-MCNC: 2.2 G/DL (ref 3.4–5)
ALBUMIN/GLOB SERPL: 0.5 (ref 0.8–1.7)
ALP SERPL-CCNC: 122 U/L (ref 45–117)
ALT SERPL-CCNC: 18 U/L (ref 13–56)
ANION GAP SERPL CALC-SCNC: 5 MMOL/L (ref 3–18)
AST SERPL-CCNC: 20 U/L (ref 10–38)
BILIRUB SERPL-MCNC: 0.3 MG/DL (ref 0.2–1)
BUN SERPL-MCNC: 19 MG/DL (ref 7–18)
BUN/CREAT SERPL: 17 (ref 12–20)
CALCIUM SERPL-MCNC: 8.2 MG/DL (ref 8.5–10.1)
CHLORIDE SERPL-SCNC: 105 MMOL/L (ref 100–111)
CO2 SERPL-SCNC: 28 MMOL/L (ref 21–32)
CREAT SERPL-MCNC: 1.13 MG/DL (ref 0.6–1.3)
ERYTHROCYTE [DISTWIDTH] IN BLOOD BY AUTOMATED COUNT: 16.1 % (ref 11.6–14.5)
GLOBULIN SER CALC-MCNC: 4.4 G/DL (ref 2–4)
GLUCOSE SERPL-MCNC: 90 MG/DL (ref 74–99)
HCT VFR BLD AUTO: 23.8 % (ref 35–45)
HGB BLD-MCNC: 7.3 G/DL (ref 12–16)
MAGNESIUM SERPL-MCNC: 1.9 MG/DL (ref 1.6–2.6)
MCH RBC QN AUTO: 25.4 PG (ref 24–34)
MCHC RBC AUTO-ENTMCNC: 30.7 G/DL (ref 31–37)
MCV RBC AUTO: 82.9 FL (ref 78–100)
NRBC # BLD: 0 K/UL (ref 0–0.01)
NRBC BLD-RTO: 0 PER 100 WBC
PLATELET # BLD AUTO: 315 K/UL (ref 135–420)
PMV BLD AUTO: 9.7 FL (ref 9.2–11.8)
POTASSIUM SERPL-SCNC: 3.9 MMOL/L (ref 3.5–5.5)
PROT SERPL-MCNC: 6.6 G/DL (ref 6.4–8.2)
RBC # BLD AUTO: 2.87 M/UL (ref 4.2–5.3)
SODIUM SERPL-SCNC: 138 MMOL/L (ref 136–145)
WBC # BLD AUTO: 3.2 K/UL (ref 4.6–13.2)

## 2023-03-31 PROCEDURE — 83735 ASSAY OF MAGNESIUM: CPT

## 2023-03-31 PROCEDURE — 6370000000 HC RX 637 (ALT 250 FOR IP): Performed by: INTERNAL MEDICINE

## 2023-03-31 PROCEDURE — 85027 COMPLETE CBC AUTOMATED: CPT

## 2023-03-31 PROCEDURE — 36415 COLL VENOUS BLD VENIPUNCTURE: CPT

## 2023-03-31 PROCEDURE — 6370000000 HC RX 637 (ALT 250 FOR IP): Performed by: HOSPITALIST

## 2023-03-31 PROCEDURE — 2580000003 HC RX 258: Performed by: INTERNAL MEDICINE

## 2023-03-31 PROCEDURE — 80053 COMPREHEN METABOLIC PANEL: CPT

## 2023-03-31 PROCEDURE — 6360000002 HC RX W HCPCS: Performed by: INTERNAL MEDICINE

## 2023-03-31 RX ORDER — FUROSEMIDE 40 MG/1
40 TABLET ORAL DAILY
Qty: 30 TABLET | Refills: 0 | Status: SHIPPED | OUTPATIENT
Start: 2023-03-31

## 2023-03-31 RX ORDER — METOPROLOL SUCCINATE 25 MG/1
25 TABLET, EXTENDED RELEASE ORAL DAILY
Qty: 30 TABLET | Refills: 3 | Status: SHIPPED | OUTPATIENT
Start: 2023-03-31

## 2023-03-31 RX ORDER — SPIRONOLACTONE 25 MG/1
25 TABLET ORAL DAILY
Qty: 30 TABLET | Refills: 3 | Status: SHIPPED | OUTPATIENT
Start: 2023-03-31

## 2023-03-31 RX ORDER — CIPROFLOXACIN 500 MG/1
500 TABLET, FILM COATED ORAL 2 TIMES DAILY
Qty: 6 TABLET | Refills: 0 | Status: SHIPPED | OUTPATIENT
Start: 2023-03-31 | End: 2023-04-03

## 2023-03-31 RX ORDER — DILTIAZEM HYDROCHLORIDE 180 MG/1
180 CAPSULE, COATED, EXTENDED RELEASE ORAL DAILY
Qty: 30 CAPSULE | Refills: 3 | Status: SHIPPED | OUTPATIENT
Start: 2023-03-31

## 2023-03-31 RX ADMIN — ENOXAPARIN SODIUM 40 MG: 100 INJECTION SUBCUTANEOUS at 08:08

## 2023-03-31 RX ADMIN — CEFTRIAXONE 1000 MG: 1 INJECTION, POWDER, FOR SOLUTION INTRAMUSCULAR; INTRAVENOUS at 05:16

## 2023-03-31 RX ADMIN — SODIUM CHLORIDE, PRESERVATIVE FREE 10 ML: 5 INJECTION INTRAVENOUS at 08:08

## 2023-03-31 RX ADMIN — PANTOPRAZOLE SODIUM 40 MG: 40 TABLET, DELAYED RELEASE ORAL at 08:08

## 2023-03-31 RX ADMIN — FUROSEMIDE 40 MG: 40 TABLET ORAL at 08:08

## 2023-03-31 NOTE — PROGRESS NOTES
D/c plan: Home. Daughter to transport. CM spoke w/ pt's dtr: Richardson Fay. Confirmed w/ her that she will pick her mom up and assist her mom w/ her recovery. No additional CM needs identified. LATONYA reviewed w/ Siobhan Resides.

## 2023-03-31 NOTE — PROGRESS NOTES
conducted a follow up visit with Franko Finch, who is a 69 y.o.,female. Continued the relationship of care and support. Listened empathically. Offered assurance of continued prayer on patient's behalf. Provided Urdu language devotional.  Chart reviewed. Chaplains will continue to follow and will provide pastoral care as needed or requested.  recommends bedside caregivers page the  on duty if patient shows signs of acute spiritual or emotional distress. Rev.  Manny Lopez,Certified [employer] Advance Care   Coordinator [place]  [phone]

## 2023-03-31 NOTE — PLAN OF CARE
Problem: Discharge Planning  Goal: Discharge to home or other facility with appropriate resources  3/31/2023 0037 by Cheko Warren RN  Outcome: Progressing  3/30/2023 1045 by Diego Ny RN  Outcome: Progressing  Flowsheets (Taken 3/30/2023 0830)  Discharge to home or other facility with appropriate resources: Identify barriers to discharge with patient and caregiver     Problem: Cardiovascular - Adult  Goal: Maintains optimal cardiac output and hemodynamic stability  3/31/2023 0037 by Cheko Warren RN  Outcome: Progressing  3/30/2023 1045 by Diego Ny RN  Outcome: Progressing  Flowsheets (Taken 3/30/2023 0830)  Maintains optimal cardiac output and hemodynamic stability: Monitor blood pressure and heart rate     Problem: Hematologic - Adult  Goal: Maintains hematologic stability  3/31/2023 0037 by Cheko Warren RN  Outcome: Progressing  3/30/2023 1045 by Diego Ny RN  Outcome: Progressing  Flowsheets (Taken 3/30/2023 0830)  Maintains hematologic stability: Assess for signs and symptoms of bleeding or hemorrhage     Problem: Safety - Adult  Goal: Free from fall injury  3/31/2023 0037 by Cheko Warren RN  Outcome: Progressing  3/30/2023 1045 by Diego Ny RN  Outcome: Progressing

## 2023-03-31 NOTE — PLAN OF CARE
Problem: Discharge Planning  Goal: Discharge to home or other facility with appropriate resources  3/31/2023 0935 by Sanjeev Durant RN  Outcome: Progressing  3/31/2023 0037 by Ritu Farmer RN  Outcome: Progressing     Problem: Cardiovascular - Adult  Goal: Maintains optimal cardiac output and hemodynamic stability  3/31/2023 0935 by Sanjeev Durant RN  Outcome: Progressing  3/31/2023 0037 by Ritu Farmer RN  Outcome: Progressing     Problem: Hematologic - Adult  Goal: Maintains hematologic stability  3/31/2023 0935 by Sanjeev Durant RN  Outcome: Progressing  3/31/2023 0037 by Ritu Farmer RN  Outcome: Progressing     Problem: Safety - Adult  Goal: Free from fall injury  3/31/2023 0935 by Sanjeev Durant RN  Outcome: Progressing  3/31/2023 0037 by Ritu Farmer RN  Outcome: Progressing

## 2023-03-31 NOTE — DISCHARGE SUMMARY
[facility] SUMMARY    Name:  Gonzalo Fung  MR#:   [id number]  :  1955  ACCOUNT #:  [de-identified]  ADMIT DATE:  2023  DISCHARGE DATE:  2023      DISCHARGE DIAGNOSES:  1. Recurrent supraventricular tachycardia. 2.  Diastolic congestive heart failure. 3.  Urinary tract infection with Escherichia coli. 4.  Pelvic mass followed by GYN Oncology. 5.  Iron-deficiency anemia. 6.  Elevated D-dimer. 7.  Hypokalemia. HOSPITAL SUMMARY:  This 69-year-old female is known to the medical service as she has been here recently with SVT, abnormal troponin, pelvic mass, and was last here at beginning of February. She had medication adjustments for her arrhythmia, and was recommended to follow up with Nephrology, Cardiology and GYN Oncology for a mass in her pelvis that she has, which she has done. She does have chronic anemia also. The patient came back into the hospital on the aforementioned date. She had been off some of the medications due to renal insufficiency and started experiencing dyspnea with minimal exertion and occasional chest pain. She was hypokalemia, hypomagnesemic, and anemic in the emergency room. The patient was admitted to telemetry. She has had iron infusions for her anemia. Her most recent H and H are 7.3 and 23.8, MCV is 82.9. The patient's electrolytes are stable outside of mild hypokalemia on admission that has been corrected. Magnesium was low also that has been corrected. Albumin is low at 2.2. The patient had an abnormal urinalysis, so culture was sent. It came back with pansensitive E. coli, so she has received intravenous antibiotics and will transition to oral antibiotics for discharge. She had a CT angiogram of chest that showed no acute pulmonary embolism. Echocardiogram from  shows EF of 55%-60%. Medications were adjusted. Cardiology followed her.   She received iron infusion, and her iron percent saturation is low at

## 2024-11-21 ENCOUNTER — HOSPITAL ENCOUNTER (OUTPATIENT)
Facility: HOSPITAL | Age: 69
Discharge: HOME | End: 2024-11-21
Payer: MEDICARE

## 2024-11-21 ENCOUNTER — TRANSCRIBE ORDERS (OUTPATIENT)
Facility: HOSPITAL | Age: 69
End: 2024-11-21

## 2024-11-21 DIAGNOSIS — N18.31 CHRONIC KIDNEY DISEASE (CKD) STAGE G3A/A1, MODERATELY DECREASED GLOMERULAR FILTRATION RATE (GFR) BETWEEN 45-59 ML/MIN/1.73 SQUARE METER AND ALBUMINURIA CREATININE RATIO LESS THAN 30 MG/G (HCC): Primary | ICD-10-CM

## 2024-11-21 DIAGNOSIS — N18.31 CHRONIC KIDNEY DISEASE (CKD) STAGE G3A/A1, MODERATELY DECREASED GLOMERULAR FILTRATION RATE (GFR) BETWEEN 45-59 ML/MIN/1.73 SQUARE METER AND ALBUMINURIA CREATININE RATIO LESS THAN 30 MG/G (HCC): ICD-10-CM

## 2024-11-21 LAB
ANION GAP SERPL CALC-SCNC: 10 MMOL/L (ref 3–18)
BUN SERPL-MCNC: 43 MG/DL (ref 7–18)
BUN/CREAT SERPL: 21 (ref 12–20)
CALCIUM SERPL-MCNC: 10.2 MG/DL (ref 8.5–10.1)
CHLORIDE SERPL-SCNC: 99 MMOL/L (ref 100–111)
CO2 SERPL-SCNC: 25 MMOL/L (ref 21–32)
CREAT SERPL-MCNC: 2.03 MG/DL (ref 0.6–1.3)
FAX TO NUMBER: NORMAL
GLUCOSE SERPL-MCNC: 98 MG/DL (ref 74–99)
POTASSIUM SERPL-SCNC: 4.9 MMOL/L (ref 3.5–5.5)
SODIUM SERPL-SCNC: 134 MMOL/L (ref 136–145)
TEST RESULTS FAXED TO: NORMAL

## 2024-11-21 PROCEDURE — 80048 BASIC METABOLIC PNL TOTAL CA: CPT

## 2024-11-21 PROCEDURE — 36415 COLL VENOUS BLD VENIPUNCTURE: CPT

## (undated) DEVICE — GLOVE ORANGE PI 7 1/2   MSG9075

## (undated) DEVICE — SYRINGE 50ML E/T

## (undated) DEVICE — APPLIER CLP M L L11.4IN DIA10MM ENDOSCP ROT MULT FOR LIG

## (undated) DEVICE — CATH SUC CTRL PRT TRIFLO 14FR --

## (undated) DEVICE — SPONGE GZ W4XL4IN RAYON POLY 4 PLY NONWOVEN FASTER WICKING

## (undated) DEVICE — SINGLE USE RETRIEVAL BASKET V: Brand: SINGLE USE RETRIEVAL BASKET V

## (undated) DEVICE — SOLIDIFIER FLUID 3000 CC ABSORB

## (undated) DEVICE — SET ADMIN 16ML TBNG L100IN 2 Y INJ SITE IV PIGGY BK DISP

## (undated) DEVICE — MAJ-1414 SINGLE USE ADPATER BIOPSY VALV: Brand: SINGLE USE ADAPTOR BIOPSY VALVE

## (undated) DEVICE — WRISTBAND ID AD W2.5XL9.5CM RED VYN ADH CLSR UNI-PRINT

## (undated) DEVICE — TISSUE RETRIEVAL SYSTEM: Brand: INZII RETRIEVAL SYSTEM

## (undated) DEVICE — ENDOCUT SCISSOR TIP, DISPOSABLE: Brand: RENEW

## (undated) DEVICE — INSUFFLATION NEEDLE TO ESTABLISH PNEUMOPERITONEUM.: Brand: INSUFFLATION NEEDLE

## (undated) DEVICE — SYR LR LCK 1ML GRAD NSAF 30ML --

## (undated) DEVICE — DEVON™ KNEE AND BODY STRAP 60" X 3" (1.5 M X 7.6 CM): Brand: DEVON

## (undated) DEVICE — PREP SKN CHLRAPRP SNGL 1.75ML --

## (undated) DEVICE — SUTURE SZ 0 27IN 5/8 CIR UR-6  TAPER PT VIOLET ABSRB VICRYL J603H

## (undated) DEVICE — NDL PRT INJ NSAF BLNT 18GX1.5 --

## (undated) DEVICE — SOLUTION LACTATED RINGERS INJECTION USP

## (undated) DEVICE — TROCAR: Brand: KII® OPTICAL ACCESS SYSTEM

## (undated) DEVICE — THIS PRODUCT IS SINGLE USE AND INTENDED TO BE USED FOR BLUNT DISSECTION OF TISSUE.: Brand: ASPEN® ENDOSCOPIC KITTNER, SINGLE TIP

## (undated) DEVICE — CANNULA CUSH AD W/ 14FT TBG

## (undated) DEVICE — KENDALL RADIOLUCENT FOAM MONITORING ELECTRODE RECTANGULAR SHAPE: Brand: KENDALL

## (undated) DEVICE — BITEBLOCK ENDOSCP 60FR MAXI WHT POLYETH STURDY W/ VELC WVN

## (undated) DEVICE — KENDALL SCD EXPRESS SLEEVES, KNEE LENGTH, MEDIUM: Brand: KENDALL SCD

## (undated) DEVICE — SYR 5ML 1/5 GRAD LL NSAF LF --

## (undated) DEVICE — SET EXTN TBNG L BOR 4 W STPCOCK ST 32IN PRIMING VOL 6ML

## (undated) DEVICE — SOLUTION IV 500ML 0.9% SOD CHL FLX CONT

## (undated) DEVICE — SUTURE MCRYL SZ 4-0 L27IN ABSRB UD L24MM PS-1 3/8 CIR PRIM Y935H

## (undated) DEVICE — E-Z CLEAN, PTFE COATED, ELECTROSURGICAL LAPAROSCOPIC ELECTRODE, L-HOOK, 33 CM., SINGLE-USE, FOR USE WITH HAND CONTROL PENCIL: Brand: MEGADYNE

## (undated) DEVICE — SINGLE USE 3-LUMEN SPHINCTEROTOME V: Brand: SINGLE USE 3-LUMEN SPHINCTEROTOME V

## (undated) DEVICE — ENDO CARRY-ON PROCEDURE KIT INCLUDES ENZYMATIC SPONGE, GAUZE, BIOHAZARD LABEL, TRAY, LUBRICANT, DIRTY SCOPE LABEL, WATER LABEL, TRAY, DRAWSTRING PAD, AND DEFENDO 4-PIECE KIT.: Brand: ENDO CARRY-ON PROCEDURE KIT

## (undated) DEVICE — 1200 GUARD II KIT W/5MM TUBE W/O VAC TUBE: Brand: GUARDIAN

## (undated) DEVICE — GUIDEWIRE ENDOSCP DIA0.025IN L270CM HYDRPHLC STR TIP RG

## (undated) DEVICE — SUTURE VCRL SZ 3-0 L27IN ABSRB UD L26MM SH 1/2 CIR J416H

## (undated) DEVICE — TUBING INSUF L10FT HI FLO 400 INSUFFLATOR SYS DISP FOR LAP

## (undated) DEVICE — LAPAROSCOPIC TROCAR SLEEVE/SINGLE USE: Brand: KII® OPTICAL ACCESS SYSTEM

## (undated) DEVICE — MEDI-VAC NON-CONDUCTIVE SUCTION TUBING: Brand: CARDINAL HEALTH

## (undated) DEVICE — LAP CHOLE: Brand: MEDLINE INDUSTRIES, INC.

## (undated) DEVICE — TRNQT TEXT 1X18IN BLU LF DISP -- CONVERT TO ITEM 362165

## (undated) DEVICE — DERMABOND SKIN ADH 0.7ML --

## (undated) DEVICE — SOLUTION IV 1000ML 0.9% SOD CHL

## (undated) DEVICE — CATH IV SAFE STR 22GX1IN BLU -- PROTECTIV PLUS

## (undated) DEVICE — DISPOSABLE SUCTION/IRRIGATOR TUBE SET WITH TIP: Brand: AHTO

## (undated) DEVICE — GLOVE SURG SZ 8 L12IN THK75MIL DK GRN LTX FREE

## (undated) DEVICE — SUT MONOCRYL PLUS UD 4-0 --

## (undated) DEVICE — SYR 10ML LUER LOK 1/5ML GRAD --

## (undated) DEVICE — NDL FLTR TIP 5 MIC 18GX1.5IN --

## (undated) DEVICE — SYR 3ML LL TIP 1/10ML GRAD --

## (undated) DEVICE — TROCAR RMFG Z 3RD SLEEVE 5X100 -- LAWSON OEM ITEM 262365 PK/6